# Patient Record
Sex: MALE | Race: WHITE | NOT HISPANIC OR LATINO | Employment: FULL TIME | URBAN - METROPOLITAN AREA
[De-identification: names, ages, dates, MRNs, and addresses within clinical notes are randomized per-mention and may not be internally consistent; named-entity substitution may affect disease eponyms.]

---

## 2017-06-27 ENCOUNTER — TELEPHONE (OUTPATIENT)
Dept: INTERNAL MEDICINE | Facility: CLINIC | Age: 62
End: 2017-06-27

## 2017-09-18 ENCOUNTER — OFFICE VISIT (OUTPATIENT)
Dept: INTERNAL MEDICINE | Facility: CLINIC | Age: 62
End: 2017-09-18

## 2017-09-18 ENCOUNTER — HOSPITAL ENCOUNTER (OUTPATIENT)
Dept: GENERAL RADIOLOGY | Facility: HOSPITAL | Age: 62
Discharge: HOME | End: 2017-09-18
Admitting: INTERNAL MEDICINE

## 2017-09-18 VITALS
TEMPERATURE: 98.4 F | DIASTOLIC BLOOD PRESSURE: 82 MMHG | OXYGEN SATURATION: 98 % | SYSTOLIC BLOOD PRESSURE: 120 MMHG | HEIGHT: 73 IN | WEIGHT: 204.13 LBS | BODY MASS INDEX: 27.05 KG/M2 | HEART RATE: 83 BPM

## 2017-09-18 DIAGNOSIS — R05.9 COUGH: Primary | ICD-10-CM

## 2017-09-18 PROCEDURE — 71020 HC CHEST PA AND LATERAL: CPT

## 2017-09-18 PROCEDURE — 99213 OFFICE O/P EST LOW 20 MIN: CPT | Performed by: INTERNAL MEDICINE

## 2017-09-18 NOTE — PROGRESS NOTES
Subjective   Federico Angeles is a 61 y.o. male.     Pt complains of having on & off productive cough for a couple of months.   Pt has tried multiple meds; z-jessica, tessalon caps & rx cough syrup.     History of Present Illness   Pt started the end of June in the [facility] for a cough and was treated with an abx and went away.  It has now been back for 1 month.  Mostly dry.  No help with allegra or mucinex..  COugh is worse as the day goes on.  Always a tickle in the back of the throat. Does cough some when lying tony but it does not keep him up.  No gerd no orthopnea no PND no LE edema    The following portions of the patient's history were reviewed and updated as appropriate: allergies, current medications, past medical history, past social history and problem list.  No new environment.  Wife is now ill with cough but that is new    Review of Systems   All other systems reviewed and are negative.      Objective     Vitals:    09/18/17 0726   BP: 120/82   Pulse: 83   Temp: 98.4 °F (36.9 °C)   SpO2: 98%       Physical Exam   Constitutional: He is oriented to person, place, and time. He appears well-developed and well-nourished.   HENT:   Head: Normocephalic and atraumatic.   Right Ear: External ear normal.   Left Ear: External ear normal.   Mouth/Throat: Oropharynx is clear and moist.   Eyes: Conjunctivae and EOM are normal. Pupils are equal, round, and reactive to light.   Neck: Normal range of motion. No tracheal deviation present. No thyromegaly present.   Cardiovascular: Normal rate, regular rhythm, normal heart sounds and intact distal pulses.    Pulmonary/Chest: Effort normal and breath sounds normal.   Musculoskeletal: Normal range of motion. He exhibits no edema or deformity.   Neurological: He is alert and oriented to person, place, and time.   Skin: Skin is warm and dry.   Psychiatric: He has a normal mood and affect. His behavior is normal. Judgment and thought content normal.   Vitals reviewed.      Assessment/Plan    Diagnoses and all orders for this visit:    Cough  -     XR Chest PA & Lateral    Other orders  -     mometasone-formoterol (DULERA) 100-5 MCG/ACT inhaler; Inhale 2 puffs 2 (Two) Times a Day.        1. Cough- He needs an xray.  He will try dulera 100/5 and see if that helps  If cough cont we will refer pulm or allergy

## 2017-09-22 ENCOUNTER — TELEPHONE (OUTPATIENT)
Dept: INTERNAL MEDICINE | Facility: CLINIC | Age: 62
End: 2017-09-22

## 2017-09-22 NOTE — TELEPHONE ENCOUNTER
PT AWARE. HE IS GOING TO CALL Monday IF HE IS NOT BETTER    ----- Message from MARLENY Henley sent at 9/22/2017  2:58 PM EDT -----  Let's try flonase for his ear popping. Dr Berry had recommended a referral to allergist or pulm if cough continued. I would refer to allergist.     ----- Message -----     From: Sammie Yanes MA     Sent: 9/22/2017   2:43 PM       To: MARLENY Henley    PT CALLED HE WAS SEEN ON Monday FOR COUGH. DR BERRY GAVE HIM A DULERA INHALER BUT HE FEELS THIS IS NOT HELPING. ALSO HIS LEFT EAR POPS AND MAKES HIM DIZZY. DO YOU HAVE ANY RECOMMENDATIONS FOR HIM?

## 2017-11-21 DIAGNOSIS — Z00.00 HEALTHCARE MAINTENANCE: ICD-10-CM

## 2017-11-28 ENCOUNTER — HOSPITAL ENCOUNTER (EMERGENCY)
Facility: HOSPITAL | Age: 62
Discharge: HOME | End: 2017-11-28
Attending: EMERGENCY MEDICINE | Admitting: EMERGENCY MEDICINE

## 2017-11-28 ENCOUNTER — OFFICE VISIT (OUTPATIENT)
Dept: INTERNAL MEDICINE | Facility: CLINIC | Age: 62
End: 2017-11-28

## 2017-11-28 VITALS
HEART RATE: 65 BPM | DIASTOLIC BLOOD PRESSURE: 93 MMHG | RESPIRATION RATE: 16 BRPM | BODY MASS INDEX: 27.17 KG/M2 | HEIGHT: 73 IN | OXYGEN SATURATION: 99 % | TEMPERATURE: 97.5 F | SYSTOLIC BLOOD PRESSURE: 142 MMHG | WEIGHT: 205 LBS

## 2017-11-28 VITALS
BODY MASS INDEX: 27.83 KG/M2 | DIASTOLIC BLOOD PRESSURE: 90 MMHG | SYSTOLIC BLOOD PRESSURE: 130 MMHG | HEART RATE: 60 BPM | HEIGHT: 73 IN | TEMPERATURE: 97.7 F | WEIGHT: 210 LBS | RESPIRATION RATE: 16 BRPM

## 2017-11-28 DIAGNOSIS — M54.41 CHRONIC BILATERAL LOW BACK PAIN WITH RIGHT-SIDED SCIATICA: Primary | ICD-10-CM

## 2017-11-28 DIAGNOSIS — G89.29 CHRONIC BILATERAL LOW BACK PAIN WITH RIGHT-SIDED SCIATICA: Primary | ICD-10-CM

## 2017-11-28 DIAGNOSIS — M54.50 ACUTE EXACERBATION OF CHRONIC LOW BACK PAIN: Primary | ICD-10-CM

## 2017-11-28 DIAGNOSIS — G89.29 ACUTE EXACERBATION OF CHRONIC LOW BACK PAIN: Primary | ICD-10-CM

## 2017-11-28 LAB
ALBUMIN SERPL-MCNC: 4.3 G/DL (ref 3.5–5.2)
ALBUMIN/GLOB SERPL: 1.5 G/DL
ALP SERPL-CCNC: 76 U/L (ref 39–117)
ALT SERPL-CCNC: 20 U/L (ref 1–41)
AST SERPL-CCNC: 22 U/L (ref 1–40)
BILIRUB SERPL-MCNC: 0.4 MG/DL (ref 0.1–1.2)
BUN SERPL-MCNC: 16 MG/DL (ref 8–23)
BUN/CREAT SERPL: 22.5 (ref 7–25)
CALCIUM SERPL-MCNC: 9.2 MG/DL (ref 8.6–10.5)
CHLORIDE SERPL-SCNC: 102 MMOL/L (ref 98–107)
CHOLEST SERPL-MCNC: 175 MG/DL (ref 0–200)
CO2 SERPL-SCNC: 27.1 MMOL/L (ref 22–29)
CREAT SERPL-MCNC: 0.71 MG/DL (ref 0.76–1.27)
GFR SERPLBLD CREATININE-BSD FMLA CKD-EPI: 112 ML/MIN/1.73
GFR SERPLBLD CREATININE-BSD FMLA CKD-EPI: 136 ML/MIN/1.73
GLOBULIN SER CALC-MCNC: 2.8 GM/DL
GLUCOSE SERPL-MCNC: 95 MG/DL (ref 65–99)
HDLC SERPL-MCNC: 57 MG/DL (ref 40–60)
LDLC SERPL CALC-MCNC: 103 MG/DL (ref 0–100)
LDLC/HDLC SERPL: 1.8 {RATIO}
POTASSIUM SERPL-SCNC: 4.9 MMOL/L (ref 3.5–5.2)
PROT SERPL-MCNC: 7.1 G/DL (ref 6–8.5)
PSA SERPL-MCNC: 1.5 NG/ML (ref 0–4)
SODIUM SERPL-SCNC: 141 MMOL/L (ref 136–145)
TRIGL SERPL-MCNC: 77 MG/DL (ref 0–150)
TSH SERPL DL<=0.005 MIU/L-ACNC: 1.57 MIU/ML (ref 0.27–4.2)
VLDLC SERPL CALC-MCNC: 15.4 MG/DL (ref 5–40)

## 2017-11-28 PROCEDURE — 99283 EMERGENCY DEPT VISIT LOW MDM: CPT

## 2017-11-28 PROCEDURE — 96372 THER/PROPH/DIAG INJ SC/IM: CPT | Performed by: NURSE PRACTITIONER

## 2017-11-28 PROCEDURE — 99213 OFFICE O/P EST LOW 20 MIN: CPT | Performed by: NURSE PRACTITIONER

## 2017-11-28 RX ORDER — UBIDECARENONE 100 MG
100 CAPSULE ORAL DAILY
COMMUNITY
End: 2018-12-10

## 2017-11-28 RX ORDER — OXYCODONE HYDROCHLORIDE AND ACETAMINOPHEN 5; 325 MG/1; MG/1
2 TABLET ORAL ONCE
Status: COMPLETED | OUTPATIENT
Start: 2017-11-28 | End: 2017-11-28

## 2017-11-28 RX ORDER — ORPHENADRINE CITRATE 100 MG/1
100 TABLET, EXTENDED RELEASE ORAL 2 TIMES DAILY
Qty: 20 TABLET | Refills: 0 | Status: SHIPPED | OUTPATIENT
Start: 2017-11-28 | End: 2018-12-10

## 2017-11-28 RX ORDER — IBUPROFEN 200 MG
200 TABLET ORAL EVERY 6 HOURS PRN
COMMUNITY
End: 2019-12-16

## 2017-11-28 RX ORDER — OXYCODONE HYDROCHLORIDE AND ACETAMINOPHEN 5; 325 MG/1; MG/1
1-2 TABLET ORAL EVERY 4 HOURS PRN
Qty: 15 TABLET | Refills: 0 | Status: SHIPPED | OUTPATIENT
Start: 2017-11-28 | End: 2017-12-04 | Stop reason: SDUPTHER

## 2017-11-28 RX ORDER — HYDROCODONE BITARTRATE AND ACETAMINOPHEN 7.5; 325 MG/1; MG/1
1 TABLET ORAL ONCE
Status: DISCONTINUED | OUTPATIENT
Start: 2017-11-28 | End: 2017-11-28

## 2017-11-28 RX ORDER — CETIRIZINE HYDROCHLORIDE 10 MG/1
10 TABLET ORAL DAILY
COMMUNITY

## 2017-11-28 RX ORDER — METHYLPREDNISOLONE ACETATE 80 MG/ML
80 INJECTION, SUSPENSION INTRA-ARTICULAR; INTRALESIONAL; INTRAMUSCULAR; SOFT TISSUE ONCE
Status: COMPLETED | OUTPATIENT
Start: 2017-11-28 | End: 2017-11-28

## 2017-11-28 RX ORDER — DIAZEPAM 5 MG/1
5 TABLET ORAL EVERY 6 HOURS PRN
Status: DISCONTINUED | OUTPATIENT
Start: 2017-11-28 | End: 2017-11-28

## 2017-11-28 RX ORDER — METHYLPREDNISOLONE 4 MG/1
TABLET ORAL
Qty: 21 TABLET | Refills: 0 | Status: SHIPPED | OUTPATIENT
Start: 2017-11-28 | End: 2017-12-04

## 2017-11-28 RX ADMIN — METHYLPREDNISOLONE ACETATE 80 MG: 80 INJECTION, SUSPENSION INTRA-ARTICULAR; INTRALESIONAL; INTRAMUSCULAR; SOFT TISSUE at 10:29

## 2017-11-28 RX ADMIN — OXYCODONE HYDROCHLORIDE AND ACETAMINOPHEN 2 TABLET: 5; 325 TABLET ORAL at 09:00

## 2017-11-28 NOTE — PROGRESS NOTES
Subjective   Federico Angeles is a 61 y.o. male. Patient is here today for   Chief Complaint   Patient presents with   • Back Pain   .    History of Present Illness   C/o back pain x 10 days associated with occasional pain in his right thigh. The pain is now mostly on the left side. He had been cleaning a few days prior to the symptoms starting. He saw a chiropractor on Saturday and was feeling better, and then saw the chiropractor again yesterday and has had increased pain since then.  He has used ice and heat with minimal relief.    He has had problems with his back in the past and had an MRI done in August 2016.  At that time he was going to be going to a spine Center in [place], but his back started feeling better.  Dr. Mendoza at [facility] ordered the MRI.  He has not seen an orthopedic doctor.  He was seen this morning at the emergency room and was given 2 Percocets and then a prescription for Percocet and Norflex.  Due to his increased pain, he is requesting an MRI.  He has also been doing stretches that were recommended by the chiropractor.    The following portions of the patient's history were reviewed and updated as appropriate: allergies, current medications, past family history, past medical history, past social history, past surgical history and problem list.    Review of Systems   Constitutional: Negative.    Respiratory: Negative.    Cardiovascular: Negative.    Musculoskeletal: Positive for back pain.       Objective   Vitals:    11/28/17 0948   BP: 130/90   Pulse: 60   Resp: 16   Temp: 97.7 °F (36.5 °C)     Physical Exam   Constitutional: He is oriented to person, place, and time. Vital signs are normal. He appears well-developed and well-nourished.   Cardiovascular: Normal rate, regular rhythm and normal heart sounds.    Pulmonary/Chest: Effort normal and breath sounds normal.   Musculoskeletal:        Lumbar back: He exhibits decreased range of motion, pain and spasm. He exhibits no bony tenderness  and no swelling.   Neurological: He is alert and oriented to person, place, and time. He has normal strength.   Skin: Skin is warm, dry and intact.   Psychiatric: He has a normal mood and affect. His speech is normal and behavior is normal. Thought content normal.   Nursing note and vitals reviewed.      Assessment/Plan   Federico was seen today for back pain.    Diagnoses and all orders for this visit:    Chronic bilateral low back pain with right-sided sciatica  -     MRI Lumbar Spine Without Contrast  -     MethylPREDNISolone (MEDROL) 4 MG tablet; follow package directions  -     methylPREDNISolone acetate (DEPO-medrol) injection 80 mg; Inject 1 mL into the shoulder, thigh, or buttocks 1 (One) Time.      Will order an MRI since he has had one in the past, but explained to the patient that he may need to try have an x-ray or physical therapy first. States that his chiropractor did an x-ray.

## 2017-11-30 ENCOUNTER — PATIENT MESSAGE (OUTPATIENT)
Dept: INTERNAL MEDICINE | Facility: CLINIC | Age: 62
End: 2017-11-30

## 2017-11-30 DIAGNOSIS — M51.36 BULGING LUMBAR DISC: Primary | ICD-10-CM

## 2017-11-30 DIAGNOSIS — M51.26 LUMBAR HERNIATED DISC: ICD-10-CM

## 2017-12-04 ENCOUNTER — OFFICE VISIT (OUTPATIENT)
Dept: INTERNAL MEDICINE | Facility: CLINIC | Age: 62
End: 2017-12-04

## 2017-12-04 VITALS
OXYGEN SATURATION: 97 % | HEART RATE: 102 BPM | SYSTOLIC BLOOD PRESSURE: 130 MMHG | DIASTOLIC BLOOD PRESSURE: 84 MMHG | HEIGHT: 73 IN | BODY MASS INDEX: 26.9 KG/M2 | WEIGHT: 203 LBS

## 2017-12-04 DIAGNOSIS — J30.9 ALLERGIC RHINITIS, UNSPECIFIED CHRONICITY, UNSPECIFIED SEASONALITY, UNSPECIFIED TRIGGER: ICD-10-CM

## 2017-12-04 DIAGNOSIS — R25.2 CRAMP OF BOTH LOWER EXTREMITIES: ICD-10-CM

## 2017-12-04 DIAGNOSIS — M54.5 LOW BACK PAIN, UNSPECIFIED BACK PAIN LATERALITY, UNSPECIFIED CHRONICITY, WITH SCIATICA PRESENCE UNSPECIFIED: ICD-10-CM

## 2017-12-04 DIAGNOSIS — Z00.00 HEALTH CARE MAINTENANCE: Primary | ICD-10-CM

## 2017-12-04 PROCEDURE — 99213 OFFICE O/P EST LOW 20 MIN: CPT | Performed by: INTERNAL MEDICINE

## 2017-12-04 RX ORDER — OXYCODONE HYDROCHLORIDE AND ACETAMINOPHEN 5; 325 MG/1; MG/1
1-2 TABLET ORAL EVERY 4 HOURS PRN
Qty: 15 TABLET | Refills: 0 | Status: SHIPPED | OUTPATIENT
Start: 2017-12-04 | End: 2018-12-10

## 2017-12-04 NOTE — PROGRESS NOTES
"Subjective   Federico Angeles is a 61 y.o. male and is here for a comprehensive physical exam. The patient reports problems - LBP.    Pt had LBP on the left  He was seen by the NP. He did respond to steroids.and he is doing better  HE did get an MRI which showed some acute on chronic herniated discs.  He does see a chiro on occas.  He does do a lot of stretching on his own and that does help  He does get leg cramps at night on occas    Do you take any herbs or supplements that were not prescribed by a doctor? He does take glucosamine and a MVI and does well with this      Social History: He does stay very active but reg schedule exercise  He does not eat much junk food    Social History     Social History   • Marital status:      Spouse name: PAT ANGELES   • Number of children: 2   • Years of education: N/A     Occupational History   • air  [employer]     Social History Main Topics   • Smoking status: Never Smoker   • Smokeless tobacco: Never Used   • Alcohol use No   • Drug use: No   • Sexual activity: Defer     Other Topics Concern   • Not on file     Social History Narrative    2 grown children    2 grandchildren       Family History:   Family History   Problem Relation Age of Onset   • Heart attack Father 55     drinker   • Liver disease Father    • No Known Problems Sister    • No Known Problems Brother        Past Medical History:   Past Medical History:   Diagnosis Date   • Generalized headaches    • Leg cramps            Review of Systems    A comprehensive review of systems was negative.    Objective   /84 (BP Location: Left arm, Patient Position: Sitting, Cuff Size: Large Adult)  Pulse 102  Ht 73\" (185.4 cm)  Wt 203 lb (92.1 kg)  SpO2 97%  BMI 26.78 kg/m2    General Appearance:    Alert, cooperative, no distress, appears stated age   Head:    Normocephalic, without obvious abnormality, atraumatic   Eyes:    PERRL, conjunctiva/corneas clear, EOM's intact, fundi     " benign, both eyes        Ears:    Normal TM's and external ear canals, both ears   Nose:   Nares normal, septum midline, mucosa normal, no drainage    or sinus tenderness   Throat:   Lips, mucosa, and tongue normal; teeth and gums normal   Neck:   Supple, symmetrical, trachea midline, no adenopathy;        thyroid:  No enlargement/tenderness/nodules; no carotid    bruit or JVD   Back:     Symmetric, no curvature, ROM normal, no CVA tenderness   Lungs:     Clear to auscultation bilaterally, respirations unlabored   Chest wall:    No tenderness or deformity   Heart:    Regular rate and rhythm, S1 and S2 normal, no murmur, rub   or gallop   Abdomen:     Soft, non-tender, bowel sounds active all four quadrants,     no masses, no organomegaly           Extremities:   Extremities normal, atraumatic, no cyanosis or edema   Pulses:   2+ and symmetric all extremities   Skin:   Skin color, texture, turgor normal, no rashes or lesions   Lymph nodes:   Cervical, supraclavicular, and axillary nodes normal   Neurologic:   CNII-XII intact. Normal strength, sensation and reflexes       throughout         Medications:   Current Outpatient Prescriptions:   •  cetirizine (zyrTEC) 10 MG tablet, Take 10 mg by mouth Daily., Disp: , Rfl:   •  coenzyme Q10 100 MG capsule, Take 100 mg by mouth Daily., Disp: , Rfl:   •  Glucosamine-Chondroitin--200-150 MG tablet, Take  by mouth., Disp: , Rfl:   •  ibuprofen (ADVIL,MOTRIN) 200 MG tablet, Take 200 mg by mouth Every 6 (Six) Hours As Needed for Mild Pain  (4 tablets at a time states pt)., Disp: , Rfl:   •  orphenadrine (NORFLEX) 100 MG 12 hr tablet, Take 1 tablet by mouth 2 (Two) Times a Day., Disp: 20 tablet, Rfl: 0  •  oxyCODONE-acetaminophen (PERCOCET) 5-325 MG per tablet, Take 1-2 tablets by mouth Every 4 (Four) Hours As Needed (pain)., Disp: 15 tablet, Rfl: 0       Assessment/Plan   Healthy male exam.      1. Healthcare Maintenance:  2. Patient Counseling:  --Nutrition: Stressed  importance of moderation in sodium/caffeine intake, saturated fat and cholesterol, caloric balance, sufficient intake of fresh fruits, vegetables, fiber, calcium and vit D  --Exercise: I have rec 30 minutes a day  --Substance Abuse: no tob no etoh  --Dental health: he does go to the dentist reg  --Immunizations reviewed.  --Discussed benefits of screening colonoscopy.he has had a colon but we are requesting the report  3. LBP-  He is doing well now  He would like a refill to have on hand in case this happens again  4. AR-  He does have some sx but usually ends after the fall  He will try claritin or allegra

## 2018-02-18 ENCOUNTER — PATIENT MESSAGE (OUTPATIENT)
Dept: INTERNAL MEDICINE | Facility: CLINIC | Age: 63
End: 2018-02-18

## 2018-02-19 RX ORDER — OSELTAMIVIR PHOSPHATE 75 MG/1
75 CAPSULE ORAL DAILY
Qty: 10 CAPSULE | Refills: 0 | Status: SHIPPED | OUTPATIENT
Start: 2018-02-19 | End: 2018-02-19

## 2018-02-19 NOTE — TELEPHONE ENCOUNTER
From: Federico Angeles  To: Ying Villatoro MD  Sent: 2/18/2018 10:23 AM EST  Subject: Prescription Question    Mohini was diagnosed with the flu type A. Can I get a script for Tamaflu. Dae Angeles

## 2018-11-30 DIAGNOSIS — Z00.00 HEALTH CARE MAINTENANCE: ICD-10-CM

## 2018-11-30 DIAGNOSIS — M54.5 LOW BACK PAIN, UNSPECIFIED BACK PAIN LATERALITY, UNSPECIFIED CHRONICITY, WITH SCIATICA PRESENCE UNSPECIFIED: ICD-10-CM

## 2018-12-04 LAB
ALBUMIN SERPL-MCNC: 4.2 G/DL (ref 3.5–5.2)
ALBUMIN/GLOB SERPL: 1.7 G/DL
ALP SERPL-CCNC: 77 U/L (ref 39–117)
ALT SERPL-CCNC: 15 U/L (ref 1–41)
AST SERPL-CCNC: 19 U/L (ref 1–40)
BILIRUB SERPL-MCNC: 0.3 MG/DL (ref 0.1–1.2)
BUN SERPL-MCNC: 14 MG/DL (ref 8–23)
BUN/CREAT SERPL: 17.1 (ref 7–25)
CALCIUM SERPL-MCNC: 9 MG/DL (ref 8.6–10.5)
CHLORIDE SERPL-SCNC: 105 MMOL/L (ref 98–107)
CHOLEST SERPL-MCNC: 160 MG/DL (ref 0–200)
CO2 SERPL-SCNC: 28.3 MMOL/L (ref 22–29)
CREAT SERPL-MCNC: 0.82 MG/DL (ref 0.76–1.27)
GLOBULIN SER CALC-MCNC: 2.5 GM/DL
GLUCOSE SERPL-MCNC: 109 MG/DL (ref 65–99)
HCV AB S/CO SERPL IA: <0.1 S/CO RATIO (ref 0–0.9)
HDLC SERPL-MCNC: 50 MG/DL (ref 40–60)
LDLC SERPL CALC-MCNC: 99 MG/DL (ref 0–100)
LDLC/HDLC SERPL: 1.99 {RATIO}
POTASSIUM SERPL-SCNC: 4.7 MMOL/L (ref 3.5–5.2)
PROT SERPL-MCNC: 6.7 G/DL (ref 6–8.5)
PSA SERPL-MCNC: 1.08 NG/ML (ref 0–4)
SODIUM SERPL-SCNC: 143 MMOL/L (ref 136–145)
TRIGL SERPL-MCNC: 53 MG/DL (ref 0–150)
TSH SERPL DL<=0.005 MIU/L-ACNC: 2.24 MIU/ML (ref 0.27–4.2)
VLDLC SERPL CALC-MCNC: 10.6 MG/DL (ref 5–40)

## 2018-12-10 ENCOUNTER — OFFICE VISIT (OUTPATIENT)
Dept: INTERNAL MEDICINE | Facility: CLINIC | Age: 63
End: 2018-12-10

## 2018-12-10 VITALS
HEIGHT: 73 IN | SYSTOLIC BLOOD PRESSURE: 126 MMHG | TEMPERATURE: 98 F | WEIGHT: 201 LBS | HEART RATE: 73 BPM | OXYGEN SATURATION: 99 % | BODY MASS INDEX: 26.64 KG/M2 | DIASTOLIC BLOOD PRESSURE: 82 MMHG

## 2018-12-10 DIAGNOSIS — Z00.00 HEALTH CARE MAINTENANCE: Primary | ICD-10-CM

## 2018-12-10 PROCEDURE — 99396 PREV VISIT EST AGE 40-64: CPT | Performed by: INTERNAL MEDICINE

## 2018-12-10 NOTE — PROGRESS NOTES
"Subjective   Federico Angeles is a 62 y.o. male and is here for a comprehensive physical exam. The patient reports no problems.      Do you take any herbs or supplements that were not prescribed by a doctor? yrte      Social History: he does stay active and he does eat a healthy diet    Social History     Socioeconomic History   • Marital status:      Spouse name: PAT ANGELES   • Number of children: 2   • Years of education: Not on file   • Highest education level: Not on file   Social Needs   • Financial resource strain: Not on file   • Food insecurity - worry: Not on file   • Food insecurity - inability: Not on file   • Transportation needs - medical: Not on file   • Transportation needs - non-medical: Not on file   Occupational History   • Occupation: air      Employer: [employer]   Tobacco Use   • Smoking status: Never Smoker   • Smokeless tobacco: Never Used   Substance and Sexual Activity   • Alcohol use: No   • Drug use: No   • Sexual activity: Defer   Other Topics Concern   • Not on file   Social History Narrative    2 grown children    2 grandchildren       Family History:   Family History   Problem Relation Age of Onset   • Heart attack Father 55        drinker   • Liver disease Father    • No Known Problems Sister    • No Known Problems Brother        Past Medical History:   Past Medical History:   Diagnosis Date   • Generalized headaches    • Leg cramps            Review of Systems    A comprehensive review of systems was negative.    Objective   /82 (BP Location: Left arm, Patient Position: Sitting)   Pulse 73   Temp 98 °F (36.7 °C) (Oral)   Ht 185.4 cm (73\")   Wt 91.2 kg (201 lb)   SpO2 99%   BMI 26.52 kg/m²     General Appearance:    Alert, cooperative, no distress, appears stated age   Head:    Normocephalic, without obvious abnormality, atraumatic   Eyes:    PERRL, conjunctiva/corneas clear, EOM's intact, fundi     benign, both eyes        Ears:    Normal " TM's and external ear canals, both ears   Nose:   Nares normal, septum midline, mucosa normal, no drainage    or sinus tenderness   Throat:   Lips, mucosa, and tongue normal; teeth and gums normal   Neck:   Supple, symmetrical, trachea midline, no adenopathy;        thyroid:  No enlargement/tenderness/nodules; no carotid    bruit or JVD   Back:     Symmetric, no curvature, ROM normal, no CVA tenderness   Lungs:     Clear to auscultation bilaterally, respirations unlabored   Chest wall:    No tenderness or deformity   Heart:    Regular rate and rhythm, S1 and S2 normal, no murmur, rub   or gallop   Abdomen:     Soft, non-tender, bowel sounds active all four quadrants,     no masses, no organomegaly           Extremities:   Extremities normal, atraumatic, no cyanosis or edema   Pulses:   2+ and symmetric all extremities   Skin:   Skin color, texture, turgor normal, no rashes or lesions   Lymph nodes:   Cervical, supraclavicular, and axillary nodes normal   Neurologic:   CNII-XII intact. Normal strength, sensation and reflexes       throughout       Medications:   Current Outpatient Medications:   •  cetirizine (zyrTEC) 10 MG tablet, Take 10 mg by mouth Daily., Disp: , Rfl:   •  ibuprofen (ADVIL,MOTRIN) 200 MG tablet, Take 200 mg by mouth Every 6 (Six) Hours As Needed for Mild Pain  (4 tablets at a time states pt)., Disp: , Rfl:        Assessment/Plan   Healthy male exam.      1. Healthcare Maintenance:  2. Patient Counseling:  --Nutrition: Stressed importance of moderation in sodium/caffeine intake, saturated fat and cholesterol, caloric balance, sufficient intake of fresh fruits, vegetables, fiber, calcium and vit D  --Exercise: He does exercise reg I have rec some light weights  --Substance Abuse: no tob no etoh  --Dental health: He does go to the dentist reg  --Immunizations reviewed. Declines any vaccines  --Discussed benefits of screening colonoscopy. utd on this

## 2019-01-07 ENCOUNTER — TELEPHONE (OUTPATIENT)
Dept: INTERNAL MEDICINE | Facility: CLINIC | Age: 64
End: 2019-01-07

## 2019-01-07 DIAGNOSIS — M54.50 CHRONIC LEFT-SIDED LOW BACK PAIN WITHOUT SCIATICA: Primary | ICD-10-CM

## 2019-01-07 DIAGNOSIS — G89.29 CHRONIC LEFT-SIDED LOW BACK PAIN WITHOUT SCIATICA: Primary | ICD-10-CM

## 2019-01-07 NOTE — TELEPHONE ENCOUNTER
REFERRAL ORDERED FOR NEUROSURGERY      ----- Message from Ying Villatoro MD sent at 1/7/2019  2:19 PM EST -----  I discussed with the patient  He is having the same pain that precipitated the MRI done one year ago  Refer to NS for left sided LBP    ----- Message -----  From: Sammie Yanes MA  Sent: 1/7/2019  12:58 PM  To: Ying Villatoro MD    PLEASE ADVISE. THE LAST PHYSICAL SAID NOTHING ABOUT BACK PAIN  ----- Message -----  From: Paola Ford  Sent: 1/7/2019  12:38 PM  To: Sammie Yanes MA    Patient said he would like to see a neurosurgeon for chronic back pain and he called their office and they said they would need a referral from us before they could schedule him. I told patient he would need to come in for an appointment and he was not happy and told me to ask Dr Villatoro. He would like to see Dr Zafar Naranjo with [facility]

## 2019-02-25 ENCOUNTER — LAB (OUTPATIENT)
Dept: LAB | Facility: HOSPITAL | Age: 64
End: 2019-02-25

## 2019-02-25 ENCOUNTER — TRANSCRIBE ORDERS (OUTPATIENT)
Dept: ADMINISTRATIVE | Facility: HOSPITAL | Age: 64
End: 2019-02-25

## 2019-02-25 ENCOUNTER — HOSPITAL ENCOUNTER (OUTPATIENT)
Dept: CARDIOLOGY | Facility: HOSPITAL | Age: 64
Discharge: HOME | End: 2019-02-25
Admitting: PODIATRIST

## 2019-02-25 DIAGNOSIS — M20.22 HALLUX RIGIDUS OF LEFT FOOT: ICD-10-CM

## 2019-02-25 DIAGNOSIS — M79.672 LEFT FOOT PAIN: ICD-10-CM

## 2019-02-25 DIAGNOSIS — M20.22 HALLUX RIGIDUS OF LEFT FOOT: Primary | ICD-10-CM

## 2019-02-25 LAB
ALBUMIN SERPL-MCNC: 4.1 G/DL (ref 3.5–5.2)
ALBUMIN/GLOB SERPL: 1.2 G/DL
ALP SERPL-CCNC: 74 U/L (ref 40–129)
ALT SERPL W P-5'-P-CCNC: 15 U/L (ref 5–41)
ANION GAP SERPL CALCULATED.3IONS-SCNC: 7.4 MMOL/L
AST SERPL-CCNC: 18 U/L (ref 5–40)
BASOPHILS # BLD AUTO: 0.05 10*3/MM3 (ref 0–0.2)
BASOPHILS NFR BLD AUTO: 0.7 % (ref 0–1.5)
BILIRUB SERPL-MCNC: 0.3 MG/DL (ref 0.2–1.2)
BUN BLD-MCNC: 12 MG/DL (ref 8–23)
BUN/CREAT SERPL: 14.3 (ref 7–25)
CALCIUM SPEC-SCNC: 9.1 MG/DL (ref 8.8–10.5)
CHLORIDE SERPL-SCNC: 103 MMOL/L (ref 98–107)
CO2 SERPL-SCNC: 29.6 MMOL/L (ref 22–29)
CREAT BLD-MCNC: 0.84 MG/DL (ref 0.76–1.27)
DEPRECATED RDW RBC AUTO: 40.4 FL (ref 37–54)
EOSINOPHIL # BLD AUTO: 0.28 10*3/MM3 (ref 0–0.4)
EOSINOPHIL NFR BLD AUTO: 3.9 % (ref 0.3–6.2)
ERYTHROCYTE [DISTWIDTH] IN BLOOD BY AUTOMATED COUNT: 12.4 % (ref 12.3–15.4)
GFR SERPL CREATININE-BSD FRML MDRD: 92 ML/MIN/1.73
GLOBULIN UR ELPH-MCNC: 3.3 GM/DL
GLUCOSE BLD-MCNC: 89 MG/DL (ref 65–99)
HCT VFR BLD AUTO: 48.2 % (ref 37.5–51)
HGB BLD-MCNC: 15.8 G/DL (ref 13–17.7)
IMM GRANULOCYTES # BLD AUTO: 0.04 10*3/MM3 (ref 0–0.05)
IMM GRANULOCYTES NFR BLD AUTO: 0.6 % (ref 0–0.5)
LYMPHOCYTES # BLD AUTO: 2.5 10*3/MM3 (ref 0.7–3.1)
LYMPHOCYTES NFR BLD AUTO: 34.4 % (ref 19.6–45.3)
MCH RBC QN AUTO: 29.2 PG (ref 26.6–33)
MCHC RBC AUTO-ENTMCNC: 32.8 G/DL (ref 31.5–35.7)
MCV RBC AUTO: 89.1 FL (ref 79–97)
MONOCYTES # BLD AUTO: 0.74 10*3/MM3 (ref 0.1–0.9)
MONOCYTES NFR BLD AUTO: 10.2 % (ref 5–12)
NEUTROPHILS # BLD AUTO: 3.66 10*3/MM3 (ref 1.4–7)
NEUTROPHILS NFR BLD AUTO: 50.2 % (ref 42.7–76)
NRBC BLD AUTO-RTO: 0 /100 WBC (ref 0–0)
PLATELET # BLD AUTO: 250 10*3/MM3 (ref 140–450)
PMV BLD AUTO: 9.3 FL (ref 6–12)
POTASSIUM BLD-SCNC: 4.6 MMOL/L (ref 3.5–5.2)
PROT SERPL-MCNC: 7.4 G/DL (ref 6–8.5)
RBC # BLD AUTO: 5.41 10*6/MM3 (ref 4.14–5.8)
SODIUM BLD-SCNC: 140 MMOL/L (ref 136–145)
WBC NRBC COR # BLD: 7.27 10*3/MM3 (ref 3.4–10.8)

## 2019-02-25 PROCEDURE — 93005 ELECTROCARDIOGRAM TRACING: CPT | Performed by: PODIATRIST

## 2019-02-25 PROCEDURE — 93010 ELECTROCARDIOGRAM REPORT: CPT | Performed by: INTERNAL MEDICINE

## 2019-02-25 PROCEDURE — 36415 COLL VENOUS BLD VENIPUNCTURE: CPT

## 2019-02-25 PROCEDURE — 85025 COMPLETE CBC W/AUTO DIFF WBC: CPT

## 2019-02-25 PROCEDURE — 80053 COMPREHEN METABOLIC PANEL: CPT

## 2019-09-30 ENCOUNTER — OFFICE VISIT (OUTPATIENT)
Dept: INTERNAL MEDICINE | Facility: CLINIC | Age: 64
End: 2019-09-30

## 2019-09-30 ENCOUNTER — HOSPITAL ENCOUNTER (OUTPATIENT)
Dept: GENERAL RADIOLOGY | Facility: HOSPITAL | Age: 64
Discharge: HOME | End: 2019-09-30
Admitting: NURSE PRACTITIONER

## 2019-09-30 VITALS
WEIGHT: 214.2 LBS | DIASTOLIC BLOOD PRESSURE: 70 MMHG | HEART RATE: 86 BPM | OXYGEN SATURATION: 98 % | HEIGHT: 73 IN | BODY MASS INDEX: 28.39 KG/M2 | TEMPERATURE: 98.3 F | SYSTOLIC BLOOD PRESSURE: 120 MMHG

## 2019-09-30 DIAGNOSIS — M25.551 RIGHT HIP PAIN: Primary | ICD-10-CM

## 2019-09-30 PROCEDURE — 90732 PPSV23 VACC 2 YRS+ SUBQ/IM: CPT | Performed by: NURSE PRACTITIONER

## 2019-09-30 PROCEDURE — 90471 IMMUNIZATION ADMIN: CPT | Performed by: NURSE PRACTITIONER

## 2019-09-30 PROCEDURE — 99213 OFFICE O/P EST LOW 20 MIN: CPT | Performed by: NURSE PRACTITIONER

## 2019-09-30 PROCEDURE — 90653 IIV ADJUVANT VACCINE IM: CPT | Performed by: NURSE PRACTITIONER

## 2019-09-30 PROCEDURE — 73502 X-RAY EXAM HIP UNI 2-3 VIEWS: CPT

## 2019-09-30 PROCEDURE — 90472 IMMUNIZATION ADMIN EACH ADD: CPT | Performed by: NURSE PRACTITIONER

## 2019-09-30 RX ORDER — MELOXICAM 15 MG/1
15 TABLET ORAL DAILY
Qty: 30 TABLET | Refills: 0 | Status: SHIPPED | OUTPATIENT
Start: 2019-09-30 | End: 2019-11-25

## 2019-09-30 NOTE — PROGRESS NOTES
Subjective   Federico Angeles is a 63 y.o. male.     History of Present Illness    The patient is here today with c/o hip pain.  Chronic right hip and right low back pain.  Recent eval with neurosurgery eval, MRI, sent him to pain management. Had a facet block with relief of right low back pain. He reports since back pain has improved and he has cut back on his stretching, right hip pain worse. Especially with position changes. He did a follow up with pain management, dx with bursitis, tried an injection with limited relief. No x-rays of hips yet. Has trouble sleeping because of the pain.   Shoulder and knees are hurting also.   Taking ibuprofen with out much relief.   The following portions of the patient's history were reviewed and updated as appropriate: allergies, current medications, past family history, past medical history, past social history, past surgical history and problem list.    Review of Systems   Constitutional: Negative for chills and fever.   Respiratory: Negative.    Cardiovascular: Negative.    Musculoskeletal: Positive for arthralgias.   Psychiatric/Behavioral: Negative for dysphoric mood and suicidal ideas. The patient is not nervous/anxious.        Objective   Physical Exam   Constitutional: He appears well-developed and well-nourished.   Neck: Normal range of motion. Neck supple. No thyromegaly present.   Cardiovascular: Normal rate, regular rhythm, normal heart sounds and intact distal pulses.   Pulmonary/Chest: Effort normal and breath sounds normal.   Musculoskeletal:        Right hip: He exhibits decreased range of motion. He exhibits normal strength, no tenderness, no bony tenderness and no swelling.        Lumbar back: He exhibits normal range of motion and no bony tenderness.   ROM is good, no pain at greater trochanter   Lymphadenopathy:     He has no cervical adenopathy.   Skin: Skin is warm and dry.   Psychiatric: He has a normal mood and affect. His behavior is normal. Judgment and  thought content normal.       Assessment/Plan   There are no diagnoses linked to this encounter.             1. Right hip pain- OA vs bursitis, check x-ray, mobic 15 mg daily with food, suggest PT if this continues vs injection

## 2019-11-04 ENCOUNTER — OFFICE VISIT (OUTPATIENT)
Dept: ORTHOPEDIC SURGERY | Facility: CLINIC | Age: 64
End: 2019-11-04

## 2019-11-04 VITALS — WEIGHT: 205 LBS | HEIGHT: 73 IN | BODY MASS INDEX: 27.17 KG/M2 | TEMPERATURE: 98.4 F

## 2019-11-04 DIAGNOSIS — M54.41 CHRONIC RIGHT-SIDED LOW BACK PAIN WITH RIGHT-SIDED SCIATICA: Primary | ICD-10-CM

## 2019-11-04 DIAGNOSIS — G89.29 CHRONIC RIGHT-SIDED LOW BACK PAIN WITH RIGHT-SIDED SCIATICA: Primary | ICD-10-CM

## 2019-11-04 PROCEDURE — 99203 OFFICE O/P NEW LOW 30 MIN: CPT | Performed by: NURSE PRACTITIONER

## 2019-11-04 RX ORDER — METHYLPREDNISOLONE 4 MG/1
TABLET ORAL
Qty: 21 TABLET | Refills: 0 | Status: SHIPPED | OUTPATIENT
Start: 2019-11-04 | End: 2019-11-25

## 2019-11-04 NOTE — PROGRESS NOTES
Patient: Federico Angeles  YOB: 1955 63 y.o. male  Medical Record Number: [id number]    Chief Complaints:   Chief Complaint   Patient presents with   • Right Hip - Pain   • Establish Care       History of Present Illness:Federico Angeles is a 63 y.o. male who presents as a patient both myself as well as to the practice with complaints of right hip pain.  Patient reports he started with pain in his lower back into his right hip down his right leg several years ago, and is progressively gotten worse.  He initially saw Dr. Naranjo' nurse practitioner sent him for an MRI of his lumbar spine which did show significant arthritis and he had a facet block done in August which did seem to help but just a few days later he started with increased right lateral hip pain going down the leg.  He saw his primary care physician and they gave him a steroid injection into the right hip bursa approximately 6 weeks ago which did help for just a few days.  He was also given meloxicam which has not helped.  He describes that pain in his back and hip as a severe intermittent ache worse with sitting driving walking, better with ice and rest    Allergies: No Known Allergies    Medications:   Current Outpatient Medications   Medication Sig Dispense Refill   • cetirizine (zyrTEC) 10 MG tablet Take 10 mg by mouth Daily.     • ibuprofen (ADVIL,MOTRIN) 200 MG tablet Take 200 mg by mouth Every 6 (Six) Hours As Needed for Mild Pain  (4 tablets at a time states pt).     • MULTIPLE VITAMIN PO Take  by mouth.     • meloxicam (MOBIC) 15 MG tablet Take 1 tablet by mouth Daily. 30 tablet 0   • methylPREDNISolone (MEDROL, [place],) 4 MG tablet Use as directed by package instructions 21 tablet 0     No current facility-administered medications for this visit.          The following portions of the patient's history were reviewed and updated as appropriate: allergies, current medications, past family history, past medical history, past social history,  "past surgical history and problem list.    Review of Systems:   A 14 point review of systems was performed. All systems negative except pertinent positives/negative listed in HPI above    Physical Exam:   Vitals:    11/04/19 0945   Temp: 98.4 °F (36.9 °C)   Weight: 93 kg (205 lb)   Height: 185.4 cm (73\")       General: A and O x 3, ASA, NAD    SCLERA:    Normal    DENTITION:   Normal  Skin clear no unusual lesions noted  Right hip patient is nontender palpation he has normal internal and external rotation with a negative Stinchfield negative logroll calf soft nontender    Radiology:  Xrays previous x-rays of the right hip were reviewed show minimal arthritic changes.    Assessment/Plan:  Low back pain with radiculopathy    I believe that the majority the patient's pain is actually coming from his lower back.  We will try Medrol Dosepak and physical therapy and he will follow-up with Dr. Naranjo to see if any additional epidural injections would be appropriate or other treatment options  "

## 2019-11-25 ENCOUNTER — TELEPHONE (OUTPATIENT)
Dept: INTERNAL MEDICINE | Facility: CLINIC | Age: 64
End: 2019-11-25

## 2019-11-25 ENCOUNTER — OFFICE VISIT (OUTPATIENT)
Dept: INTERNAL MEDICINE | Facility: CLINIC | Age: 64
End: 2019-11-25

## 2019-11-25 VITALS
HEIGHT: 73 IN | DIASTOLIC BLOOD PRESSURE: 90 MMHG | WEIGHT: 214.8 LBS | SYSTOLIC BLOOD PRESSURE: 136 MMHG | BODY MASS INDEX: 28.47 KG/M2 | TEMPERATURE: 99.2 F | OXYGEN SATURATION: 98 % | HEART RATE: 76 BPM

## 2019-11-25 DIAGNOSIS — M54.41 CHRONIC RIGHT-SIDED LOW BACK PAIN WITH RIGHT-SIDED SCIATICA: Primary | ICD-10-CM

## 2019-11-25 DIAGNOSIS — G89.29 CHRONIC RIGHT-SIDED LOW BACK PAIN WITH RIGHT-SIDED SCIATICA: Primary | ICD-10-CM

## 2019-11-25 PROCEDURE — 99213 OFFICE O/P EST LOW 20 MIN: CPT | Performed by: INTERNAL MEDICINE

## 2019-11-25 RX ORDER — METHYLPREDNISOLONE 4 MG/1
TABLET ORAL
Qty: 21 TABLET | Refills: 0 | Status: SHIPPED | OUTPATIENT
Start: 2019-11-25 | End: 2019-12-16

## 2019-11-25 NOTE — PROGRESS NOTES
Subjective   Federico Angeles is a 63 y.o. male here today for lower back and right hip pain.     Back Pain   This is a recurrent problem. The current episode started more than 1 year ago. The problem occurs daily. The problem has been gradually worsening since onset. The pain is present in the sacro-iliac. The quality of the pain is described as stabbing. The pain radiates to the right thigh. The pain is at a severity of 7/10. The pain is worse during the night. The symptoms are aggravated by lying down, sitting and twisting. Stiffness is present in the morning. Pertinent negatives include no abdominal pain, bladder incontinence, bowel incontinence, chest pain, dysuria, fever, headaches, leg pain, numbness, paresis, paresthesias, pelvic pain, perianal numbness, tingling, weakness or weight loss.    He has been having pain siince last January  He saw NS and they referred to pain and had facet injections last spring and completely resolved [ain there but not in his right hip  He say Ignacia for right hip pain  Started on mobic and neg xray  He had no relief with this.  He has seen [facility] and was given steroids which helped a little but returned as soon at stopped  He has now been seeing PT and that made him worse      The following portions of the patient's history were reviewed and updated as appropriate: allergies, current medications, past medical history, past social history and problem list.  No recent trauma    Review of Systems   Constitutional: Negative for fever and weight loss.   Cardiovascular: Negative for chest pain.   Gastrointestinal: Negative for abdominal pain and bowel incontinence.   Genitourinary: Negative for bladder incontinence, dysuria and pelvic pain.   Musculoskeletal: Positive for back pain.   Neurological: Negative for tingling, weakness, numbness, headaches and paresthesias.       Objective   Physical Exam   Constitutional: He is oriented to person, place, and time. He  appears well-developed and well-nourished.   HENT:   Head: Normocephalic and atraumatic.   Right Ear: External ear normal.   Left Ear: External ear normal.   Mouth/Throat: Oropharynx is clear and moist.   Eyes: Conjunctivae and EOM are normal. Pupils are equal, round, and reactive to light.   Neck: Normal range of motion. No tracheal deviation present. No thyromegaly present.   Cardiovascular: Normal rate, regular rhythm, normal heart sounds and intact distal pulses.   Pulmonary/Chest: Effort normal and breath sounds normal.   Abdominal: Soft. Bowel sounds are normal. He exhibits no distension. There is no tenderness.   Musculoskeletal: Normal range of motion. He exhibits no edema or deformity.   Neurological: He is alert and oriented to person, place, and time.   Skin: Skin is warm and dry.   Psychiatric: He has a normal mood and affect. His behavior is normal. Judgment and thought content normal.   Vitals reviewed.      Vitals:    11/25/19 0749   BP: 136/90   Pulse: 76   Temp: 99.2 °F (37.3 °C)   SpO2: 98%     Body mass index is 28.34 kg/m².       Current Outpatient Medications:   •  cetirizine (zyrTEC) 10 MG tablet, Take 10 mg by mouth Daily., Disp: , Rfl:   •  ibuprofen (ADVIL,MOTRIN) 200 MG tablet, Take 200 mg by mouth Every 6 (Six) Hours As Needed for Mild Pain  (4 tablets at a time states pt)., Disp: , Rfl:       Assessment/Plan   Diagnoses and all orders for this visit:    Chronic right-sided low back pain with right-sided sciatica    1.  He has been struggling with this over the last year.  Int better after facet injections.  Now getting much worse.  He cannot sleep due to the pain and has a hard time even working.  We will get an MRI and have him see NS again  He is going to need to take some time off work as he cannot do the heavy lifting  2.  ED-  New onset related to the worsening back sx

## 2019-11-25 NOTE — TELEPHONE ENCOUNTER
PT AWARE    ----- Message from Ying Villatoro MD sent at 11/25/2019  2:31 PM EST -----  Regarding: RE: REQUEST  Any pains meds needed to be prescribed when he was here as he would have needed a contract     He is getting steroids see how that does    ----- Message -----  From: Sammie Yanes MA  Sent: 11/25/2019   1:17 PM  To: Ying Villatoro MD  Subject: RE: REQUEST                                      KRISTAL IGOR PUMAKAVON, ALSO THE MRI IS IN CHART FROM 2017  ----- Message -----  From: Ying Villatoro MD  Sent: 11/25/2019  12:31 PM  To: Sammie Yanes MA  Subject: RE: REQUEST                                      I sent in the steroids    Run a kaspar  ----- Message -----  From: Sammie Yanes MA  Sent: 11/25/2019  12:24 PM  To: Ying Villatoro MD  Subject: FW: REQUEST                                          ----- Message -----  From: Brigida Hazel RegSched Rep  Sent: 11/25/2019  12:17 PM  To: Sammie Yanes MA  Subject: REQUEST                                          When I called patient to give him his MRI details, he asked if Dr. Villatoro could send him in something for pain?

## 2019-12-06 DIAGNOSIS — Z00.00 HEALTH CARE MAINTENANCE: Primary | ICD-10-CM

## 2019-12-06 DIAGNOSIS — Z12.5 SCREENING FOR PROSTATE CANCER: ICD-10-CM

## 2019-12-10 LAB
ALBUMIN SERPL-MCNC: 4.2 G/DL (ref 3.5–5.2)
ALBUMIN/GLOB SERPL: 1.8 G/DL
ALP SERPL-CCNC: 68 U/L (ref 39–117)
ALT SERPL-CCNC: 18 U/L (ref 1–41)
AST SERPL-CCNC: 20 U/L (ref 1–40)
BILIRUB SERPL-MCNC: 0.4 MG/DL (ref 0.2–1.2)
BUN SERPL-MCNC: 14 MG/DL (ref 8–23)
BUN/CREAT SERPL: 14.9 (ref 7–25)
CALCIUM SERPL-MCNC: 9 MG/DL (ref 8.6–10.5)
CHLORIDE SERPL-SCNC: 104 MMOL/L (ref 98–107)
CHOLEST SERPL-MCNC: 181 MG/DL (ref 0–200)
CO2 SERPL-SCNC: 24.8 MMOL/L (ref 22–29)
CREAT SERPL-MCNC: 0.94 MG/DL (ref 0.76–1.27)
GLOBULIN SER CALC-MCNC: 2.3 GM/DL
GLUCOSE SERPL-MCNC: 95 MG/DL (ref 65–99)
HDLC SERPL-MCNC: 52 MG/DL (ref 40–60)
LDLC SERPL CALC-MCNC: 115 MG/DL (ref 0–100)
LDLC/HDLC SERPL: 2.21 {RATIO}
POTASSIUM SERPL-SCNC: 4.4 MMOL/L (ref 3.5–5.2)
PROT SERPL-MCNC: 6.5 G/DL (ref 6–8.5)
PSA SERPL-MCNC: 1.14 NG/ML (ref 0–4)
SODIUM SERPL-SCNC: 142 MMOL/L (ref 136–145)
TRIGL SERPL-MCNC: 70 MG/DL (ref 0–150)
TSH SERPL DL<=0.005 MIU/L-ACNC: 1.35 UIU/ML (ref 0.27–4.2)
VLDLC SERPL CALC-MCNC: 14 MG/DL

## 2019-12-16 ENCOUNTER — OFFICE VISIT (OUTPATIENT)
Dept: INTERNAL MEDICINE | Facility: CLINIC | Age: 64
End: 2019-12-16

## 2019-12-16 VITALS
OXYGEN SATURATION: 99 % | DIASTOLIC BLOOD PRESSURE: 76 MMHG | BODY MASS INDEX: 28.34 KG/M2 | WEIGHT: 213.8 LBS | TEMPERATURE: 98.5 F | HEIGHT: 73 IN | SYSTOLIC BLOOD PRESSURE: 128 MMHG | HEART RATE: 79 BPM

## 2019-12-16 DIAGNOSIS — Z00.00 HEALTH CARE MAINTENANCE: Primary | ICD-10-CM

## 2019-12-16 DIAGNOSIS — M54.50 LOW BACK PAIN, UNSPECIFIED BACK PAIN LATERALITY, UNSPECIFIED CHRONICITY, UNSPECIFIED WHETHER SCIATICA PRESENT: ICD-10-CM

## 2019-12-16 PROCEDURE — 99397 PER PM REEVAL EST PAT 65+ YR: CPT | Performed by: INTERNAL MEDICINE

## 2019-12-16 RX ORDER — MULTIPLE VITAMINS W/ MINERALS TAB 9MG-400MCG
1 TAB ORAL DAILY
COMMUNITY
End: 2022-01-25

## 2019-12-16 RX ORDER — CELECOXIB 200 MG/1
200 CAPSULE ORAL DAILY
Qty: 30 CAPSULE | Refills: 2 | Status: SHIPPED | OUTPATIENT
Start: 2019-12-16 | End: 2020-06-02

## 2019-12-16 RX ORDER — SILDENAFIL 50 MG/1
50 TABLET, FILM COATED ORAL DAILY PRN
Qty: 9 TABLET | Refills: 3 | Status: SHIPPED | OUTPATIENT
Start: 2019-12-16 | End: 2020-07-28

## 2019-12-16 NOTE — PROGRESS NOTES
"Subjective   Federico Angeles is a 63 y.o. male and is here for a comprehensive physical exam. The patient reports problems - right sided back/hip pain.  HE has been cont to pain on and off  He has seen the NS recently and not anything surgical  They have rec pain management.  Pain worse this week  He cannot work right now and NS has been filling out disability paperwork.   He has seen the chiro  Worse after sitting for a while  He did get some relief with prednisone      Do you take any herbs or supplements that were not prescribed by a doctor? MVI      Social History: trying to exercise reg    Social History     Socioeconomic History   • Marital status:      Spouse name: PAT ANGELES   • Number of children: 2   • Years of education: Not on file   • Highest education level: Not on file   Occupational History   • Occupation: air      Employer: [email or web address]   Tobacco Use   • Smoking status: Never Smoker   • Smokeless tobacco: Never Used   Substance and Sexual Activity   • Alcohol use: No   • Drug use: No   • Sexual activity: Defer   Social History Narrative    2 grown children    2 grandchildren       Family History:   Family History   Problem Relation Age of Onset   • Heart attack Father 55        drinker   • Liver disease Father    • No Known Problems Sister    • No Known Problems Brother        Past Medical History:   Past Medical History:   Diagnosis Date   • Generalized headaches    • Leg cramps            Review of Systems    A comprehensive review of systems was negative.    Objective   /76 (BP Location: Left arm, Patient Position: Sitting)   Pulse 79   Temp 98.5 °F (36.9 °C) (Oral)   Ht 185.4 cm (73\")   Wt 97 kg (213 lb 12.8 oz)   SpO2 99%   BMI 28.21 kg/m²     General Appearance:    Alert, cooperative, no distress, appears stated age   Head:    Normocephalic, without obvious abnormality, atraumatic   Eyes:    PERRL, conjunctiva/corneas clear, EOM's intact, fundi     " benign, both eyes        Ears:    Normal TM's and external ear canals, both ears   Nose:   Nares normal, septum midline, mucosa normal, no drainage    or sinus tenderness   Throat:   Lips, mucosa, and tongue normal; teeth and gums normal   Neck:   Supple, symmetrical, trachea midline, no adenopathy;        thyroid:  No enlargement/tenderness/nodules; no carotid    bruit or JVD   Back:     Symmetric, no curvature, ROM normal, no CVA tenderness   Lungs:     Clear to auscultation bilaterally, respirations unlabored   Chest wall:    No tenderness or deformity   Heart:    Regular rate and rhythm, S1 and S2 normal, no murmur, rub   or gallop   Abdomen:     Soft, non-tender, bowel sounds active all four quadrants,     no masses, no organomegaly           Extremities:   Extremities normal, atraumatic, no cyanosis or edema   Pulses:   2+ and symmetric all extremities   Skin:   Skin color, texture, turgor normal, no rashes or lesions   Lymph nodes:   Cervical, supraclavicular, and axillary nodes normal   Neurologic:   CNII-XII intact. Normal strength, sensation and reflexes       throughout       Vitals:    12/16/19 0923   BP: 128/76   Pulse: 79   Temp: 98.5 °F (36.9 °C)   SpO2: 99%     Body mass index is 28.21 kg/m².      Medications:   Current Outpatient Medications:   •  cetirizine (zyrTEC) 10 MG tablet, Take 10 mg by mouth Daily., Disp: , Rfl:   •  Multiple Vitamins-Minerals (MULTIVITAMIN WITH MINERALS) tablet tablet, Take 1 tablet by mouth Daily., Disp: , Rfl:        Assessment/Plan   Healthy male exam.      1. Healthcare Maintenance:  2. Patient Counseling:  --Nutrition: Stressed importance of moderation in sodium/caffeine intake, saturated fat and cholesterol, caloric balance, sufficient intake of fresh fruits, vegetables, fiber, calcium and vit D  --Exercise: he has been trying to use a rowing machine  --Substance Abuse: no tob no etoh  --Dental health: he does go to the dentist reg  --Immunizations  reviewed.[email or web address]  --Discussed benefits of screening colonoscopy.  3.  Back pain-  Seeing pain  Cont working with PT and chiro   4.  HPL- ok   He has been eating more eggs   5.  ED-  We will try viagra

## 2020-01-28 ENCOUNTER — OFFICE VISIT (OUTPATIENT)
Dept: INTERNAL MEDICINE | Facility: CLINIC | Age: 65
End: 2020-01-28

## 2020-01-28 VITALS
DIASTOLIC BLOOD PRESSURE: 70 MMHG | TEMPERATURE: 97.6 F | BODY MASS INDEX: 28.61 KG/M2 | HEART RATE: 71 BPM | SYSTOLIC BLOOD PRESSURE: 120 MMHG | OXYGEN SATURATION: 96 % | HEIGHT: 73 IN | WEIGHT: 215.9 LBS

## 2020-01-28 DIAGNOSIS — M25.551 RIGHT HIP PAIN: ICD-10-CM

## 2020-01-28 DIAGNOSIS — M54.41 RIGHT-SIDED LOW BACK PAIN WITH RIGHT-SIDED SCIATICA, UNSPECIFIED CHRONICITY: Primary | ICD-10-CM

## 2020-01-28 PROCEDURE — 99213 OFFICE O/P EST LOW 20 MIN: CPT | Performed by: INTERNAL MEDICINE

## 2020-01-28 RX ORDER — HYDROCODONE BITARTRATE AND ACETAMINOPHEN 5; 325 MG/1; MG/1
TABLET ORAL AS NEEDED
COMMUNITY
Start: 2019-12-16 | End: 2020-06-02

## 2020-01-28 NOTE — PROGRESS NOTES
Subjective   Federico Angeles is a 64 y.o. male pt is here to discuss about possible extend FMLA for his rt hip and back pain.    History of Present Illness   He did have a epidural earlier this onth and he said the pain going down the leg is better    He is now having right hip pain  He was told this is bursitis but then ortho said this was related to the back but got no better after the epidurals  It causes him pain when first getting up,  Or walking a while  He has tried the celebrex and not cure if it helps      The following portions of the patient's history were reviewed and updated as appropriate: allergies, current medications, past medical history, past social history and problem list.  limite PA due to pain      Review of Systems   All other systems reviewed and are negative.      Objective   Physical Exam   Constitutional: He is oriented to person, place, and time. He appears well-developed and well-nourished.   HENT:   Head: Normocephalic and atraumatic.   Right Ear: External ear normal.   Left Ear: External ear normal.   Mouth/Throat: Oropharynx is clear and moist.   Eyes: Pupils are equal, round, and reactive to light. Conjunctivae and EOM are normal.   Neck: Normal range of motion. No tracheal deviation present. No thyromegaly present.   Cardiovascular: Normal rate, regular rhythm, normal heart sounds and intact distal pulses.   Pulmonary/Chest: Effort normal and breath sounds normal.   Abdominal: Soft. Bowel sounds are normal. He exhibits no distension. There is no tenderness.   Musculoskeletal: Normal range of motion. He exhibits no edema or deformity.   Neurological: He is alert and oriented to person, place, and time.   Skin: Skin is warm and dry.   Psychiatric: He has a normal mood and affect. His behavior is normal. Judgment and thought content normal.   Vitals reviewed.      Vitals:    01/28/20 0825   BP: 120/70   Pulse: 71   Temp: 97.6 °F (36.4 °C)   SpO2: 96%     Current Outpatient Medications:    •  celecoxib (CELEBREX) 200 MG capsule, Take 1 capsule by mouth Daily., Disp: 30 capsule, Rfl: 2  •  cetirizine (zyrTEC) 10 MG tablet, Take 10 mg by mouth Daily., Disp: , Rfl:   •  HYDROcodone-acetaminophen (NORCO) 5-325 MG per tablet, As Needed., Disp: , Rfl:   •  Multiple Vitamins-Minerals (MULTIVITAMIN WITH MINERALS) tablet tablet, Take 1 tablet by mouth Daily., Disp: , Rfl:   •  sildenafil (VIAGRA) 50 MG tablet, Take 1 tablet by mouth Daily As Needed for Erectile Dysfunction., Disp: 9 tablet, Rfl: 3    Body mass index is 28.48 kg/m².         Assessment/Plan   Diagnoses and all orders for this visit:    Right-sided low back pain with right-sided sciatica, unspecified chronicity    Right hip pain        1.  Lumbar radiculopathy-  Better  2.  Right hip pain  Worse with sleeping  He has had an injection with no relief  He is seeing pain on Friday and may need another I jection

## 2020-02-19 ENCOUNTER — TELEPHONE (OUTPATIENT)
Dept: INTERNAL MEDICINE | Facility: CLINIC | Age: 65
End: 2020-02-19

## 2020-02-19 NOTE — TELEPHONE ENCOUNTER
Letter typed and signed. Pt aware      ----- Message from Ying Villatoro MD sent at 2/18/2020  7:33 AM EST -----  Regarding: FW: Non-Urgent Medical Question  Contact: [phone]  ok  ----- Message -----  From: Sammie Yanes MA  Sent: 2/17/2020   7:13 AM EST  To: Ying Villatoro MD  Subject: FW: Non-Urgent Medical Question                  Is this ok?  ----- Message -----  From: Federico Angeles  Sent: 2/16/2020   9:02 PM EST  To: Albertina Marcelo [place] [address] Clinical Pool  Subject: Non-Urgent Medical Question                      I need a release from Dr Villatoro to return to work on Feb 25. I can pick it up or print it from here if possible. It will need to be faxed also to; Deirdre Bynum @ [phone] along with the claim # [id number].  Thanks, Dae Angeles  Let me know if you need anything else.

## 2020-05-29 DIAGNOSIS — M54.50 LOW BACK PAIN, UNSPECIFIED BACK PAIN LATERALITY, UNSPECIFIED CHRONICITY, UNSPECIFIED WHETHER SCIATICA PRESENT: ICD-10-CM

## 2020-05-29 DIAGNOSIS — Z00.00 HEALTH CARE MAINTENANCE: ICD-10-CM

## 2020-06-02 ENCOUNTER — OFFICE VISIT (OUTPATIENT)
Dept: ORTHOPEDIC SURGERY | Facility: CLINIC | Age: 65
End: 2020-06-02

## 2020-06-02 VITALS — TEMPERATURE: 99 F | HEIGHT: 73 IN | WEIGHT: 210 LBS | BODY MASS INDEX: 27.83 KG/M2

## 2020-06-02 DIAGNOSIS — M25.551 RIGHT HIP PAIN: Primary | ICD-10-CM

## 2020-06-02 DIAGNOSIS — M25.561 RIGHT KNEE PAIN, UNSPECIFIED CHRONICITY: ICD-10-CM

## 2020-06-02 PROCEDURE — 99214 OFFICE O/P EST MOD 30 MIN: CPT | Performed by: NURSE PRACTITIONER

## 2020-06-02 PROCEDURE — 73502 X-RAY EXAM HIP UNI 2-3 VIEWS: CPT | Performed by: NURSE PRACTITIONER

## 2020-06-02 PROCEDURE — 73562 X-RAY EXAM OF KNEE 3: CPT | Performed by: NURSE PRACTITIONER

## 2020-06-02 NOTE — PROGRESS NOTES
Patient: Federico Angeles  YOB: 1955 64 y.o. male  Medical Record Number: [id number]    Chief Complaints:   Chief Complaint   Patient presents with   • Right Hip - OPSE, Pain       History of Present Illness:Federico Angeles is a 64 y.o. male who presents with complaints of right lateral hip pain.  Initially he reports he started with pain in his lower back into his right hip down his right leg several years ago, and is progressively gotten worse.  He initially saw Dr. Naranjo' nurse practitioner sent him for an MRI of his lumbar spine which did show significant arthritis and he had a facet block done in August which did seem to help but just a few days later he started with increased right lateral hip pain going down the leg.  He saw his primary care physician and they gave him a steroid injection into the right hip bursa which did help for just a few days.    He then went back to the back specialist and they sent him for an additional block which did help with the pain in his right back and going down his right leg.  His major complaint today still is an area of pain involving the right lateral hip.  He describes it as a severe intermittent stabbing type pain worse with sitting standing driving lying on his side better with rest and ice    Allergies: No Known Allergies    Medications:   Current Outpatient Medications   Medication Sig Dispense Refill   • cetirizine (zyrTEC) 10 MG tablet Take 10 mg by mouth Daily.     • Multiple Vitamins-Minerals (MULTIVITAMIN WITH MINERALS) tablet tablet Take 1 tablet by mouth Daily.     • sildenafil (VIAGRA) 50 MG tablet Take 1 tablet by mouth Daily As Needed for Erectile Dysfunction. 9 tablet 3     No current facility-administered medications for this visit.          The following portions of the patient's history were reviewed and updated as appropriate: allergies, current medications, past family history, past medical history, past social history, past surgical history and  "problem list.    Review of Systems:   A 14 point review of systems was performed. All systems negative except pertinent positives/negative listed in HPI above    Physical Exam:   Vitals:    06/02/20 1605   Temp: 99 °F (37.2 °C)   Weight: 95.3 kg (210 lb)   Height: 185.4 cm (73\")   PainSc:   8       General: A and O x 3, ASA, NAD    SCLERA:    Normal    DENTITION:   Normal  Skin clear no unusual lesions noted  Right hip patient is very tender over right hip greater trochanteric bursa he otherwise has normal internal X rotation with a negative Stinchfield negative logroll calf soft and nontender    Radiology:  Xrays 3views (ap,lateral, sunrise) right knee 2 views right hip ordered and reviewed today secondary to pain show minimal arthritic changes.  Compared to views are unchanged    Assessment/Plan:  Low back pain with radiculopathy improved  Persistent right lateral hip pain    The patient continues to have symptoms we will proceed with an MRI of the right hip to further evaluate and the patient will follow-up with me afterward regarding results and treatment options.  We may try to reinject and send him back to physical therapy but we will wait to see what the MRI of the right hip shows first  Answers for HPI/ROS submitted by the patient on 6/1/2020   Back pain  What is the primary reason for your visit?: Back Pain  Chronicity: chronic  Onset: more than 1 year ago  Frequency: daily  Progression since onset: waxing and waning  Pain location: sacro-iliac  Pain quality: aching  Radiates to: right thigh  Pain - numeric: 8/10  Pain is: worse during the night  Aggravated by: lying down, sitting  Stiffness is present: all day  abdominal pain: No  bladder incontinence: No  bowel incontinence: No  chest pain: No  dysuria: No  fever: No  headaches: No  leg pain: No  numbness: No  paresis: No  paresthesias: No  pelvic pain: No  perianal numbness: No  tingling: No  weakness: No  weight loss: No    "

## 2020-06-03 ENCOUNTER — TELEPHONE (OUTPATIENT)
Dept: ORTHOPEDIC SURGERY | Facility: CLINIC | Age: 65
End: 2020-06-03

## 2020-06-03 NOTE — TELEPHONE ENCOUNTER
----- Message from Federico Angeles sent at 6/3/2020 12:06 PM EDT -----  Regarding: Visit Follow-Up Question  Contact: [phone]  My MRI is set for June 23. Can I make an appointment that week for follow up and MRI review either June 24, 25, or 26th? I'm off that week.   Thanks Dae Angeles

## 2020-06-04 NOTE — TELEPHONE ENCOUNTER
Patient called about his message. I informed him of reply from STACEY about waiting a week for MRI results. Patient says he will just wait for the MRI results before making an appt and see what MLL wants to do then.

## 2020-06-04 NOTE — TELEPHONE ENCOUNTER
Unfortunately the dates of the 24th 25th 26 are probably too soon for the radiologist to get a final reading on his MRI.  Would recommend that I see him a week later

## 2020-06-11 ENCOUNTER — TELEPHONE (OUTPATIENT)
Dept: ORTHOPEDIC SURGERY | Facility: CLINIC | Age: 65
End: 2020-06-11

## 2020-06-11 NOTE — TELEPHONE ENCOUNTER
----- Message from Federico Angeles sent at 6/10/2020 10:26 PM EDT -----  Regarding: Non-Urgent Medical Question  Contact: [phone]  MY CHART MESSAGE:    My lower back is in spasms since Monday. Feels like it did months ago. It's pretty bad. Not sure if I can make the trip home as it's 9 hrs in the car. Can we include the lower back along with the hip MRI? It's scheduled for June 23 but can move it up if there are openings as we aren't going on camping trip now. We should be home this Sunday or Monday if it improves.

## 2020-06-11 NOTE — TELEPHONE ENCOUNTER
Patient had an MRI of his lumbar spine just a year ago with Dr. Villatoro.  It showed significant arthritis.  Would recommend that we focus on the hip to make sure none of the pain is coming from that area.

## 2020-06-15 ENCOUNTER — TELEPHONE (OUTPATIENT)
Dept: ORTHOPEDIC SURGERY | Facility: CLINIC | Age: 65
End: 2020-06-15

## 2020-06-15 RX ORDER — METHYLPREDNISOLONE 4 MG/1
TABLET ORAL
Qty: 21 TABLET | Refills: 0 | Status: SHIPPED | OUTPATIENT
Start: 2020-06-15 | End: 2020-07-13 | Stop reason: SDUPTHER

## 2020-06-15 NOTE — TELEPHONE ENCOUNTER
----- Message from Federico Angeles sent at 6/15/2020  3:39 PM EDT -----  Regarding: RE: Non-Urgent Medical Question  Contact: [phone]  MY CHART MESSAGE:      That's fine as long as meds won't I interfere with MRI next week. [facility] pharmacy [place]. Thanks.    ----- Message -----  From: DUDLEY SALAS  Sent: 6/15/20 2:37 PM  To: Federico Angeles  Subject: RE: Non-Urgent Medical Question    Berna said...  We can try a Medrol Dosepak, if the patient would like to try that.

## 2020-06-15 NOTE — TELEPHONE ENCOUNTER
We can try a Medrol Dosepak, if the patient would like to try that okay to call prescription in for Medrol Dosepak No. 1 as directed

## 2020-06-15 NOTE — TELEPHONE ENCOUNTER
----- Message from Federico Angeles sent at 6/15/2020 10:05 AM EDT -----  Regarding: Non-Urgent Medical Question  Contact: [phone]  MY CHART MESSAGE:    On my way back from [place]. I've had lower right severe back spasms since last Monday. No better today. MRI set for June 22 now. Anything we can do til then. It's pretty bad. Muscle relaxers/ pain meds? Hurts to sit. Eases some when standing. Been laying on ice. That helps. Had to stay 4 days longer hoping to get better. Not

## 2020-06-22 ENCOUNTER — HOSPITAL ENCOUNTER (OUTPATIENT)
Dept: MRI IMAGING | Facility: HOSPITAL | Age: 65
Discharge: HOME | End: 2020-06-22
Admitting: NURSE PRACTITIONER

## 2020-06-22 DIAGNOSIS — M25.551 RIGHT HIP PAIN: ICD-10-CM

## 2020-06-22 PROCEDURE — 73721 MRI JNT OF LWR EXTRE W/O DYE: CPT

## 2020-06-23 ENCOUNTER — APPOINTMENT (OUTPATIENT)
Dept: MRI IMAGING | Facility: HOSPITAL | Age: 65
End: 2020-06-23

## 2020-06-25 ENCOUNTER — TELEPHONE (OUTPATIENT)
Dept: ORTHOPEDIC SURGERY | Facility: CLINIC | Age: 65
End: 2020-06-25

## 2020-06-25 NOTE — TELEPHONE ENCOUNTER
MRI of the hip was normal, definitely pain coming from his back.  Would recommend that he see Dr. Holland for further evaluation and treatment

## 2020-06-25 NOTE — TELEPHONE ENCOUNTER
----- Message from Federico Angeles sent at 6/24/2020  5:16 PM EDT -----  Regarding: Test Results Question  Contact: [phone]  MY CHART MESSAGE:    Any word on MRI results?  Dae

## 2020-07-13 ENCOUNTER — TELEPHONE (OUTPATIENT)
Dept: ORTHOPEDIC SURGERY | Facility: CLINIC | Age: 65
End: 2020-07-13

## 2020-07-13 RX ORDER — METHYLPREDNISOLONE 4 MG/1
TABLET ORAL
Qty: 21 TABLET | Refills: 0 | Status: SHIPPED | OUTPATIENT
Start: 2020-07-13 | End: 2020-07-21

## 2020-07-13 NOTE — TELEPHONE ENCOUNTER
----- Message from Federico Angeles sent at 7/13/2020  9:45 AM EDT -----  Regarding: RE: Non-Urgent Medical Question  Contact: [phone]  By time off do you mean to go ahead and file the disability claim and have Lizbeth Faulkner handle it and then turn it over to Dr Bingham once I see him? I guess I could use the medrol pack if it gets worse. I don't want to over do the steroids as it isn't good to do too much. It's not resolving the issue long term. But it definitely helps. Oralia Pearl    ----- Message -----  From: DUDLEY SALAS  Sent: 7/13/20 9:01 AM  To: Federico Angeles  Subject: RE: Non-Urgent Medical Question    Berna Caballero said... I would be happy to give him a note off until he sees JW-  We can also send rx for medrol dose pack which might help until then    ThanksCony    ----- Message -----     From: Federico Angeles     Sent: 7/13/2020  7:36 AM EDT       To: Berna Salmeron, APRN  Subject: Non-Urgent Medical Question    My back went out again yesterday. This time it's the lower left. Real bad spasms at times. Similar to the last time. My appointment with Dr Shukla isn't untill July 28. Not sure to contact him or you.  I think I'm going to have to go on disability from work for a while untill Dr Shukla can see what's going on. I can't work like this. I can start the claim if you are good with it. Dae Oneil

## 2020-07-13 NOTE — TELEPHONE ENCOUNTER
----- Message from Federico Angeles sent at 7/13/2020  7:36 AM EDT -----  Regarding: Non-Urgent Medical Question  Contact: [phone]  My back went out again yesterday. This time it's the lower left. Real bad spasms at times. Similar to the last time. My appointment with Dr Shukla isn't untill July 28. Not sure to contact him or you.  I think I'm going to have to go on disability from work for a while untill Dr Shukla can see what's going on. I can't work like this. I can start the claim if you are good with it. Thanks, Dae

## 2020-07-13 NOTE — TELEPHONE ENCOUNTER
I would be happy to give him a note off until he sees SHIRLENEW-  We can also send rx for medrol dose pack which might help until then

## 2020-07-13 NOTE — TELEPHONE ENCOUNTER
In response to the previous messages patient wants to know if it's okay to go ahead and start a disability claim. Also would like to have a medrol dose jessica sent to pharmacy.

## 2020-07-13 NOTE — TELEPHONE ENCOUNTER
Patient said he will file for short term disability as being off starting today 7- until seen by Dr. Shukla. He is unable to do his job as an  at [employer] at this time.

## 2020-07-13 NOTE — TELEPHONE ENCOUNTER
New prescription sent to pharmacy for Medrol Dosepak.  Again I would be happy to give him a note off work until that he sees Dr. Holland, not sure if it would qualify for disability or not.

## 2020-07-17 ENCOUNTER — TELEPHONE (OUTPATIENT)
Dept: ORTHOPEDIC SURGERY | Facility: CLINIC | Age: 65
End: 2020-07-17

## 2020-07-17 NOTE — TELEPHONE ENCOUNTER
----- Message from Federico Angeles sent at 7/17/2020  4:47 PM EDT -----  Regarding: Non-Urgent Medical Question  Contact: [phone]  MY CHART MESSAGE:    Wondering if you received the forms from [facility] for the disability claim. Just to let you know, the steroids don't seem to be helping much. My back is pretty bad on the left side. Hard to get up and certainly can't work. Thanks for handling this. Looking forward to seeing  Dr. Shukla.

## 2020-07-21 ENCOUNTER — OFFICE VISIT (OUTPATIENT)
Dept: INTERNAL MEDICINE | Facility: CLINIC | Age: 65
End: 2020-07-21

## 2020-07-21 VITALS
SYSTOLIC BLOOD PRESSURE: 120 MMHG | OXYGEN SATURATION: 98 % | HEART RATE: 75 BPM | HEIGHT: 73 IN | DIASTOLIC BLOOD PRESSURE: 84 MMHG | BODY MASS INDEX: 27.87 KG/M2 | WEIGHT: 210.3 LBS

## 2020-07-21 DIAGNOSIS — R10.9 LEFT FLANK PAIN: Primary | ICD-10-CM

## 2020-07-21 LAB
BILIRUB BLD-MCNC: NEGATIVE MG/DL
CLARITY, POC: CLEAR
COLOR UR: YELLOW
GLUCOSE UR STRIP-MCNC: NEGATIVE MG/DL
KETONES UR QL: NEGATIVE
LEUKOCYTE EST, POC: NEGATIVE
NITRITE UR-MCNC: NEGATIVE MG/ML
PH UR: 5 [PH] (ref 5–8)
PROT UR STRIP-MCNC: NEGATIVE MG/DL
RBC # UR STRIP: NEGATIVE /UL
SP GR UR: 1.02 (ref 1–1.03)
UROBILINOGEN UR QL: NORMAL

## 2020-07-21 PROCEDURE — 99213 OFFICE O/P EST LOW 20 MIN: CPT | Performed by: INTERNAL MEDICINE

## 2020-07-21 RX ORDER — HYDROCODONE BITARTRATE AND ACETAMINOPHEN 5; 325 MG/1; MG/1
1 TABLET ORAL EVERY 6 HOURS PRN
Qty: 20 TABLET | Refills: 0 | Status: SHIPPED | OUTPATIENT
Start: 2020-07-21 | End: 2021-04-02

## 2020-07-21 NOTE — PROGRESS NOTES
Subjective   Federico Angeles is a 64 y.o. male here today for lower left side back pain.      History of Present Illness   He has been having new onset left side back pain  He has had long standing right sided pain for which he saw NS and pain.  He had facet block with no relief.  He had an epidural last January  With some relief in the back but he has cont to have right hip pain but that got better and her returned to work in feb  He did well from feb and June  Until he started having some right pain and tried steroids which helped.  He is scheduled to see cancino for the right.  July 12 he was outside and noticed acute left sided back pain  He has tried steroids without and relief  He is now off work again.  No bladder no bowel incontinence      The following portions of the patient's history were reviewed and updated as appropriate: allergies, current medications, past medical history, past social history and problem list.  No hx of pain on the left side    Review of Systems   Constitutional: Negative for fever.   Cardiovascular: Negative for chest pain.   Gastrointestinal: Negative for abdominal pain.   Genitourinary: Negative for dysuria.   Musculoskeletal: Positive for back pain.   Neurological: Negative for weakness, numbness and headaches.       Objective   Physical Exam   Constitutional: He is oriented to person, place, and time. He appears well-developed and well-nourished.   HENT:   Head: Normocephalic and atraumatic.   Right Ear: External ear normal.   Left Ear: External ear normal.   Mouth/Throat: Oropharynx is clear and moist.   Eyes: Pupils are equal, round, and reactive to light. Conjunctivae and EOM are normal.   Neck: Normal range of motion. No tracheal deviation present. No thyromegaly present.   Cardiovascular: Normal rate, regular rhythm, normal heart sounds and intact distal pulses.   Pulmonary/Chest: Effort normal and breath sounds normal.   Abdominal: Soft. Bowel sounds are normal. He exhibits no  distension. There is no tenderness.   Musculoskeletal: Normal range of motion. He exhibits no edema or deformity.   Neurological: He is alert and oriented to person, place, and time.   Skin: Skin is warm and dry.   Psychiatric: He has a normal mood and affect. His behavior is normal. Judgment and thought content normal.   Vitals reviewed.      Vitals:    07/21/20 1450   BP: 120/84   Pulse: 75   SpO2: 98%     Body mass index is 27.75 kg/m².       Current Outpatient Medications:   •  cetirizine (zyrTEC) 10 MG tablet, Take 10 mg by mouth Daily., Disp: , Rfl:   •  Multiple Vitamins-Minerals (MULTIVITAMIN WITH MINERALS) tablet tablet, Take 1 tablet by mouth Daily., Disp: , Rfl:   •  sildenafil (VIAGRA) 50 MG tablet, Take 1 tablet by mouth Daily As Needed for Erectile Dysfunction., Disp: 9 tablet, Rfl: 3      Assessment/Plan   Diagnoses and all orders for this visit:    Left flank pain      1.  Left sided back pain-  Seeing ortho spine  May need to see pain  I am sending a few hydrocodone as he only takes those rarely     Muscle relaxant dont help  Steroids did not help.

## 2020-07-22 ENCOUNTER — TELEPHONE (OUTPATIENT)
Dept: ORTHOPEDIC SURGERY | Facility: CLINIC | Age: 65
End: 2020-07-22

## 2020-07-22 NOTE — TELEPHONE ENCOUNTER
----- Message from Federico Angeles sent at 7/21/2020  9:13 PM EDT -----  Regarding: Non-Urgent Medical Question  Contact: [phone]  Disability claim.  I saw Dr Irving Méndez to ensure no underlying conditions such as kidney. She did a urine test that was normal. She agrees it's a back issue. Awaiting Dr Shukla's opinion next Tuesday.   We do not have light duty at work, so returning to work with restrictions won't do. I'm unable to work at this point due to the nature of my job.  Thanks for your help.  Dae

## 2020-07-27 ENCOUNTER — TELEPHONE (OUTPATIENT)
Dept: ORTHOPEDIC SURGERY | Facility: CLINIC | Age: 65
End: 2020-07-27

## 2020-07-27 NOTE — TELEPHONE ENCOUNTER
----- Message from Federico Angeles sent at 7/24/2020  9:26 PM EDT -----  Regarding: Non-Urgent Medical Question  Contact: [phone]  I spoke with Tere from the [place]. She needs exam notes stating the concerns we had for the hip pain, MRI results, and referral to Dr Shukla as back issues from my visit with you on June 2 and subsequent phone conversations about MRI results and referral to finalize claim approval. Thnx

## 2020-07-28 ENCOUNTER — CONSULT (OUTPATIENT)
Dept: ORTHOPEDIC SURGERY | Facility: CLINIC | Age: 65
End: 2020-07-28

## 2020-07-28 VITALS — HEIGHT: 73 IN | WEIGHT: 207.8 LBS | TEMPERATURE: 97 F | BODY MASS INDEX: 27.54 KG/M2

## 2020-07-28 DIAGNOSIS — M48.061 SPINAL STENOSIS OF LUMBAR REGION WITHOUT NEUROGENIC CLAUDICATION: ICD-10-CM

## 2020-07-28 DIAGNOSIS — M54.50 LUMBAR SPINE PAIN: Primary | ICD-10-CM

## 2020-07-28 PROCEDURE — 72100 X-RAY EXAM L-S SPINE 2/3 VWS: CPT | Performed by: ORTHOPAEDIC SURGERY

## 2020-07-28 PROCEDURE — 99214 OFFICE O/P EST MOD 30 MIN: CPT | Performed by: ORTHOPAEDIC SURGERY

## 2020-07-28 NOTE — PROGRESS NOTES
New patient or new problem visit    Chief Complaint   Patient presents with   • Lumbar Spine - Establish Care       HPI: He is a 64-year-old who complains of chronic history since February 2019 or thereabout of back pain which at one time radiated down the right lower extremity to the knee but presently is in the left flank.  He is seen several examiners including Dr. Naranjo from [facility], and Lizbeth Salmeron on behalf of Dr. Bunch.  He has had mostly axial pain which is been variously treated with facet blocks which helped somewhat, hip injection for the right bursa which helped somewhat lumbar epidural steroid injections in January 2020 and these helped immensely well as did a Medrol Dosepak but all temporarily.  Chiropractic along the way helped temporarily as well.  He is currently on short-term disability from his job as an  which is physical and aggravates his back pain symptoms.  He was told that the right leg is shorter than the left.    PFSH: See chart- reviewed    Review of Systems   Constitutional: Negative.  Negative for activity change, appetite change, chills, diaphoresis, fatigue, fever and unexpected weight change.   HENT: Negative.  Negative for congestion, hearing loss, nosebleeds, postnasal drip, sore throat, tinnitus and trouble swallowing.    Eyes: Negative.  Negative for pain and visual disturbance.   Respiratory: Negative.  Negative for apnea, cough, chest tightness, shortness of breath and wheezing.    Cardiovascular: Negative.  Negative for chest pain and palpitations.   Gastrointestinal: Negative.  Negative for abdominal pain, blood in stool, diarrhea and nausea.   Endocrine: Negative.    Genitourinary: Negative.  Negative for difficulty urinating and dysuria.   Musculoskeletal: Positive for back pain. Negative for arthralgias and joint swelling.   Skin: Negative.  Negative for color change.   Allergic/Immunologic: Negative.    Neurological: Negative.  Negative for weakness,  light-headedness, numbness and headaches.   Hematological: Negative.    Psychiatric/Behavioral: Negative.  Negative for agitation. The patient is not nervous/anxious.        PE: Constitutional: Vital signs above-noted.  Awake, alert and oriented    Psychiatric: Affect and insight do not appear grossly disturbed.    Pulmonary: Breathing is unlabored, color is good.    Skin: Warm, dry and normal turgor    Cardiac: Pedal pulses intact.  No edema.    Eyesight and hearing appear adequate for examination purposes      Musculoskeletal:  There is some tenderness to percussion and palpation of the left mid lumbar spine. Motion appears slightly stiff.  Posture is unremarkable to coronal and sagittal inspection.    The skin about the area is intact.  There is no palpable or visible deformity.  There is no local spasm.       Neurologic:   Reflexes are 2+ and symmetrical in the patellae and achilles.   Motor function is undisturbed in quadriceps, EHL, and gastrocnemius   sensation appears symmetrically intact to light touch.  In the bilateral lower extremities there is no evidence of atrophy.   Clonus is absent..  Gait appears undisturbed.  SLR test negative      MEDICAL DECISION MAKING    XRAY: Plain film x-rays of the lumbar spine show well-maintained lordosis, fairly unremarkable coronal posture, but multilevel spondylosis and L4-5 and 5 1 foraminal narrowing and may be even an element of congenital canal stenosis.  I reviewed his MRI from [facility] open MRI however which looks fairly decent with regard to patent spinal canal but has a couple of areas of foraminal narrowing in the lower reaches.  I reviewed the radiologist report with which I agree.    Other: n/a    Impression: Multilevel lumbar disc degeneration and foraminal lumbar spinal stenosis with primarily axial pain    Plan: I have recommended continued time off of work and cardiovascular activity.   I will see him back in a month for further evaluation.  No work  meantime.  Answers for HPI/ROS submitted by the patient on 7/21/2020   Back pain  What is the primary reason for your visit?: Back Pain  Chronicity: recurrent  Onset: 1 to 4 weeks ago  Frequency: constantly  Progression since onset: gradually worsening  Pain location: sacro-iliac  Pain quality: aching  Radiates to: does not radiate  Pain - numeric: 8/10  Pain is: worse during the day  Aggravated by: bending, sitting, twisting  Stiffness is present: in the morning, all day  bladder incontinence: No  bowel incontinence: No  leg pain: No  paresis: No  paresthesias: No  pelvic pain: No  perianal numbness: No  tingling: No  weight loss: No  Risk factors: history of steroid use, obesity

## 2020-08-28 ENCOUNTER — OFFICE VISIT (OUTPATIENT)
Dept: ORTHOPEDIC SURGERY | Facility: CLINIC | Age: 65
End: 2020-08-28

## 2020-08-28 ENCOUNTER — TELEPHONE (OUTPATIENT)
Dept: ORTHOPEDIC SURGERY | Facility: CLINIC | Age: 65
End: 2020-08-28

## 2020-08-28 VITALS — HEIGHT: 73 IN | WEIGHT: 203 LBS | BODY MASS INDEX: 26.9 KG/M2 | TEMPERATURE: 97 F

## 2020-08-28 DIAGNOSIS — M54.50 LUMBAR BACK PAIN: Primary | ICD-10-CM

## 2020-08-28 PROCEDURE — 99212 OFFICE O/P EST SF 10 MIN: CPT | Performed by: ORTHOPAEDIC SURGERY

## 2020-08-28 NOTE — PROGRESS NOTES
9 some better after weight loss of 10 pounds but still significant back pain and some radiation-mostly back pain.  Remains with lumbar tenderness on examination.  Good strength in the legs.  I do not think he can do his job at present.  Long-term prognosis is indeterminate at this point.  I am going to keep him off for 3 months and I will see him back at that time.  No x-rays are needed.

## 2020-09-29 ENCOUNTER — FLU SHOT (OUTPATIENT)
Dept: INTERNAL MEDICINE | Facility: CLINIC | Age: 65
End: 2020-09-29

## 2020-09-29 DIAGNOSIS — Z23 NEED FOR INFLUENZA VACCINATION: ICD-10-CM

## 2020-09-29 PROCEDURE — 90471 IMMUNIZATION ADMIN: CPT | Performed by: INTERNAL MEDICINE

## 2020-09-29 PROCEDURE — 90694 VACC AIIV4 NO PRSRV 0.5ML IM: CPT | Performed by: INTERNAL MEDICINE

## 2020-10-06 ENCOUNTER — TELEPHONE (OUTPATIENT)
Dept: ORTHOPEDIC SURGERY | Facility: CLINIC | Age: 65
End: 2020-10-06

## 2020-10-06 DIAGNOSIS — M54.50 LUMBAR BACK PAIN: Primary | ICD-10-CM

## 2020-10-06 RX ORDER — TRAMADOL HYDROCHLORIDE 50 MG/1
TABLET ORAL
Qty: 30 TABLET | Refills: 0 | Status: SHIPPED | OUTPATIENT
Start: 2020-10-06 | End: 2022-02-08 | Stop reason: HOSPADM

## 2020-10-06 NOTE — TELEPHONE ENCOUNTER
[email or web address] message    ----- Message from Federico Angeles sent at 10/5/2020  8:47 PM EDT -----  Regarding: Prescription Question  Contact: [phone]  Could I get something for pain that's a bit more than Ibuprofen for times when my lower back gets bad and goes into spasms? I don't take anything other than Ibuprofen when it gets bad but it's not quite effective. Only take it occasionally as needed. Hard to get sleep when it acts up.  Oralia, Dae

## 2020-10-29 RX ORDER — TRAZODONE HYDROCHLORIDE 50 MG/1
50 TABLET ORAL NIGHTLY
Qty: 30 TABLET | Refills: 2 | Status: SHIPPED | OUTPATIENT
Start: 2020-10-29 | End: 2022-01-25

## 2020-10-29 NOTE — TELEPHONE ENCOUNTER
Please sign the RX    ----- Message from Federico Angeles sent at 10/29/2020  8:55 AM EDT -----  Regarding: RE: Prescription Question  Contact: [phone]  Great! Tell her thanks. Heather in [place]. I will let you know how it goes.Dae    ----- Message -----  From: DUDLEY CORMIER  Sent: 10/29/20 8:30 AM  To: Federico Angeles  Subject: RE: Prescription Question    Dr Villatoro said that we can start with the trazodone 50 mg, start with a half tablet to make sure that you can tolerate it well. I can send it to the local pharmacy and we can see how it does for you. Sammie       ----- Message -----       From:Federico Angeles       Sent:10/28/2020  7:20 PM EDT         To:Ying Villatoro MD    Subject:Prescription Question    I did make an appointment for next Monday but after researching, Ambien is not something I wish to take. Trazadobe seems a better option as it's non addictive and has way less side effects. Would that be something you could prescribe as opposed to coming in for a visit? Oralia Pearl

## 2020-11-20 ENCOUNTER — OFFICE VISIT (OUTPATIENT)
Dept: ORTHOPEDIC SURGERY | Facility: CLINIC | Age: 65
End: 2020-11-20

## 2020-11-20 VITALS — BODY MASS INDEX: 26.51 KG/M2 | WEIGHT: 200 LBS | HEIGHT: 73 IN | TEMPERATURE: 96.9 F

## 2020-11-20 DIAGNOSIS — M54.50 LUMBAR BACK PAIN: Primary | ICD-10-CM

## 2020-11-20 PROCEDURE — 99213 OFFICE O/P EST LOW 20 MIN: CPT | Performed by: ORTHOPAEDIC SURGERY

## 2020-11-20 NOTE — PROGRESS NOTES
No new complaints.  Actually doing better since he has been off work now nearly 3 months.  Did some driving recently which aggravated his back.  No leg pain numbness or tingling.  Good strength in the lower extremities on exam and his gait is unremarkable x-rays today show multilevel degenerative changes at every level.  Fortunately has decent lordosis slight scoliosis.  He might be able to use a brace.  I recommended he try to avoid surgery but he may not be able to go back to work definitely not for now I will see him back in a couple of months.  No x-rays needed.

## 2020-11-23 ENCOUNTER — TELEPHONE (OUTPATIENT)
Dept: ORTHOPEDIC SURGERY | Facility: CLINIC | Age: 65
End: 2020-11-23

## 2020-11-23 NOTE — TELEPHONE ENCOUNTER
----- Message from Federico Agneles sent at 11/21/2020  6:05 PM EST -----  Regarding: Visit Follow-Up Question  Contact: [phone]  Dr Shukla mentioned a back support. We didn't follow up on it when I left. Can I get the info or prescription  for it.  Thanks Dae Angeles

## 2020-11-30 ENCOUNTER — TELEPHONE (OUTPATIENT)
Dept: ORTHOPEDIC SURGERY | Facility: CLINIC | Age: 65
End: 2020-11-30

## 2021-01-08 ENCOUNTER — OFFICE VISIT (OUTPATIENT)
Dept: ORTHOPEDIC SURGERY | Facility: CLINIC | Age: 66
End: 2021-01-08

## 2021-01-08 VITALS — BODY MASS INDEX: 26.5 KG/M2 | WEIGHT: 199.96 LBS | TEMPERATURE: 96.8 F | HEIGHT: 73 IN

## 2021-01-08 DIAGNOSIS — M48.062 SPINAL STENOSIS OF LUMBAR REGION WITH NEUROGENIC CLAUDICATION: Primary | ICD-10-CM

## 2021-01-08 PROCEDURE — 99213 OFFICE O/P EST LOW 20 MIN: CPT | Performed by: ORTHOPAEDIC SURGERY

## 2021-01-08 NOTE — PROGRESS NOTES
He complains of right trochanteric and buttock pain.  Back pain is better now that he has been off work on disability.  Good strength in the legs bilaterally.  Reviewed x-rays multilevel degenerative change but fairly good posture.  His MRI scan by report shows multilevel foraminal stenosis in the lower aspects.  This certainly explains his buttock pain and probably L5 mediated radiculopathy.  We can try a selective nerve root sleeve injection if the pain gets bad enough otherwise he wants to sit tight for now and I think he is doing reasonably well.  He will call for problems but we can order the nerve root sleeve injections based on the current MRI but certainly before considering surgery we need to get a new one.

## 2021-01-11 ENCOUNTER — OFFICE VISIT (OUTPATIENT)
Dept: ORTHOPEDIC SURGERY | Facility: CLINIC | Age: 66
End: 2021-01-11

## 2021-01-11 VITALS — HEIGHT: 73 IN | WEIGHT: 205 LBS | BODY MASS INDEX: 27.17 KG/M2 | TEMPERATURE: 98.3 F

## 2021-01-11 DIAGNOSIS — M25.512 LEFT SHOULDER PAIN, UNSPECIFIED CHRONICITY: Primary | ICD-10-CM

## 2021-01-11 PROCEDURE — 20550 NJX 1 TENDON SHEATH/LIGAMENT: CPT | Performed by: ORTHOPAEDIC SURGERY

## 2021-01-11 PROCEDURE — 99214 OFFICE O/P EST MOD 30 MIN: CPT | Performed by: ORTHOPAEDIC SURGERY

## 2021-01-11 PROCEDURE — 73030 X-RAY EXAM OF SHOULDER: CPT | Performed by: ORTHOPAEDIC SURGERY

## 2021-01-11 PROCEDURE — 20610 DRAIN/INJ JOINT/BURSA W/O US: CPT | Performed by: ORTHOPAEDIC SURGERY

## 2021-01-11 RX ORDER — METHYLPREDNISOLONE ACETATE 80 MG/ML
80 INJECTION, SUSPENSION INTRA-ARTICULAR; INTRALESIONAL; INTRAMUSCULAR; SOFT TISSUE
Status: COMPLETED | OUTPATIENT
Start: 2021-01-11 | End: 2021-01-11

## 2021-01-11 RX ADMIN — METHYLPREDNISOLONE ACETATE 80 MG: 80 INJECTION, SUSPENSION INTRA-ARTICULAR; INTRALESIONAL; INTRAMUSCULAR; SOFT TISSUE at 10:05

## 2021-01-11 RX ADMIN — METHYLPREDNISOLONE ACETATE 80 MG: 80 INJECTION, SUSPENSION INTRA-ARTICULAR; INTRALESIONAL; INTRAMUSCULAR; SOFT TISSUE at 10:04

## 2021-01-11 NOTE — PROGRESS NOTES
Patient: Federico Angeles    YOB: 1955    Medical Record Number: [id number]    Chief Complaints:   Left shoulder pain    History of Present Illness:     65 y.o. male patient who presents with a complaint of left shoulder pain.  He reports that the symptoms first started several months ago but he does not recall exactly.  There was no discrete injury.  His pain is severe, intermittent and stabbing.  He especially notices the pain when getting dressed.  It is really with certain reaching and lifting movements that he feels the pain.  He denies any mechanical symptoms.  No weakness, shooting pain down the arm, numbness, tingling or other associated symptoms.  He denies any alleviating factors.      Allergies: No Known Allergies    Medications:   Home Medications:    Current Outpatient Medications:   •  cetirizine (zyrTEC) 10 MG tablet, Take 10 mg by mouth Daily., Disp: , Rfl:   •  Multiple Vitamins-Minerals (MULTIVITAMIN WITH MINERALS) tablet tablet, Take 1 tablet by mouth Daily., Disp: , Rfl:   •  traMADol (ULTRAM) 50 MG tablet, One or two daily, rarely, for moderate pain, Disp: 30 tablet, Rfl: 0  •  traZODone (DESYREL) 50 MG tablet, Take 1 tablet by mouth Every Night. Start with 1/2 tablet, Disp: 30 tablet, Rfl: 2  •  HYDROcodone-acetaminophen (NORCO) 5-325 MG per tablet, Take 1 tablet by mouth Every 6 (Six) Hours As Needed for Moderate Pain ., Disp: 20 tablet, Rfl: 0    Past Medical History:   Diagnosis Date   • Generalized headaches    • Leg cramps        Past Surgical History:   Procedure Laterality Date   • NECK SURGERY  1979 and 1989       Social History     Occupational History   • Occupation: air      Employer: [employer]   Tobacco Use   • Smoking status: Never Smoker   • Smokeless tobacco: Never Used   Substance and Sexual Activity   • Alcohol use: No   • Drug use: No   • Sexual activity: Defer      Social History     Social History Narrative    2 grown children    2  "grandchildren       Family History   Problem Relation Age of Onset   • Heart attack Father 55        drinker   • Liver disease Father    • No Known Problems Sister    • No Known Problems Brother        Review of Systems:      Constitutional: Denies fever, shaking or chills   Eyes: Denies change in visual acuity   HEENT: Denies nasal congestion or sore throat   Respiratory: Denies cough or shortness of breath   Cardiovascular: Denies chest pain or edema  Endocrine: Denies tremors, palpitations, intolerance of heat or cold, polyuria, polydipsia.  GI: Denies abdominal pain, nausea, vomiting, bloody stools or diarrhea  : Denies frequency, urgency, incontinence, retention, or nocturia.  Musculoskeletal: Denies numbness, tingling or loss of motor function except as above  Integument: Denies rash, lesion or ulceration   Neurologic: Denies headache or focal weakness, deficits  Heme: Denies spontaneous or excessive bleeding, epistaxis, hematuria, melena, fatigue, enlarged or tender lymph nodes.      All other pertinent positives and negatives as noted above in HPI.    Physical Exam: 65 y.o. male  Vitals:    01/11/21 0948   Temp: 98.3 °F (36.8 °C)   Weight: 93 kg (205 lb)   Height: 185.4 cm (73\")     General:  Patient is awake and alert.  Appears in no acute distress or discomfort.    Psych:  Affect and demeanor are appropriate.    Eyes:  Conjunctiva and sclera appear grossly normal.  Eyes track well and EOM seem to be intact.    Ears:  No gross abnormalities.  Hearing adequate for the exam.    Dentition:  No gross abnormalities noted.    Cardiovascular:  Regular rate and rhythm.    Lungs:  Good chest expansion.  Breathing unlabored.    Lymph:  No palpable adenopathy about neck or axilla.    Neck:  Supple.  Normal ROM.  Negative Spurling's for shoulder or arm pain.    Left shoulder is examined.  Skin is benign.  No gross abnormalities on inspection including any atrophy, swellings, or masses.  No palpable masses or " adenopathy.  Focal tenderness noted over biceps groove.  Full shoulder motion.  No evident instability or apprehension.  Positive Neer, Tellez, Speed's, Yergason's.  Weakness with elevation in the scapular plane which I graded as 5- out of 5.  Good strength with internal and external rotation.  Good strength in the deltoid, biceps, triceps, and .  Intact sensation throughout the arm.  Brisk cap refill.  Palpable radial pulse.  Good skin turgor.    Radiology:  AP, scapular Y, and axillary views of the left shoulder are ordered by myself and reviewed to evaluate the patient's complaint.  No comparison films are immediately available.  The x-rays show moderate acromioclavicular arthritis.  There are no obvious acute abnormalities, lesions, masses, significant glenohumeral degenerative changes, or other concerning findings.  The acromiohumeral interval is normal.  Glenoid version appears normal as well.    Assessment/Plan:   1.  Left shoulder pain, possible small or partial-thickness rotator cuff tear 2.  Biceps tendinitis    We discussed the natural history of rotator cuff tears and his options.  We discussed the option of further work-up versus a trial of conservative treatment.  He would prefer to try treating it first.   With regards to conservative options, we discussed appropriate activity modifications, icing as needed, anti-inflammatories, physical therapy, and injections.  The risks, benefits and alternatives to the injection were thoroughly discussed.  The patient consented and the injection was performed as described below.  He is not interested in pursuing physical therapy at this time but he tells me he will call me if he changes his mind..  At this point, I will release patient to follow-up with me if the symptoms persist or recur.    Jackson Moralez MD    01/11/2021    CC to Ying Villatoro MD     Large Joint Arthrocentesis: L subacromial bursa  Date/Time: 1/11/2021 10:04 AM  Consent given by:  patient  Site marked: site marked  Timeout: Immediately prior to procedure a time out was called to verify the correct patient, procedure, equipment, support staff and site/side marked as required   Supporting Documentation  Indications: pain   Procedure Details  Location: shoulder - L subacromial bursa  Preparation: Patient was prepped and draped in the usual sterile fashion  Needle gauge: 21g.  Approach: posterior  Medications administered: 2 mL lidocaine (cardiac); 80 mg methylPREDNISolone acetate 80 MG/ML  Patient tolerance: patient tolerated the procedure well with no immediate complications    Large Joint Arthrocentesis  Date/Time: 1/11/2021 10:05 AM  Consent given by: patient  Site marked: site marked  Timeout: Immediately prior to procedure a time out was called to verify the correct patient, procedure, equipment, support staff and site/side marked as required   Supporting Documentation  Indications: pain   Procedure Details  Location: shoulder - Shoulder joint: Bicep tendon scheath   Preparation: Patient was prepped and draped in the usual sterile fashion  Needle gauge: 21g.  Approach: posterior  Medications administered: 2 mL lidocaine (cardiac); 80 mg methylPREDNISolone acetate 80 MG/ML  Patient tolerance: patient tolerated the procedure well with no immediate complications

## 2021-01-12 ENCOUNTER — TELEPHONE (OUTPATIENT)
Dept: ORTHOPEDIC SURGERY | Facility: CLINIC | Age: 66
End: 2021-01-12

## 2021-01-12 NOTE — TELEPHONE ENCOUNTER
I spoke to Mr. Angeles.  He tells me he is having some discomfort in his shoulder after the injection.  He has been using ice and over-the-counter anti-inflammatories.  He just wanted to make sure that he was doing the right things.  I explained the injections can cause some discomfort usually lasting only 2 to 3 days.  I encouraged him to continue the conservative treatments that he is currently using.  He acknowledged understanding and appreciated the return call.

## 2021-01-12 NOTE — TELEPHONE ENCOUNTER
Please give him a call and let him know the soreness is to be expected.  We can call in some tramadol for him, if he would like.  Have him ice and take over-the-counter anti-inflammatories as well.  Thanks.

## 2021-01-12 NOTE — TELEPHONE ENCOUNTER
----- Message from Federico Angeles sent at 1/12/2021  8:40 AM EST -----  Regarding: Visit Follow-Up Question  Contact: [phone]  My left shoulder is pretty sore from yesterday's injection. Just wanted to see if this is normal. I have ice on it. I know you said to give it a few weeks. Probably ok and will let up soon.  Thanks,  Dae

## 2021-03-22 ENCOUNTER — BULK ORDERING (OUTPATIENT)
Dept: CASE MANAGEMENT | Facility: OTHER | Age: 66
End: 2021-03-22

## 2021-03-22 DIAGNOSIS — Z23 IMMUNIZATION DUE: ICD-10-CM

## 2021-04-02 ENCOUNTER — OFFICE VISIT (OUTPATIENT)
Dept: ORTHOPEDIC SURGERY | Facility: CLINIC | Age: 66
End: 2021-04-02

## 2021-04-02 VITALS — BODY MASS INDEX: 27.17 KG/M2 | HEIGHT: 73 IN | TEMPERATURE: 96.5 F | WEIGHT: 205.03 LBS

## 2021-04-02 DIAGNOSIS — M48.062 SPINAL STENOSIS OF LUMBAR REGION WITH NEUROGENIC CLAUDICATION: Primary | ICD-10-CM

## 2021-04-02 PROCEDURE — 99213 OFFICE O/P EST LOW 20 MIN: CPT | Performed by: ORTHOPAEDIC SURGERY

## 2021-04-02 RX ORDER — TRAMADOL HYDROCHLORIDE 50 MG/1
50 TABLET ORAL EVERY 4 HOURS PRN
Qty: 30 TABLET | Refills: 0 | Status: SHIPPED | OUTPATIENT
Start: 2021-04-02 | End: 2021-05-17

## 2021-04-02 NOTE — PROGRESS NOTES
He had a flareup of the right iliac and lumbar area pain back in February but it is improved since then.  The hip pain is now better no balance difficulties bowel or bladder complaints.  Good strength in the legs on examination.  I think he had some L5 radiculopathy which is now better.  At some point epidural injection could help but I would hold off for now and I am going to refill his tramadol, which she is using sparingly and about which I apprised him of the risk we will see him as needed

## 2021-05-05 ENCOUNTER — PATIENT MESSAGE (OUTPATIENT)
Dept: ORTHOPEDIC SURGERY | Facility: CLINIC | Age: 66
End: 2021-05-05

## 2021-05-05 DIAGNOSIS — M48.062 SPINAL STENOSIS OF LUMBAR REGION WITH NEUROGENIC CLAUDICATION: Primary | ICD-10-CM

## 2021-05-05 RX ORDER — METHYLPREDNISOLONE 4 MG/1
TABLET ORAL
Qty: 21 TABLET | Refills: 0 | Status: SHIPPED | OUTPATIENT
Start: 2021-05-05 | End: 2021-05-17

## 2021-05-13 ENCOUNTER — TELEPHONE (OUTPATIENT)
Dept: INTERNAL MEDICINE | Facility: CLINIC | Age: 66
End: 2021-05-13

## 2021-05-13 DIAGNOSIS — Z00.00 HEALTH CARE MAINTENANCE: Primary | ICD-10-CM

## 2021-05-13 DIAGNOSIS — Z12.5 SCREENING FOR PROSTATE CANCER: ICD-10-CM

## 2021-05-13 RX ORDER — SILDENAFIL 50 MG/1
50 TABLET, FILM COATED ORAL DAILY PRN
Qty: 9 TABLET | Refills: 5 | Status: SHIPPED | OUTPATIENT
Start: 2021-05-13 | End: 2022-01-25

## 2021-05-13 NOTE — TELEPHONE ENCOUNTER
F/U SCHEDULED FOR 5/2021    ----- Message from Federico Angeles sent at 5/13/2021  9:03 AM EDT -----  Regarding: Prescription Question  Contact: [phone]  Can I get a refill on the Sildenafil. The bottle say 3 refills by 12/20. Obviously don't use it often. Thnx

## 2021-05-13 NOTE — TELEPHONE ENCOUNTER
Labs ordered      ----- Message from Federico Angeles sent at 5/13/2021  9:55 AM EDT -----  Regarding: RE: Non-Urgent Medical Question  Contact: [phone]    To..  ----- Message -----  From: DUDLEY CORMIER  Sent: 5/13/21 9:30 AM  To: Federico Angeles  Subject: RE: Non-Urgent Medical Question    You can come in fasting today or tomorrow to have those done if you would like to discuss them with Dr Villatoro at the appt. Sammie      ----- Message -----       From:Federico Angeles       Sent:5/13/2021  9:21 AM EDT         To:Ying Villatoro MD    Subject:Non-Urgent Medical Question    I scheduled my physical for next Monday. It should have been last December.  Do I need to schedule the blood work 1st as in the past?

## 2021-05-17 ENCOUNTER — OFFICE VISIT (OUTPATIENT)
Dept: INTERNAL MEDICINE | Facility: CLINIC | Age: 66
End: 2021-05-17

## 2021-05-17 VITALS
HEIGHT: 73 IN | OXYGEN SATURATION: 99 % | SYSTOLIC BLOOD PRESSURE: 124 MMHG | HEART RATE: 65 BPM | BODY MASS INDEX: 28.43 KG/M2 | DIASTOLIC BLOOD PRESSURE: 80 MMHG | TEMPERATURE: 97.8 F | WEIGHT: 214.5 LBS

## 2021-05-17 DIAGNOSIS — F51.01 PRIMARY INSOMNIA: ICD-10-CM

## 2021-05-17 DIAGNOSIS — G89.29 OTHER CHRONIC PAIN: ICD-10-CM

## 2021-05-17 DIAGNOSIS — Z00.00 HEALTH CARE MAINTENANCE: Primary | ICD-10-CM

## 2021-05-17 PROCEDURE — 99397 PER PM REEVAL EST PAT 65+ YR: CPT | Performed by: INTERNAL MEDICINE

## 2021-05-17 NOTE — PROGRESS NOTES
"Subjective   Federico Angeles is a 65 y.o. male and is here for a comprehensive physical exam. The patient reports problems - chronic pain.    He does still have chronic pain in the back occas steroids helps  No relief with injection  Minimal relief with pain meds  He has cont to struggle with sleep  The trazodone does not help with sleep.  And he does not worry about this as much now that he is retired    Do you take any herbs or supplements that were not prescribed by a doctor? MVI      Social History: no tob no etoh  Social History     Socioeconomic History   • Marital status:      Spouse name: PAT ANGELES   • Number of children: 2   • Years of education: Not on file   • Highest education level: Not on file   Tobacco Use   • Smoking status: Never Smoker   • Smokeless tobacco: Never Used   Substance and Sexual Activity   • Alcohol use: No   • Drug use: No   • Sexual activity: Defer       Family History:   Family History   Problem Relation Age of Onset   • Heart attack Father 55        drinker   • Liver disease Father    • No Known Problems Sister    • No Known Problems Brother        Past Medical History:   Past Medical History:   Diagnosis Date   • Generalized headaches    • Leg cramps            Review of Systems    A comprehensive review of systems was negative.    Objective   /80 (BP Location: Left arm, Patient Position: Sitting)   Pulse 65   Temp 97.8 °F (36.6 °C) (Infrared)   Ht 185.4 cm (72.99\")   Wt 97.3 kg (214 lb 8 oz)   SpO2 99%   BMI 28.31 kg/m²     General Appearance:    Alert, cooperative, no distress, appears stated age   Head:    Normocephalic, without obvious abnormality, atraumatic   Eyes:    PERRL, conjunctiva/corneas clear, EOM's intact, fundi     benign, both eyes        Ears:    Normal TM's and external ear canals, both ears   Nose:   Nares normal, septum midline, mucosa normal, no drainage    or sinus tenderness   Throat:   Lips, mucosa, and tongue normal; teeth and gums " normal   Neck:   Supple, symmetrical, trachea midline, no adenopathy;        thyroid:  No enlargement/tenderness/nodules; no carotid    bruit or JVD   Back:     Symmetric, no curvature, ROM normal, no CVA tenderness   Lungs:     Clear to auscultation bilaterally, respirations unlabored   Chest wall:    No tenderness or deformity   Heart:    Regular rate and rhythm, S1 and S2 normal, no murmur, rub   or gallop   Abdomen:     Soft, non-tender, bowel sounds active all four quadrants,     no masses, no organomegaly           Extremities:   Extremities normal, atraumatic, no cyanosis or edema   Pulses:   2+ and symmetric all extremities   Skin:   Skin color, texture, turgor normal, no rashes or lesions   Lymph nodes:   Cervical, supraclavicular, and axillary nodes normal   Neurologic:   CNII-XII intact. Normal strength, sensation and reflexes       throughout       Vitals:    05/17/21 0828   BP: 124/80   Pulse: 65   Temp: 97.8 °F (36.6 °C)   SpO2: 99%     Body mass index is 28.31 kg/m².      Medications:   Current Outpatient Medications:   •  cetirizine (zyrTEC) 10 MG tablet, Take 10 mg by mouth Daily., Disp: , Rfl:   •  Multiple Vitamins-Minerals (MULTIVITAMIN WITH MINERALS) tablet tablet, Take 1 tablet by mouth Daily., Disp: , Rfl:   •  sildenafil (VIAGRA) 50 MG tablet, Take 1 tablet by mouth Daily As Needed for Erectile Dysfunction., Disp: 9 tablet, Rfl: 5  •  traMADol (ULTRAM) 50 MG tablet, One or two daily, rarely, for moderate pain, Disp: 30 tablet, Rfl: 0  •  traZODone (DESYREL) 50 MG tablet, Take 1 tablet by mouth Every Night. Start with 1/2 tablet, Disp: 30 tablet, Rfl: 2       Assessment/Plan   Healthy male exam.      1. Healthcare Maintenance:  2. Patient Counseling:  --Nutrition: Stressed importance of moderation in sodium/caffeine intake, saturated fat and cholesterol, caloric balance, sufficient intake of fresh fruits, vegetables, fiber, calcium and vit D  --Exercise: he does stay active  --Substance Abuse:  no tob   --Dental health: he does go to the dentist reg  --Immunizations reviewed.  --Discussed benefits of screening colonoscopy.utd on colon  Due 2023  No sx and no FH  3.  Choric pain-  Ok with current regimen  He is going to get a better exercise routine  4. Insomnia-  Try magnesium.  Trazodone   5.  Tinnitus- hx of life long exposure to loud noise  May need to see ENT  Will hold off for now

## 2021-07-02 ENCOUNTER — OFFICE VISIT (OUTPATIENT)
Dept: ORTHOPEDIC SURGERY | Facility: CLINIC | Age: 66
End: 2021-07-02

## 2021-07-02 VITALS — HEIGHT: 73 IN | TEMPERATURE: 96.5 F | WEIGHT: 214.51 LBS | BODY MASS INDEX: 28.43 KG/M2

## 2021-07-02 DIAGNOSIS — R52 PAIN: Primary | ICD-10-CM

## 2021-07-02 DIAGNOSIS — S33.5XXD LUMBAR SPRAIN, SUBSEQUENT ENCOUNTER: ICD-10-CM

## 2021-07-02 PROCEDURE — 99213 OFFICE O/P EST LOW 20 MIN: CPT | Performed by: ORTHOPAEDIC SURGERY

## 2021-07-02 PROCEDURE — 72100 X-RAY EXAM L-S SPINE 2/3 VWS: CPT | Performed by: ORTHOPAEDIC SURGERY

## 2021-07-02 NOTE — PROGRESS NOTES
He continues with lumbar back pain.  We have tried physical therapy occasional tramadol.  He attempts rowing for exercise and that seems to be helping somewhat.  Pains in the right iliac and lumbar area and fluctuates but is worse with activity and lifting.  I do not think he can go back to work as an .  I reviewed his x-rays and he has multilevel degenerative changes and surgery for this would be a wide spread fusion and were not anywhere near that point.  No change from prior films.  I plan on seeing him back in 3 or 4 months.  No x-rays are needed.

## 2021-08-12 ENCOUNTER — TELEPHONE (OUTPATIENT)
Dept: ORTHOPEDIC SURGERY | Facility: CLINIC | Age: 66
End: 2021-08-12

## 2021-08-12 RX ORDER — METHYLPREDNISOLONE 4 MG/1
TABLET ORAL
Qty: 21 TABLET | Refills: 0 | Status: SHIPPED | OUTPATIENT
Start: 2021-08-12 | End: 2022-01-25

## 2021-09-28 ENCOUNTER — OFFICE VISIT (OUTPATIENT)
Dept: ORTHOPEDIC SURGERY | Facility: CLINIC | Age: 66
End: 2021-09-28

## 2021-09-28 VITALS — TEMPERATURE: 97.3 F | HEIGHT: 73 IN | BODY MASS INDEX: 27.83 KG/M2 | WEIGHT: 210 LBS

## 2021-09-28 DIAGNOSIS — M54.50 LUMBAR BACK PAIN: Primary | ICD-10-CM

## 2021-09-28 PROCEDURE — 99213 OFFICE O/P EST LOW 20 MIN: CPT | Performed by: ORTHOPAEDIC SURGERY

## 2021-09-28 NOTE — PROGRESS NOTES
No different.  Multilevel back pain worse on some days than other.  Cannot do his job as an  and it makes sense since he has widespread degenerative changes over the entire lumbar spine.  Similarly for that reason I do not think he is a good surgical candidate at this point but may be a candidate for surgery in the future.  So long as he avoids a heavy lifting twisting bending his pain is manageable and does not warrant extensive spinal fusion.  I do not think he can do heavy lifting twisting bending prolonged standing or sitting without arrest, certainly would be expected to miss more than the usual work absences due to back pain and he cannot drive for an occupation.  Pretty much at 65 this disqualifies him from job candidacy in my opinion, certainly in his chosen field.  He states the tramadol is no longer controlling the pain so he may consider going to pain clinic or discuss this issue with Dr. Villatoro.  Finally I will see him back in 3 months but these restrictions are permanent.

## 2021-11-03 DIAGNOSIS — M54.50 LUMBAR BACK PAIN: Primary | ICD-10-CM

## 2021-11-17 ENCOUNTER — FLU SHOT (OUTPATIENT)
Dept: INTERNAL MEDICINE | Facility: CLINIC | Age: 66
End: 2021-11-17

## 2021-11-17 PROCEDURE — 90471 IMMUNIZATION ADMIN: CPT | Performed by: NURSE PRACTITIONER

## 2021-11-17 PROCEDURE — 90662 IIV NO PRSV INCREASED AG IM: CPT | Performed by: NURSE PRACTITIONER

## 2021-11-29 ENCOUNTER — OFFICE VISIT (OUTPATIENT)
Dept: ORTHOPEDIC SURGERY | Facility: CLINIC | Age: 66
End: 2021-11-29

## 2021-11-29 VITALS — TEMPERATURE: 98 F | HEIGHT: 73 IN | BODY MASS INDEX: 27.83 KG/M2 | WEIGHT: 210 LBS

## 2021-11-29 DIAGNOSIS — M25.511 RIGHT SHOULDER PAIN, UNSPECIFIED CHRONICITY: Primary | ICD-10-CM

## 2021-11-29 PROCEDURE — 73030 X-RAY EXAM OF SHOULDER: CPT | Performed by: ORTHOPAEDIC SURGERY

## 2021-11-29 PROCEDURE — 99214 OFFICE O/P EST MOD 30 MIN: CPT | Performed by: ORTHOPAEDIC SURGERY

## 2021-11-29 RX ORDER — DIAZEPAM 5 MG/1
5 TABLET ORAL
Qty: 1 TABLET | Refills: 0 | Status: SHIPPED | OUTPATIENT
Start: 2021-11-29 | End: 2022-01-25

## 2021-11-29 NOTE — PROGRESS NOTES
Patient:Federico Angeles    YOB: 1955    Medical Record Number:[id number]    Chief Complaints:  Bilateral shoulder pain    History of Present Illness:     65 y.o. male patient who presents for evaluation of both shoulders.  I have previously seen him for the left.  I performed an injection for him which worked very well.  He is having a little bit of left shoulder discomfort at this point but his primary complaint is the right.  His pain is described as severe, 10 out of 10 and stabbing.  The pain is worse whenever he tries to reach or lift overhead.  He also has some night pain and difficulty sleeping.  He is right-hand dominant and this has been a significant hardship for him.    Allergies:No Known Allergies    Home Medications:    Current Outpatient Medications:   •  cetirizine (zyrTEC) 10 MG tablet, Take 10 mg by mouth Daily., Disp: , Rfl:   •  Multiple Vitamins-Minerals (MULTIVITAMIN WITH MINERALS) tablet tablet, Take 1 tablet by mouth Daily., Disp: , Rfl:   •  traMADol (ULTRAM) 50 MG tablet, One or two daily, rarely, for moderate pain, Disp: 30 tablet, Rfl: 0  •  methylPREDNISolone (MEDROL) 4 MG dose pack, Use as directed by package instructions, Disp: 21 tablet, Rfl: 0  •  sildenafil (VIAGRA) 50 MG tablet, Take 1 tablet by mouth Daily As Needed for Erectile Dysfunction., Disp: 9 tablet, Rfl: 5  •  traZODone (DESYREL) 50 MG tablet, Take 1 tablet by mouth Every Night. Start with 1/2 tablet, Disp: 30 tablet, Rfl: 2    Past Medical History:   Diagnosis Date   • Generalized headaches    • Leg cramps        Past Surgical History:   Procedure Laterality Date   • NECK SURGERY  1979 and 1989       Social History     Occupational History   • Occupation: air      Employer: [employer]   Tobacco Use   • Smoking status: Never Smoker   • Smokeless tobacco: Never Used   Vaping Use   • Vaping Use: Never used   Substance and Sexual Activity   • Alcohol use: No   • Drug use: No   • Sexual  "activity: Defer      Social History     Social History Narrative    2 grown children    2 grandchildren       Family History   Problem Relation Age of Onset   • Heart attack Father 55        drinker   • Liver disease Father    • No Known Problems Sister    • No Known Problems Brother        Review of Systems:      Constitutional: Denies fever, shaking or chills   Eyes: Denies change in visual acuity   HEENT: Denies nasal congestion or sore throat   Respiratory: Denies cough or shortness of breath   Cardiovascular: Denies chest pain or edema  Endocrine: Denies tremors, palpitations, intolerance of heat or cold, polyuria, polydipsia.  GI: Denies abdominal pain, nausea, vomiting, bloody stools or diarrhea  : Denies frequency, urgency, incontinence, retention, or nocturia.  Musculoskeletal: Denies numbness, tingling or loss of motor function except as above  Integument: Denies rash, lesion or ulceration   Neurologic: Denies headache or focal weakness, deficits  Heme: Denies spontaneous or excessive bleeding, epistaxis, hematuria, melena, fatigue, enlarged or tender lymph nodes.      All other pertinent positives and negatives as noted above in HPI.    Physical Exam:65 y.o. male  Vitals:    11/29/21 1302   Temp: 98 °F (36.7 °C)   Weight: 95.3 kg (210 lb)   Height: 185.4 cm (72.99\")       General:  Patient is awake and alert.  Appears in no acute distress or discomfort.    Psych:  Affect and demeanor are appropriate.    Extremities:  Right upper extremity:  Skin is benign.  No gross abnormalities on inspection including any atrophy, swellings, or masses.  No palpable masses or adenopathy.  No focal areas of significant tenderness.  Full shoulder motion.  No evident instability or apprehension.  Mildly positive Neer, Tellez maneuvers.  5- out of 5 strength with elevation in the scapular plane.   Good strength with internal and external rotation.  Good strength in the deltoid, biceps, triceps, and .  Intact sensation " throughout the arm.  Brisk cap refill.  Palpable radial pulse.  Good skin turgor.    Imaging:  AP, scapular Y, and axillary views of the right shoulder are ordered by myself and reviewed to evaluate the patient's complaint.  No comparison films are immediately available.  The x-rays show a type 2 acromion which is slightly larger than his spur on the left.  He has moderate AC arthritis.  There are no obvious acute abnormalities, lesions, masses, significant glenohumeral degenerative changes, or other concerning findings.  The acromiohumeral interval is normal.  Glenoid version appears normal as well.    Assessment/Plan: Right shoulder pain, possible rotator cuff tear    We discussed his options.  He would like to move forward with further work-up with an MRI with an eye towards possible surgery if he has a tear.  I told him I will call him with the results.  He does mention that he has a history of claustrophobia and requested sedation for the MRI.  I agreed to give him a prescription for Valium to take 1 hour prior to procedure.  He understands that he will need someone to drive him to and from the MRI.  Risk of the medicine were discussed.    Jackson Moralez MD    11/29/2021

## 2021-12-09 ENCOUNTER — TELEPHONE (OUTPATIENT)
Dept: ORTHOPEDIC SURGERY | Facility: CLINIC | Age: 66
End: 2021-12-09

## 2021-12-09 NOTE — TELEPHONE ENCOUNTER
Caller: Federico Angeles    Relationship to patient: Self    Best call back number: [phone]    Patient is needing: PATIENT CALLING TO TALK TO ANNAMARIA RE: SCHEDULING SURGERY

## 2021-12-10 ENCOUNTER — TELEPHONE (OUTPATIENT)
Dept: ORTHOPEDIC SURGERY | Facility: CLINIC | Age: 66
End: 2021-12-10

## 2021-12-10 NOTE — TELEPHONE ENCOUNTER
Caller:  SAL HALLMAN    Relationship to patient: SELF    Best call back number:    Patient is needing: PATIENT CALLED TO CHECK STATUS OF HAVING MRI SCHEDULED @ [facility] - PER PATIENT HE HAD SPOKE WITH ANNAMARIA ABOUT THIS.  ADDED NOTE TO REFERRAL FOR THE MRI - PATIENT IS REQUESTING TO HAVE @ [facility] - AS PER [facility] THEY HAVE OR HAD AVAILABILITY FOR 12 13 2021.  PATIENT REQ CALL BACK ABOUT THIS.

## 2021-12-16 ENCOUNTER — TELEPHONE (OUTPATIENT)
Dept: ORTHOPEDIC SURGERY | Facility: CLINIC | Age: 66
End: 2021-12-16

## 2021-12-16 NOTE — TELEPHONE ENCOUNTER
Caller: SAMANTHA  Relationship to Patient: SELF    Phone Number: [phone]  Reason for Call: PT CALLED STATING THAT HE HAS BEEN TRYING TO GET AN MRI SCHEDULED AT [facility] FOR 2 WEEKS WITH NO REPLY OR UPDATE ABOUT ANYTHING. HE STATES THAT [facility] HAD AN APPT FOR HIM 12/13/2021 THAT HAS NOW CAME AND WENT DUE TO NOT HAVING ORDER SENT OVER TO THEM. PT IS UPSET ABOUT THIS PROCESS AND WOULD LIKE SOMEBODY TO REACH OUT TO HIM ASAP. PLEASE ADVISE PT AT ABOVE PHONE NUMBER

## 2021-12-21 ENCOUNTER — OFFICE VISIT (OUTPATIENT)
Dept: ORTHOPEDIC SURGERY | Facility: CLINIC | Age: 66
End: 2021-12-21

## 2021-12-21 VITALS — BODY MASS INDEX: 27.83 KG/M2 | TEMPERATURE: 97.2 F | HEIGHT: 73 IN | WEIGHT: 210 LBS

## 2021-12-21 DIAGNOSIS — M54.50 LUMBAR BACK PAIN: Primary | ICD-10-CM

## 2021-12-21 PROCEDURE — 99213 OFFICE O/P EST LOW 20 MIN: CPT | Performed by: ORTHOPAEDIC SURGERY

## 2021-12-21 RX ORDER — DIPHENOXYLATE HYDROCHLORIDE AND ATROPINE SULFATE 2.5; .025 MG/1; MG/1
TABLET ORAL DAILY
COMMUNITY
End: 2022-03-02

## 2021-12-21 NOTE — PROGRESS NOTES
Really about the same intermittent back pain, occasional radiation around the flank but not into the legs.  He cannot return to his job as an .  He is going to be seen by pain management.  I do not think there is much else I can do but I will see him back as needed and in 3 months

## 2022-01-03 ENCOUNTER — PREP FOR SURGERY (OUTPATIENT)
Dept: OTHER | Facility: HOSPITAL | Age: 67
End: 2022-01-03

## 2022-01-03 ENCOUNTER — TELEPHONE (OUTPATIENT)
Dept: ORTHOPEDIC SURGERY | Facility: CLINIC | Age: 67
End: 2022-01-03

## 2022-01-03 DIAGNOSIS — M25.511 RIGHT SHOULDER PAIN, UNSPECIFIED CHRONICITY: Primary | ICD-10-CM

## 2022-01-03 RX ORDER — CEFAZOLIN SODIUM 2 G/100ML
2 INJECTION, SOLUTION INTRAVENOUS ONCE
Status: CANCELLED | OUTPATIENT
Start: 2022-02-08 | End: 2022-01-03

## 2022-01-03 NOTE — TELEPHONE ENCOUNTER
I spoke to Mr. Angeles.  I provided him with the right shoulder MR arthrogram as reviewed by Dr. Moralez.  He has rotator cuff tendinosis and a labral tear.  He has AC joint arthritis as well.  I recommended he follow-up with Dr. Moralez to discuss options.  He agreed.  I will have a staff member assist him with scheduling this appointment.

## 2022-01-04 PROBLEM — M25.511 RIGHT SHOULDER PAIN: Status: ACTIVE | Noted: 2022-01-04

## 2022-01-05 ENCOUNTER — APPOINTMENT (OUTPATIENT)
Dept: MRI IMAGING | Facility: HOSPITAL | Age: 67
End: 2022-01-05

## 2022-01-05 ENCOUNTER — APPOINTMENT (OUTPATIENT)
Dept: GENERAL RADIOLOGY | Facility: HOSPITAL | Age: 67
End: 2022-01-05

## 2022-01-25 ENCOUNTER — PRE-ADMISSION TESTING (OUTPATIENT)
Dept: PREADMISSION TESTING | Facility: HOSPITAL | Age: 67
End: 2022-01-25

## 2022-01-25 VITALS
DIASTOLIC BLOOD PRESSURE: 97 MMHG | OXYGEN SATURATION: 97 % | HEIGHT: 73 IN | HEART RATE: 88 BPM | BODY MASS INDEX: 30.11 KG/M2 | TEMPERATURE: 97.9 F | WEIGHT: 227.2 LBS | SYSTOLIC BLOOD PRESSURE: 149 MMHG | RESPIRATION RATE: 16 BRPM

## 2022-01-25 LAB
ANION GAP SERPL CALCULATED.3IONS-SCNC: 9.2 MMOL/L (ref 5–15)
BUN SERPL-MCNC: 13 MG/DL (ref 8–23)
BUN/CREAT SERPL: 16.7 (ref 7–25)
CALCIUM SPEC-SCNC: 9.1 MG/DL (ref 8.6–10.5)
CHLORIDE SERPL-SCNC: 103 MMOL/L (ref 98–107)
CO2 SERPL-SCNC: 27.8 MMOL/L (ref 22–29)
CREAT SERPL-MCNC: 0.78 MG/DL (ref 0.76–1.27)
DEPRECATED RDW RBC AUTO: 39.3 FL (ref 37–54)
ERYTHROCYTE [DISTWIDTH] IN BLOOD BY AUTOMATED COUNT: 12.2 % (ref 12.3–15.4)
GFR SERPL CREATININE-BSD FRML MDRD: 99 ML/MIN/1.73
GLUCOSE SERPL-MCNC: 114 MG/DL (ref 65–99)
HCT VFR BLD AUTO: 46.7 % (ref 37.5–51)
HGB BLD-MCNC: 16.1 G/DL (ref 13–17.7)
MCH RBC QN AUTO: 30.3 PG (ref 26.6–33)
MCHC RBC AUTO-ENTMCNC: 34.5 G/DL (ref 31.5–35.7)
MCV RBC AUTO: 87.8 FL (ref 79–97)
PLATELET # BLD AUTO: 270 10*3/MM3 (ref 140–450)
PMV BLD AUTO: 9.9 FL (ref 6–12)
POTASSIUM SERPL-SCNC: 4.1 MMOL/L (ref 3.5–5.2)
QT INTERVAL: 364 MS
RBC # BLD AUTO: 5.32 10*6/MM3 (ref 4.14–5.8)
SODIUM SERPL-SCNC: 140 MMOL/L (ref 136–145)
WBC NRBC COR # BLD: 7.12 10*3/MM3 (ref 3.4–10.8)

## 2022-01-25 PROCEDURE — 93005 ELECTROCARDIOGRAM TRACING: CPT

## 2022-01-25 PROCEDURE — 80048 BASIC METABOLIC PNL TOTAL CA: CPT

## 2022-01-25 PROCEDURE — 85027 COMPLETE CBC AUTOMATED: CPT

## 2022-01-25 PROCEDURE — 36415 COLL VENOUS BLD VENIPUNCTURE: CPT

## 2022-01-25 PROCEDURE — 93010 ELECTROCARDIOGRAM REPORT: CPT | Performed by: INTERNAL MEDICINE

## 2022-01-25 RX ORDER — MELATONIN
1000 DAILY
COMMUNITY
End: 2023-03-07

## 2022-01-25 RX ORDER — SILDENAFIL 50 MG/1
50 TABLET, FILM COATED ORAL DAILY PRN
COMMUNITY
End: 2022-09-28 | Stop reason: SDUPTHER

## 2022-01-25 NOTE — DISCHARGE INSTRUCTIONS
Take the following medications the morning of surgery:  None     Arrival time :Hospital will call with arrival time prior to day of surgery.       General Instructions:  • Do not eat solid food after midnight the night before surgery.  • You may drink clear liquids day of surgery but must stop at least one hour before your hospital arrival time.  • It is beneficial for you to have a clear drink that contains carbohydrates the day of surgery.  We suggest a 12 to 20 ounce bottle of Gatorade or Powerade for non-diabetic patients or a 12 to 20 ounce bottle of G2 or Powerade Zero for diabetic patients. (Pediatric patients, are not advised to drink a 12 to 20 ounce carbohydrate drink)    Clear liquids are liquids you can see through.  Nothing red in color.     Plain water                               Sports drinks  Sodas                                   Gelatin (Jell-O)  Fruit juices without pulp such as white grape juice and apple juice  Popsicles that contain no fruit or yogurt  Tea or coffee (no cream or milk added)  Gatorade / Powerade  G2 / Powerade Zero    • Patients who avoid smoking, chewing tobacco and alcohol for 4 weeks prior to surgery have a reduced risk of post-operative complications.  Quit smoking as many days before surgery as you can.  • Do not smoke, use chewing tobacco or drink alcohol the day of surgery.   • If applicable bring your C-PAP/ BI-PAP machine.  • Bring any papers given to you in the doctor’s office.  • Wear clean comfortable clothes.  • Do not wear contact lenses, false eyelashes or make-up.  Bring a case for your glasses.   • Remove all piercings.  Leave jewelry and any other valuables at home.  • Hair extensions with metal clips must be removed prior to surgery.  • The Pre-Admission Testing nurse will instruct you to bring medications if unable to obtain an accurate list in Pre-Admission Testing.        Preventing a Surgical Site Infection:  • For 2 to 3 days before surgery, avoid  shaving with a razor because the razor can irritate skin and make it easier to develop an infection.    • Any areas of open skin can increase the risk of a post-operative wound infection by allowing bacteria to enter and travel throughout the body.  Notify your surgeon if you have any skin wounds / rashes even if it is not near the expected surgical site.  The area will need assessed to determine if surgery should be delayed until it is healed.  • The night prior to surgery shower using a fresh bar of anti-bacterial soap (such as Dial) and clean washcloth.  Sleep in a clean bed with clean clothing.  Do not allow pets to sleep with you.  • Shower on the morning of surgery using a fresh bar of anti-bacterial soap (such as Dial) and clean washcloth.  Dry with a clean towel and dress in clean clothing.  • Ask your surgeon if you will be receiving antibiotics prior to surgery.  • Make sure you, your family, and all healthcare providers clean their hands with soap and water or an alcohol based hand  before caring for you or your wound.    Day of surgery:  Your arrival time is approximately two hours before your scheduled surgery time.  Upon arrival, a Pre-op nurse and Anesthesiologist will review your health history, obtain vital signs, and answer questions you may have.  The only belongings needed at this time will be a list of your home medications and if applicable your C-PAP/BI-PAP machine.  A Pre-op nurse will start an IV and you may receive medication in preparation for surgery, including something to help you relax.     Please be aware that surgery does come with discomfort.  We want to make every effort to control your discomfort so please discuss any uncontrolled symptoms with your nurse.   Your doctor will most likely have prescribed pain medications.      If you are going home after surgery you will receive individualized written care instructions before being discharged.  A responsible adult must  drive you to and from the hospital on the day of your surgery and stay with you for 24 hours.  Discharge prescriptions can be filled by the hospital pharmacy during regular pharmacy hours.  If you are having surgery late in the day/evening your prescription may be e-prescribed to your pharmacy.  Please verify your pharmacy hours or chose a 24 hour pharmacy to avoid not having access to your prescription because your pharmacy has closed for the day.    If you are staying overnight following surgery, you will be transported to your hospital room following the recovery period.  [facility] has all private rooms.    If you have any questions please call Pre-Admission Testing at [phone].  Deductibles and co-payments are collected on the day of service. Please be prepared to pay the required co-pay, deductible or deposit on the day of service as defined by your plan.    Patient Education for Self-Quarantine Process    • Following your COVID testing, we strongly recommend that you wear a mask when you are with other people and practice social distancing.   • Limit your activities to only required outings.  • Wash your hands with soap and water frequently for at least 20 seconds.   • Avoid touching your eyes, nose and mouth with unwashed hands.  • Do not share anything - utensils, drinking glasses, food from the same bowl.   • Sanitize household surfaces daily. Include all high touch areas (door handles, light switches, phones, countertops, etc.)    Call your surgeon immediately if you experience any of the following symptoms:  • Sore Throat  • Shortness of Breath or difficulty breathing  • Cough  • Chills  • Body soreness or muscle pain  • Headache  • Fever  • New loss of taste or smell  • Do not arrive for your surgery ill.  Your procedure will need to be rescheduled to another time.  You will need to call your physician before the day of surgery to avoid any unnecessary exposure to hospital staff as  well as other patients.

## 2022-02-05 ENCOUNTER — LAB (OUTPATIENT)
Dept: LAB | Facility: HOSPITAL | Age: 67
End: 2022-02-05

## 2022-02-05 DIAGNOSIS — M25.511 RIGHT SHOULDER PAIN, UNSPECIFIED CHRONICITY: ICD-10-CM

## 2022-02-05 LAB — SARS-COV-2 ORF1AB RESP QL NAA+PROBE: NOT DETECTED

## 2022-02-05 PROCEDURE — C9803 HOPD COVID-19 SPEC COLLECT: HCPCS

## 2022-02-05 PROCEDURE — U0004 COV-19 TEST NON-CDC HGH THRU: HCPCS

## 2022-02-08 ENCOUNTER — HOSPITAL ENCOUNTER (OUTPATIENT)
Facility: HOSPITAL | Age: 67
Setting detail: HOSPITAL OUTPATIENT SURGERY
Discharge: HOME | End: 2022-02-08
Attending: ORTHOPAEDIC SURGERY | Admitting: ORTHOPAEDIC SURGERY

## 2022-02-08 ENCOUNTER — ANESTHESIA (OUTPATIENT)
Dept: PERIOP | Facility: HOSPITAL | Age: 67
End: 2022-02-08

## 2022-02-08 ENCOUNTER — ANESTHESIA EVENT (OUTPATIENT)
Dept: PERIOP | Facility: HOSPITAL | Age: 67
End: 2022-02-08

## 2022-02-08 VITALS
OXYGEN SATURATION: 95 % | HEART RATE: 70 BPM | SYSTOLIC BLOOD PRESSURE: 149 MMHG | RESPIRATION RATE: 16 BRPM | TEMPERATURE: 97.5 F | DIASTOLIC BLOOD PRESSURE: 93 MMHG

## 2022-02-08 DIAGNOSIS — M25.511 RIGHT SHOULDER PAIN, UNSPECIFIED CHRONICITY: ICD-10-CM

## 2022-02-08 PROCEDURE — 25010000002 ONDANSETRON PER 1 MG: Performed by: NURSE ANESTHETIST, CERTIFIED REGISTERED

## 2022-02-08 PROCEDURE — 29824 SHO ARTHRS SRG DSTL CLAVICLC: CPT | Performed by: ORTHOPAEDIC SURGERY

## 2022-02-08 PROCEDURE — C1713 ANCHOR/SCREW BN/BN,TIS/BN: HCPCS | Performed by: ORTHOPAEDIC SURGERY

## 2022-02-08 PROCEDURE — 25010000002 EPINEPHRINE PER 0.1 MG: Performed by: ORTHOPAEDIC SURGERY

## 2022-02-08 PROCEDURE — 25010000002 DROPERIDOL PER 5 MG: Performed by: NURSE ANESTHETIST, CERTIFIED REGISTERED

## 2022-02-08 PROCEDURE — C9290 INJ, BUPIVACAINE LIPOSOME: HCPCS | Performed by: ANESTHESIOLOGY

## 2022-02-08 PROCEDURE — 25010000002 FENTANYL CITRATE (PF) 50 MCG/ML SOLUTION: Performed by: NURSE ANESTHETIST, CERTIFIED REGISTERED

## 2022-02-08 PROCEDURE — 25010000002 DEXAMETHASONE PER 1 MG: Performed by: NURSE ANESTHETIST, CERTIFIED REGISTERED

## 2022-02-08 PROCEDURE — 0 BUPIVACAINE LIPOSOME 1.3 % SUSPENSION: Performed by: ANESTHESIOLOGY

## 2022-02-08 PROCEDURE — 25010000002 HYDROMORPHONE PER 4 MG: Performed by: NURSE ANESTHETIST, CERTIFIED REGISTERED

## 2022-02-08 PROCEDURE — 0 CEFAZOLIN IN DEXTROSE 2-4 GM/100ML-% SOLUTION: Performed by: ORTHOPAEDIC SURGERY

## 2022-02-08 PROCEDURE — 29828 SHO ARTHRS SRG BICP TENODSIS: CPT | Performed by: ORTHOPAEDIC SURGERY

## 2022-02-08 PROCEDURE — 25010000002 PROPOFOL 10 MG/ML EMULSION: Performed by: NURSE ANESTHETIST, CERTIFIED REGISTERED

## 2022-02-08 PROCEDURE — 29827 SHO ARTHRS SRG RT8TR CUF RPR: CPT | Performed by: ORTHOPAEDIC SURGERY

## 2022-02-08 PROCEDURE — 76942 ECHO GUIDE FOR BIOPSY: CPT | Performed by: ORTHOPAEDIC SURGERY

## 2022-02-08 PROCEDURE — 25010000002 MIDAZOLAM PER 1 MG: Performed by: ANESTHESIOLOGY

## 2022-02-08 PROCEDURE — 25010000002 FENTANYL CITRATE (PF) 50 MCG/ML SOLUTION: Performed by: ANESTHESIOLOGY

## 2022-02-08 PROCEDURE — 29826 SHO ARTHRS SRG DECOMPRESSION: CPT | Performed by: ORTHOPAEDIC SURGERY

## 2022-02-08 DEVICE — SUT/ANCH BIOCOMP SWIVELOCK/C 4.75X19.1MM: Type: IMPLANTABLE DEVICE | Site: SHOULDER | Status: FUNCTIONAL

## 2022-02-08 DEVICE — SUT/ANCH FIBERTAK RC W/3 1.3MM SUT TP: Type: IMPLANTABLE DEVICE | Site: SHOULDER | Status: FUNCTIONAL

## 2022-02-08 RX ORDER — PROPOFOL 10 MG/ML
VIAL (ML) INTRAVENOUS AS NEEDED
Status: DISCONTINUED | OUTPATIENT
Start: 2022-02-08 | End: 2022-02-08 | Stop reason: SURG

## 2022-02-08 RX ORDER — PROMETHAZINE HYDROCHLORIDE 25 MG/1
25 TABLET ORAL ONCE AS NEEDED
Status: DISCONTINUED | OUTPATIENT
Start: 2022-02-08 | End: 2022-02-08 | Stop reason: HOSPADM

## 2022-02-08 RX ORDER — FAMOTIDINE 10 MG/ML
20 INJECTION, SOLUTION INTRAVENOUS ONCE
Status: COMPLETED | OUTPATIENT
Start: 2022-02-08 | End: 2022-02-08

## 2022-02-08 RX ORDER — ONDANSETRON 2 MG/ML
INJECTION INTRAMUSCULAR; INTRAVENOUS AS NEEDED
Status: DISCONTINUED | OUTPATIENT
Start: 2022-02-08 | End: 2022-02-08 | Stop reason: SURG

## 2022-02-08 RX ORDER — GABAPENTIN 300 MG/1
300 CAPSULE ORAL 2 TIMES DAILY PRN
Qty: 60 CAPSULE | Refills: 0 | Status: SHIPPED | OUTPATIENT
Start: 2022-02-08 | End: 2022-03-02

## 2022-02-08 RX ORDER — FENTANYL CITRATE 50 UG/ML
INJECTION, SOLUTION INTRAMUSCULAR; INTRAVENOUS AS NEEDED
Status: DISCONTINUED | OUTPATIENT
Start: 2022-02-08 | End: 2022-02-08 | Stop reason: SURG

## 2022-02-08 RX ORDER — ACETAMINOPHEN 650 MG
TABLET, EXTENDED RELEASE ORAL AS NEEDED
Status: DISCONTINUED | OUTPATIENT
Start: 2022-02-08 | End: 2022-02-08 | Stop reason: HOSPADM

## 2022-02-08 RX ORDER — SODIUM CHLORIDE 0.9 % (FLUSH) 0.9 %
3-10 SYRINGE (ML) INJECTION AS NEEDED
Status: DISCONTINUED | OUTPATIENT
Start: 2022-02-08 | End: 2022-02-08 | Stop reason: HOSPADM

## 2022-02-08 RX ORDER — NALOXONE HCL 0.4 MG/ML
0.2 VIAL (ML) INJECTION AS NEEDED
Status: DISCONTINUED | OUTPATIENT
Start: 2022-02-08 | End: 2022-02-08 | Stop reason: HOSPADM

## 2022-02-08 RX ORDER — LIDOCAINE HYDROCHLORIDE 10 MG/ML
0.5 INJECTION, SOLUTION EPIDURAL; INFILTRATION; INTRACAUDAL; PERINEURAL ONCE AS NEEDED
Status: COMPLETED | OUTPATIENT
Start: 2022-02-08 | End: 2022-02-08

## 2022-02-08 RX ORDER — SODIUM CHLORIDE 0.9 % (FLUSH) 0.9 %
3 SYRINGE (ML) INJECTION EVERY 12 HOURS SCHEDULED
Status: DISCONTINUED | OUTPATIENT
Start: 2022-02-08 | End: 2022-02-08 | Stop reason: HOSPADM

## 2022-02-08 RX ORDER — FENTANYL CITRATE 50 UG/ML
50 INJECTION, SOLUTION INTRAMUSCULAR; INTRAVENOUS
Status: DISCONTINUED | OUTPATIENT
Start: 2022-02-08 | End: 2022-02-08 | Stop reason: HOSPADM

## 2022-02-08 RX ORDER — DIPHENHYDRAMINE HYDROCHLORIDE 50 MG/ML
12.5 INJECTION INTRAMUSCULAR; INTRAVENOUS
Status: DISCONTINUED | OUTPATIENT
Start: 2022-02-08 | End: 2022-02-08 | Stop reason: HOSPADM

## 2022-02-08 RX ORDER — ACETAMINOPHEN 325 MG/1
650 TABLET ORAL 2 TIMES DAILY PRN
Qty: 60 TABLET | Refills: 0 | Status: SHIPPED | OUTPATIENT
Start: 2022-02-08 | End: 2022-03-02

## 2022-02-08 RX ORDER — HYDROCODONE BITARTRATE AND ACETAMINOPHEN 7.5; 325 MG/1; MG/1
1 TABLET ORAL ONCE AS NEEDED
Status: COMPLETED | OUTPATIENT
Start: 2022-02-08 | End: 2022-02-08

## 2022-02-08 RX ORDER — HYDROMORPHONE HYDROCHLORIDE 1 MG/ML
0.5 INJECTION, SOLUTION INTRAMUSCULAR; INTRAVENOUS; SUBCUTANEOUS
Status: DISCONTINUED | OUTPATIENT
Start: 2022-02-08 | End: 2022-02-08 | Stop reason: HOSPADM

## 2022-02-08 RX ORDER — EPHEDRINE SULFATE 50 MG/ML
5 INJECTION, SOLUTION INTRAVENOUS ONCE AS NEEDED
Status: DISCONTINUED | OUTPATIENT
Start: 2022-02-08 | End: 2022-02-08 | Stop reason: HOSPADM

## 2022-02-08 RX ORDER — HYDRALAZINE HYDROCHLORIDE 20 MG/ML
5 INJECTION INTRAMUSCULAR; INTRAVENOUS
Status: DISCONTINUED | OUTPATIENT
Start: 2022-02-08 | End: 2022-02-08 | Stop reason: HOSPADM

## 2022-02-08 RX ORDER — IBUPROFEN 600 MG/1
600 TABLET ORAL ONCE AS NEEDED
Status: DISCONTINUED | OUTPATIENT
Start: 2022-02-08 | End: 2022-02-08 | Stop reason: HOSPADM

## 2022-02-08 RX ORDER — OXYCODONE AND ACETAMINOPHEN 7.5; 325 MG/1; MG/1
1 TABLET ORAL EVERY 4 HOURS PRN
Status: DISCONTINUED | OUTPATIENT
Start: 2022-02-08 | End: 2022-02-08 | Stop reason: HOSPADM

## 2022-02-08 RX ORDER — LABETALOL HYDROCHLORIDE 5 MG/ML
5 INJECTION, SOLUTION INTRAVENOUS
Status: DISCONTINUED | OUTPATIENT
Start: 2022-02-08 | End: 2022-02-08 | Stop reason: HOSPADM

## 2022-02-08 RX ORDER — ISOPROPYL ALCOHOL 70 ML/100ML
LIQUID TOPICAL AS NEEDED
Status: DISCONTINUED | OUTPATIENT
Start: 2022-02-08 | End: 2022-02-08 | Stop reason: HOSPADM

## 2022-02-08 RX ORDER — DIPHENHYDRAMINE HCL 25 MG
25 CAPSULE ORAL
Status: DISCONTINUED | OUTPATIENT
Start: 2022-02-08 | End: 2022-02-08 | Stop reason: HOSPADM

## 2022-02-08 RX ORDER — MIDAZOLAM HYDROCHLORIDE 1 MG/ML
0.5 INJECTION INTRAMUSCULAR; INTRAVENOUS
Status: DISCONTINUED | OUTPATIENT
Start: 2022-02-08 | End: 2022-02-08 | Stop reason: HOSPADM

## 2022-02-08 RX ORDER — LIDOCAINE HYDROCHLORIDE 20 MG/ML
INJECTION, SOLUTION INFILTRATION; PERINEURAL AS NEEDED
Status: DISCONTINUED | OUTPATIENT
Start: 2022-02-08 | End: 2022-02-08 | Stop reason: SURG

## 2022-02-08 RX ORDER — BUPIVACAINE HYDROCHLORIDE 5 MG/ML
INJECTION, SOLUTION EPIDURAL; INTRACAUDAL
Status: COMPLETED | OUTPATIENT
Start: 2022-02-08 | End: 2022-02-08

## 2022-02-08 RX ORDER — DROPERIDOL 2.5 MG/ML
INJECTION, SOLUTION INTRAMUSCULAR; INTRAVENOUS AS NEEDED
Status: DISCONTINUED | OUTPATIENT
Start: 2022-02-08 | End: 2022-02-08 | Stop reason: SURG

## 2022-02-08 RX ORDER — ONDANSETRON 4 MG/1
4 TABLET, FILM COATED ORAL EVERY 8 HOURS PRN
Qty: 30 TABLET | Refills: 0 | Status: SHIPPED | OUTPATIENT
Start: 2022-02-08 | End: 2022-05-24

## 2022-02-08 RX ORDER — DEXAMETHASONE SODIUM PHOSPHATE 10 MG/ML
INJECTION INTRAMUSCULAR; INTRAVENOUS AS NEEDED
Status: DISCONTINUED | OUTPATIENT
Start: 2022-02-08 | End: 2022-02-08 | Stop reason: SURG

## 2022-02-08 RX ORDER — ONDANSETRON 2 MG/ML
4 INJECTION INTRAMUSCULAR; INTRAVENOUS ONCE AS NEEDED
Status: DISCONTINUED | OUTPATIENT
Start: 2022-02-08 | End: 2022-02-08 | Stop reason: HOSPADM

## 2022-02-08 RX ORDER — DOCUSATE SODIUM 100 MG/1
100 CAPSULE, LIQUID FILLED ORAL 2 TIMES DAILY
Qty: 60 CAPSULE | Refills: 0 | Status: SHIPPED | OUTPATIENT
Start: 2022-02-08 | End: 2022-05-24

## 2022-02-08 RX ORDER — SODIUM CHLORIDE, SODIUM LACTATE, POTASSIUM CHLORIDE, CALCIUM CHLORIDE 600; 310; 30; 20 MG/100ML; MG/100ML; MG/100ML; MG/100ML
9 INJECTION, SOLUTION INTRAVENOUS CONTINUOUS
Status: DISCONTINUED | OUTPATIENT
Start: 2022-02-08 | End: 2022-02-08 | Stop reason: HOSPADM

## 2022-02-08 RX ORDER — HYDROCODONE BITARTRATE AND ACETAMINOPHEN 7.5; 325 MG/1; MG/1
1-2 TABLET ORAL EVERY 4 HOURS PRN
Qty: 42 TABLET | Refills: 0 | Status: SHIPPED | OUTPATIENT
Start: 2022-02-08 | End: 2022-02-14

## 2022-02-08 RX ORDER — CEFAZOLIN SODIUM 2 G/100ML
2 INJECTION, SOLUTION INTRAVENOUS ONCE
Status: COMPLETED | OUTPATIENT
Start: 2022-02-08 | End: 2022-02-08

## 2022-02-08 RX ORDER — PROMETHAZINE HYDROCHLORIDE 25 MG/1
25 SUPPOSITORY RECTAL ONCE AS NEEDED
Status: DISCONTINUED | OUTPATIENT
Start: 2022-02-08 | End: 2022-02-08 | Stop reason: HOSPADM

## 2022-02-08 RX ORDER — FLUMAZENIL 0.1 MG/ML
0.2 INJECTION INTRAVENOUS AS NEEDED
Status: DISCONTINUED | OUTPATIENT
Start: 2022-02-08 | End: 2022-02-08 | Stop reason: HOSPADM

## 2022-02-08 RX ADMIN — MIDAZOLAM 0.5 MG: 1 INJECTION INTRAMUSCULAR; INTRAVENOUS at 06:39

## 2022-02-08 RX ADMIN — FENTANYL CITRATE 50 MCG: 50 INJECTION INTRAMUSCULAR; INTRAVENOUS at 07:44

## 2022-02-08 RX ADMIN — BUPIVACAINE 10 ML: 13.3 INJECTION, SUSPENSION, LIPOSOMAL INFILTRATION at 07:04

## 2022-02-08 RX ADMIN — PROPOFOL 250 MG: 10 INJECTION, EMULSION INTRAVENOUS at 07:16

## 2022-02-08 RX ADMIN — SODIUM CHLORIDE, POTASSIUM CHLORIDE, SODIUM LACTATE AND CALCIUM CHLORIDE 9 ML/HR: 600; 310; 30; 20 INJECTION, SOLUTION INTRAVENOUS at 06:39

## 2022-02-08 RX ADMIN — FENTANYL CITRATE 50 MCG: 50 INJECTION INTRAMUSCULAR; INTRAVENOUS at 06:39

## 2022-02-08 RX ADMIN — PROPOFOL 25 MCG/KG/MIN: 10 INJECTION, EMULSION INTRAVENOUS at 07:20

## 2022-02-08 RX ADMIN — CEFAZOLIN SODIUM 2 G: 2 INJECTION, SOLUTION INTRAVENOUS at 07:19

## 2022-02-08 RX ADMIN — DEXAMETHASONE SODIUM PHOSPHATE 8 MG: 10 INJECTION INTRAMUSCULAR; INTRAVENOUS at 07:35

## 2022-02-08 RX ADMIN — FENTANYL CITRATE 50 MCG: 50 INJECTION INTRAMUSCULAR; INTRAVENOUS at 09:02

## 2022-02-08 RX ADMIN — HYDROCODONE BITARTRATE AND ACETAMINOPHEN 1 TABLET: 7.5; 325 TABLET ORAL at 09:08

## 2022-02-08 RX ADMIN — LIDOCAINE HYDROCHLORIDE 100 MG: 20 INJECTION, SOLUTION INFILTRATION; PERINEURAL at 07:16

## 2022-02-08 RX ADMIN — DROPERIDOL 0.62 MG: 2.5 INJECTION, SOLUTION INTRAMUSCULAR; INTRAVENOUS at 07:55

## 2022-02-08 RX ADMIN — HYDROMORPHONE HYDROCHLORIDE 0.5 MG: 1 INJECTION, SOLUTION INTRAMUSCULAR; INTRAVENOUS; SUBCUTANEOUS at 09:21

## 2022-02-08 RX ADMIN — FAMOTIDINE 20 MG: 10 INJECTION INTRAVENOUS at 06:39

## 2022-02-08 RX ADMIN — BUPIVACAINE HYDROCHLORIDE 10 ML: 5 INJECTION, SOLUTION EPIDURAL; INTRACAUDAL; PERINEURAL at 07:04

## 2022-02-08 RX ADMIN — LIDOCAINE HYDROCHLORIDE 0.5 ML: 10 INJECTION, SOLUTION EPIDURAL; INFILTRATION; INTRACAUDAL; PERINEURAL at 06:39

## 2022-02-08 RX ADMIN — ONDANSETRON 4 MG: 2 INJECTION INTRAMUSCULAR; INTRAVENOUS at 07:56

## 2022-02-08 NOTE — ANESTHESIA PROCEDURE NOTES
Airway  Urgency: elective    Date/Time: 2/8/2022 7:17 AM  Airway not difficult    General Information and Staff    Patient location during procedure: OR  CRNA: Vanesa Hernandez CRNA    Indications and Patient Condition  Indications for airway management: airway protection    Preoxygenated: yes  MILS not maintained throughout  Mask difficulty assessment: 1 - vent by mask    Final Airway Details  Final airway type: supraglottic airway      Successful airway: classic and LMA  Size 5    Number of attempts at approach: 1  Assessment: lips, teeth, and gum same as pre-op    Additional Comments  Inflated to seal.

## 2022-02-08 NOTE — H&P
History & Physical       Patient: Federico Angeles    YOB: 1955    Medical Record Number: [id number]    Chief Complaints: Preop for right shoulder arthroscopy    History of Present Illness: 66 y.o. male presents today in anticipation of upcoming surgery.  He reports persistent severe pain in the right shoulder.  His pain is fairly minimal at rest but severe with certain reaching and lifting movements.  He does have night pain and difficulty sleeping.  He has tried activity modifications, anti-inflammatories and 1 previous injection.  We had discussed his MRI results over the phone.    Allergies: No Known Allergies    Home Medications:    Current Facility-Administered Medications:   •  bupivacaine liposome (EXPAREL) 1.3 % injection 266 mg, 20 mL, Injection, Once, Jackson Moralez MD  •  ceFAZolin in dextrose (ANCEF) IVPB solution 2 g, 2 g, Intravenous, Once, Jackson Moralez MD  •  fentaNYL citrate (PF) (SUBLIMAZE) injection 50 mcg, 50 mcg, Intravenous, Q10 Min PRN, Mallika Linares MD, 50 mcg at 02/08/22 0639  •  lactated ringers infusion, 9 mL/hr, Intravenous, Continuous, Mallika Linares MD, Last Rate: 9 mL/hr at 02/08/22 0639, 9 mL/hr at 02/08/22 0639  •  midazolam (VERSED) injection 0.5 mg, 0.5 mg, Intravenous, Q10 Min PRN, Mallika Linares MD, 0.5 mg at 02/08/22 0639  •  sodium chloride 0.9 % flush 3 mL, 3 mL, Intravenous, Q12H, Mallika Linares MD  •  sodium chloride 0.9 % flush 3-10 mL, 3-10 mL, Intravenous, PRN, Mallika Linares MD    Past Medical History:   Diagnosis Date   • Generalized headaches    • Insomnia    • Leg cramps    • PONV (postoperative nausea and vomiting)    • Tinnitus           Past Surgical History:   Procedure Laterality Date   • NECK SURGERY  1979 and 1989          Social History     Occupational History   • Occupation: air      Employer: [employer]   Tobacco Use   • Smoking status: Never Smoker   • Smokeless tobacco:  Never Used   Vaping Use   • Vaping Use: Never used   Substance and Sexual Activity   • Alcohol use: No   • Drug use: No   • Sexual activity: Defer      Social History     Social History Narrative    2 grown children    2 grandchildren          Family History   Problem Relation Age of Onset   • Heart attack Father 55        drinker   • Liver disease Father    • No Known Problems Sister    • No Known Problems Brother    • Malig Hyperthermia Neg Hx        Review of Systems:  A 14 point review of systems is reviewed with the patient.  Pertinent positives are listed above.  All others are negative.    Physical Exam: 66 y.o. male    Vitals:    02/08/22 0611   BP: 146/91   Pulse: 73   Resp: 16   Temp: 98.4 °F (36.9 °C)   TempSrc: Oral   SpO2: 97%       General:  Patient is awake and alert.  Appears in no acute distress or discomfort.    Psych:  Affect and demeanor are appropriate.    Cardiovascular:  Regular rate and rhythm.    Lungs:  Good chest expansion.  Breathing unlabored.    Lymph:  No palpable adenopathy about neck or axilla.    Right shoulder: Skin benign and intact without evidence for swelling, masses or atrophy.  Exam otherwise deferred at this time.    Diagnostic Tests:  Lab Results   Component Value Date    GLUCOSE 114 (H) 01/25/2022    CALCIUM 9.1 01/25/2022     01/25/2022    K 4.1 01/25/2022    CO2 27.8 01/25/2022     01/25/2022    BUN 13 01/25/2022    CREATININE 0.78 01/25/2022    EGFRIFAFRI 130 05/14/2021    EGFRIFNONA 99 01/25/2022    BCR 16.7 01/25/2022    ANIONGAP 9.2 01/25/2022     Lab Results   Component Value Date    WBC 7.12 01/25/2022    HGB 16.1 01/25/2022    HCT 46.7 01/25/2022    MCV 87.8 01/25/2022     01/25/2022     No results found for: INR, PROTIME     MRI right shoulder is reviewed along the associated report.  He appears to have a superior labral tear with detachment of the biceps anchor.  He has impingement morphology with a low-grade partial-thickness articular sided  rotator cuff tear.  He has significant acromioclavicular arthritis with what appears to be subarticular impingement as well.    Assessment:  Right glenoid labral tear, impingement, acromioclavicular arthritis and partial-thickness rotator cuff tear    Plan: We had a thorough discussion regarding the risks, benefits and alternatives to an arthroscopic labral debridement versus repair, biceps tenotomy versus tenodesis, subacromial decompression with distal clavicle excision and indicated procedures.  I explained that surgical risks include infection, hematoma, persistent pain and/or loss of motion, iatrogenic nerve and/or blood vessel injury resulting in permanent weakness, numbness or dysfunction, RSD, DVT, PE, positioning related neuropraxia, and anesthesia related complications resulting in death.  We further discussed the possible need to address any rotator cuff, labral and/or biceps pathology, if found at the time of the surgery.  I explained to patient that I would certainly plan to address this in the same surgical setting if we were to identify any unexpected pathology.  We discussed the risk associated with this including possible anchor related complications including failure of fixation, loosening, cutout of the anchors, chondrolysis, re-tear necessitating revision surgery.  We also discussed a possible tenotomy versus tenodesis of the biceps, just depending on where the pathology is located.  We discussed the fact that if the biceps demonstrates significant pathology in the groove that I would plan to perform a tenotomy which could result in alberto deformity or persistent pain, weakness or cramping.  We also discussed possible risks with a tenodesis as well including screw related complications including cutout, chondrolysis, failure of fixation with re-tear of the biceps, fracture and possible iatrogenic nerve injury.  He voiced understanding of the risks, benefits, and alternative forms of treatment  that were discussed and consents to proceed.  No guarantees were given regarding the results of this procedure.     Jackson Moralez MD  02/08/22

## 2022-02-08 NOTE — ANESTHESIA POSTPROCEDURE EVALUATION
Patient: Federico Angeles    Procedure Summary     Date: 02/08/22 Room / Location:  [facility]    Anesthesia Start: 0712 Anesthesia Stop: 0842    Procedure: SHOULDER ARTHROSCOPY WITH SUBACROMIAL DECOMPRESSION,  BICEPS TENODESIS , ROTATATOR CUFF REPAIR (Right Shoulder) Diagnosis:       Right shoulder pain, unspecified chronicity      (Right shoulder pain, unspecified chronicity [M25.511])    Surgeons: Jackson Moralez MD Provider: Dk Galeano MD    Anesthesia Type: Not recorded ASA Status: 2          Anesthesia Type: No value filed.    Vitals  Vitals Value Taken Time   /93 02/08/22 0931   Temp 36.4 °C (97.5 °F) 02/08/22 0930   Pulse 68 02/08/22 0934   Resp 16 02/08/22 0930   SpO2 96 % 02/08/22 0934   Vitals shown include unvalidated device data.        Post Anesthesia Care and Evaluation    Patient participation: complete - patient participated  Level of consciousness: awake  Pain management: adequate  Airway patency: patent  Anesthetic complications: No anesthetic complications    Cardiovascular status: acceptable  Respiratory status: acceptable  Hydration status: acceptable    Comments: /93 (BP Location: Left arm, Patient Position: Lying)   Pulse 66   Temp 36.4 °C (97.5 °F) (Temporal)   Resp 16   SpO2 96%

## 2022-02-08 NOTE — ANESTHESIA PROCEDURE NOTES
Peripheral Block      Patient reassessed immediately prior to procedure    Patient location during procedure: pre-op  Start time: 2/8/2022 6:57 AM  Stop time: 2/8/2022 7:04 AM  Reason for block: at surgeon's request and post-op pain management  Performed by  Anesthesiologist: Dk Galeano MD  Preanesthetic Checklist  Completed: patient identified, IV checked, site marked, risks and benefits discussed, surgical consent, monitors and equipment checked, pre-op evaluation and timeout performed  Prep:  Pt Position: supine  Sterile barriers:gloves and cap  Prep: ChloraPrep  Patient monitoring: blood pressure monitoring, continuous pulse oximetry and EKG  Procedure    Sedation: yes    Guidance:ultrasound guided    ULTRASOUND INTERPRETATION.  Using ultrasound guidance a 21 G gauge needle was placed in close proximity to the brachial plexus nerve, at which point, under ultrasound guidance anesthetic was injected in the area of the nerve and spread of the anesthesia was seen on ultrasound in close proximity thereto.  There were no abnormalities seen on ultrasound; a digital image was taken; and the patient tolerated the procedure with no complications. Images:still images obtained    Laterality:right  Block Type:interscalene  Injection Technique:single-shot  Needle Type:echogenic  Needle Gauge:21 G  Resistance on Injection: none    Medications Used: bupivacaine liposome (EXPAREL) 1.3 % injection, 10 mL  bupivacaine (PF) (MARCAINE) 0.5 % injection, 10 mL  Med administered at 2/8/2022 7:04 AM      Post Assessment  Injection Assessment: negative aspiration for heme, no paresthesia on injection and incremental injection  Patient Tolerance:comfortable throughout block  Complications:no  Additional Notes  Ultrasound guidance was used to view and verify needle placement and medication disbursement.

## 2022-02-08 NOTE — OP NOTE
Orthopaedic Operative Note    Facility: [facility]    Patient: Federico Angeles    Medical Record Number: [id number]    YOB: 1955    Dictating Surgeon: Jackson Moralez M.D.*    Primary Care Physician: Ying Villatoro MD    Date of Operation: 2/8/2022    Pre-Operative Diagnosis:  Right glenoid labral tear, subacromial impingement, acromioclavicular arthritis with subarticular impingement    Post-Operative Diagnosis: High-grade partial, near full-thickness bursal sided posterior supraspinatus tendon tear, glenoid labral tear with biceps instability, impingement and symptomatic acromioclavicular arthritis    Procedure Performed:    1.  Right shoulder arthroscopic rotator cuff repair including subacromial decompression  2.  Arthroscopic biceps tenodesis    3.  Arthroscopic distal clavicle excision    Surgeon: Jackson Moralez MD     Assistant: Geovanna Griffin whose assistance was critical for help with patient positioning, suctioning and irrigation, retraction, manipulation of the extremity for insertion of the implants, wound closure and application of the bandages.  Her assistance was critical to the success of this case.     Anesthesia: Regional followed by general    Complications: None.     Estimated Blood Loss: Less than 50 mL.     Implants: Arthrex triple loaded fiber tack anchor for medial row rotator cuff repair.  1 Arthrex 4.75 mm swivel lock anchor for double row repair.  1 Arthrex 4.75 mm swivel lock anchor for arthroscopic biceps tenodesis    Specimens: * No orders in the log *    Brief Operative Indication: Mr. Angeles had a long history of right shoulder pain which had been nonresponsive to prolonged conservative treatment.  We had a thorough discussion regarding the risks, benefits and alternatives to surgical intervention.  I explained that surgical risks include infection, hematoma, anchor related complications including failure of fixation, loosening, cutout of the anchors,  chondrolysis, rotator cuff re-tear necessitating revision surgery, persistent pain and/or loss of motion, iatrogenic nerve and/or blood vessel injury resulting in permanent weakness, numbness or dysfunction, RSD, DVT, PE, positioning related neuropraxia, and anesthesia related complications resulting in death.  We further discussed the possible need to address the biceps with a possible tenotomy versus tenodesis.  We discussed the fact that if the biceps demonstrates significant pathology in the groove that I would plan to perform a tenotomy which could result in alberto deformity or persistent pain, weakness or cramping.  We also discussed possible risks with a tenodesis as well including screw related complications including cutout, chondrolysis, failure of fixation with re-tear of the biceps, fracture and possible iatrogenic nerve injury.  The patient voiced understanding of the risks, benefits, and alternative forms of treatment that were discussed and consented to proceed.    Description of the procedure in detail:  Mr. Angeles and the operative site were identified in the preoperative holding area and the surgical site was marked.  Adequate regional anesthesia of his right upper extremity was administered.  Following this, he was taken to the operating room and placed in the supine position.  Adequate general anesthesia was then administered and the patient was repositioned on the operating table.  All bony prominences were carefully padded and protected while the patient was positioned in the lateral decubitus position.  His arm was prepped and draped in the standard, sterile fashion.  I cleaned the extremity with an alcohol solution.  A Hibiclens scrub was performed and then the extremity was prepped with 2 ChloraPrep preps.  I allowed those to dry for 3 minutes before the draping procedure was carried out and the arm placed into 10 pounds of lateral traction.  A timeout was taken and preoperative antibiotics  administered prior to surgical incision.    I began the procedure by fashioning a standard posterior portal.  The scope was inserted into the joint and directed anteriorly to the rotator interval where a standard anterior portal was then established using needle localization technique.  A 7 mm cannula was inserted into the joint and then a diagnostic arthroscopy performed.    The rotator cuff was intact including the visible portions of the subscapularis, supraspinatus, infraspinatus and teres minor.  There appeared to be some minor fraying of the supraspinatus and infraspinatus but the depth of this tearing was no more than a millimeter or 2 and I determined that no repair was indicated based on the low-grade nature of the tearing.  His articular cartilage was well preserved.  No significant chondral pathology was noted.      The biceps anchor was detached from the supra-glenoid tubercle and there was degenerative tearing of the superior labrum.  I used a probe to examine the intertubercular portion of the biceps and this demonstrated instability.  I determined that a biceps tenodesis was warranted.  The long head of the biceps was released from the supraglenoid tubercle in anticipation of a tenodesis of that structure.  The biceps stump and degenerative superior labral tear were then debrided with a shaver.  The remainder of the labrum was probed and confirmed to be intact and stable.    I directed my attention to the subacromial space.  A standard lateral portal was established and the scope was inserted into the subacromial space.  There was extensive subacromial/subdeltoid bursitis which was debrided with a shaver.  There was fraying of the coracoacromial ligament and a modest subacromial spur consistent with impingement.  I determined that a subacromial decompression was indicated.  Using a 90 degree Arthrex Apollo device, the coracoacromial ligament was released.  A barrel tipped grecia was then used to level  the undersurface of the acromion back to the level of the scapular spine.  The rotator cuff was examined at this point.      Unexpectedly, he did have a high-grade partial-thickness tear of the posterior supraspinatus.  I measured the depth of this tearing and extended to well over a centimeter.  This was not expected based on his MRI.  The width of this tear was about a centimeter, may be 1.2 or so.  There was probably a millimeter or 2 of intact tissue left but given the high-grade nature of the tearing, I determined that a repair was indicated.    The insertion site was debrided back to bleeding, healthy appearing bone to create a healing response.  I microfracture the tuberosity to create a bone marrow healing response.  Next, an accessory portal was established just off the lateral edge of the acromion.  Through this portal, a triple loaded anchor was placed at the articular cartilage margin.  This anchor seated well and got great fixation in the bone.    A self retrieving scorpion was then used to pass the sutures through the torn rotator cuff tissue.  This tissue was good quality and easily opposable to the cuff insertion site.  Mattress sutures were placed and then a simple stitch was placed at the far posterior edge of the tear.  The sutures were then tied using a 6 throw surgeon's knot.  The sutures from the medial row were then brought out to a lateral row anchor which was punched and placed on the lateral aspect of the tuberosity without complication.  The excess sutures were cut and removed.  The repair looked anatomic.    Next, I directed my attention to the distal clavicle excision.  The acromioclavicular joint was exposed.  There was extensive arthropathy and spurring off of the inferior portion of the clavicle.  The Arthrex Apollo device was inserted through the anterior portal and used to release the inferior capsule.  The distal clavicle was exposed.  I removed approximately 8-10 mm of bone from  the distal clavicle under direct visualization.  Once I had completed the distal clavicle excision, I confirmed that there were no amputated fragments or remaining areas that warranted debridement by inserting the scope in both the lateral and anterior portals to visualize directly into the acromioclavicular joint.  I confirmed that this space was completely decompressed.  Final images were taken and saved.    Finally, I directed my attention to the tenodesis.  A combination of a shaver and Arthrex Orford device were used to remove the bursal tissue extending down along the lateral edge of the proximal humerus.  I very carefully dissected out the biceps in the groove at the level of the falciform ligament.  The long head of the biceps was carefully exposed at the top of the groove.  An accessory portal was established over the top of the biceps groove.  Through this portal, a second 4.75 mm anchor was punched and placed in the center of the biceps groove.  This anchor seated well.  The sutures from the anchor were sequentially passed through the biceps tendon using the self retrieving scorpion.  The sutures were then tied using a 6 throw surgeon's knot.  This secured the biceps well.  This was probed and the tendon seemed to be well fixed and secure.  Final images were taken including the biceps and rotator cuff repair.  No further pathology was noted..    The instruments were withdrawn.  The wounds were copiously irrigated out with sterile saline.  The wounds were closed in a layered fashion.  Sterile dressings were applied.  The drapes were withdrawn.  The arm was placed in an immobilizer.  The patient was awakened and taken to the recovery room in good condition.    Jackson Moralez MD  02/08/22

## 2022-02-08 NOTE — BRIEF OP NOTE
SHOULDER ARTHROSCOPY  Progress Note    Federico Angeles  2/8/2022    Pre-op Diagnosis:   Right shoulder pain, unspecified chronicity [M25.511]       Post-Op Diagnosis Codes:     * Right shoulder pain, unspecified chronicity [M25.511]    Procedure/CPT® Codes:        Procedure(s):  SHOULDER ARTHROSCOPY, rotator cuff repair, biceps tenodesis, distal clavicle excision    Surgeon(s):  Jackson Moralez MD    Anesthesia: General with Block    Staff:   Circulator: Orin Green RN  Scrub Person: Tran Manzanares  Assistant: Geovanna Griffin  Assistant: Geovanna Griffin      Estimated Blood Loss: minimal    Urine Voided: * No values recorded between 2/8/2022 12:00 AM and 2/8/2022  7:12 AM *    Specimens:                None          Drains: * No LDAs found *    Findings: see dictation    Complications: none    Assistant: Geovanna Griffin  was responsible for performing the following activities: Retraction and their skilled assistance was necessary for the success of this case.    Jackson Moralez MD     Date: 2/8/2022  Time: 0841

## 2022-02-08 NOTE — ANESTHESIA PREPROCEDURE EVALUATION
Anesthesia Evaluation     Patient summary reviewed and Nursing notes reviewed   history of anesthetic complications: PONV  NPO Solid Status: > 8 hours  NPO Liquid Status: > 2 hours           Airway   Mallampati: II  TM distance: >3 FB  Neck ROM: full  No difficulty expected  Dental - normal exam     Pulmonary - normal exam   (-) not a smoker  Cardiovascular - normal exam        Neuro/Psych  (+) headaches,     GI/Hepatic/Renal/Endo      Musculoskeletal     Abdominal  - normal exam   Substance History      OB/GYN          Other                        Anesthesia Plan    ASA 2   (ISB for POPC per surgeon request    PONV prophylaxis, propofol gtt background    I have reviewed the patient's history and chart with the patient, including all pertinent laboratory results and imaging. I have explained the risks of anesthesia including but not limited to dental damage, corneal abrasion, nerve injury, MI, stroke, aspiration, and death. Patient has agreed to proceed.     Risks of peripheral nerve block were discussed with patient including but not limited to: inadequate block, bleeding, infection, persistent numbness or weakness, nerve damage, painful dysasthesia and need to protect limb while numb.    /91   Pulse 73   Temp 36.9 °C (98.4 °F) (Oral)   Resp 16   SpO2 97%   )  intravenous induction     Anesthetic plan, all risks, benefits, and alternatives have been provided, discussed and informed consent has been obtained with: patient.    Plan discussed with CRNA.        CODE STATUS:

## 2022-02-09 ENCOUNTER — TELEPHONE (OUTPATIENT)
Dept: ORTHOPEDIC SURGERY | Facility: CLINIC | Age: 67
End: 2022-02-09

## 2022-02-09 NOTE — TELEPHONE ENCOUNTER
Postop follow-up call.  I spoke to Mr. Angeles.  Reports he is doing well.  His block is still working somewhat.  I instructed him to postpone pendulum exercises until he has full sensation return to his arm.  We discussed other postop care instructions.  I encouraged him to call with any questions or concerns prior to his follow-up appointment next week.  He tells me he would like to attend physical therapy at [facility].  I instructed him to contact them to schedule an appointment for the week following his appointment with me.  He verbalized understanding of all we discussed and appreciated the assistance.

## 2022-02-14 ENCOUNTER — TELEPHONE (OUTPATIENT)
Dept: ORTHOPEDIC SURGERY | Facility: CLINIC | Age: 67
End: 2022-02-14

## 2022-02-14 DIAGNOSIS — Z09 SURGERY FOLLOW-UP: Primary | ICD-10-CM

## 2022-02-14 RX ORDER — OXYCODONE AND ACETAMINOPHEN 7.5; 325 MG/1; MG/1
TABLET ORAL
Qty: 18 TABLET | Refills: 0 | Status: SHIPPED | OUTPATIENT
Start: 2022-02-14 | End: 2022-02-16 | Stop reason: SDUPTHER

## 2022-02-14 NOTE — TELEPHONE ENCOUNTER
I spoke to Mr. Angeles.  Reports the hydrocodone does not seem to be helping with his pain.  I offered to prescribe Percocet for him.  I explained that I can only provide him with 3 days worth.  If this medication seems to help, he may request a refill from Dr. Moralez upon his return to the office.  Patient agreed and would like to try the Percocet.  The risks of this medication were discussed.  Going forward, he will follow-up with me on Wednesday as previously scheduled.

## 2022-02-14 NOTE — TELEPHONE ENCOUNTER
----- Message from Evy Mae MA sent at 2/14/2022 10:41 AM EST -----  Regarding: Hydrocodone   Please review    ----- Message -----  From: Federico Angeles  Sent: 2/14/2022  10:39 AM EST  To: [facility]  Subject: Hydrocodone                                      The hydrocodone doesn't seem to help at all. I don't get any relief from it. I take the Tylenol and gabepentin during the day twice. It helps some. Anything else that may work? I only need it at night.  Dae Angeles

## 2022-02-16 ENCOUNTER — OFFICE VISIT (OUTPATIENT)
Dept: ORTHOPEDIC SURGERY | Facility: CLINIC | Age: 67
End: 2022-02-16

## 2022-02-16 VITALS — HEIGHT: 73 IN | TEMPERATURE: 98 F | BODY MASS INDEX: 30.09 KG/M2 | WEIGHT: 227 LBS

## 2022-02-16 DIAGNOSIS — Z09 SURGERY FOLLOW-UP: Primary | ICD-10-CM

## 2022-02-16 PROCEDURE — 99024 POSTOP FOLLOW-UP VISIT: CPT | Performed by: NURSE PRACTITIONER

## 2022-02-16 RX ORDER — OXYCODONE AND ACETAMINOPHEN 7.5; 325 MG/1; MG/1
TABLET ORAL
Qty: 18 TABLET | Refills: 0 | Status: SHIPPED | OUTPATIENT
Start: 2022-02-16 | End: 2022-02-23 | Stop reason: SDUPTHER

## 2022-02-16 NOTE — PROGRESS NOTES
Federico Angeles : 1955 MRN: [id number] DATE: 2022    CC: 1 week s/p right shoulder rotator cuff repair, biceps tenodesis, DCE    HPI: Patient returns to clinic today for follow up.  Reports pain is well controlled.  Denies fevers, drainage, redness or other concerning symptoms.  Reports compliance with use of the sling.    Vitals:    22 1535   Temp: 98 °F (36.7 °C)     Exam:  Wounds appear well-approximated.  Arm and forearm soft.  Shoulder moves fluidly with pendulums.  Good motor and sensory function in the hand and wrist.  Palpable radial pulse with brisk capillary refill    Impression:  1 week s/p right shoulder rotator cuff repair, biceps tenodesis, DCE    Plan:    1.  Begin PT per protocol--prescription given as well as 2 copies of my protocol.  2.  Continue shoulder immobilizer.  Instructions about the importance of compliance with this were carefully discussed.  3.  I have given him a prescription for Percocet for 3 days.  This is a new request because his pharmacy was out of stock.   4.  Counseled the patient about appropriate activity modifications and restrictions, including no driving at this point.  5.  Follow up in 5 weeks with Dr. Moralez.     Nyla Yepez, APRN     2022      Answers for HPI/ROS submitted by the patient on 2022  What is the primary reason for your visit?: Other  Please describe your symptoms.: Shoulder surgery follow-up  Have you had these symptoms before?: Yes  How long have you been having these symptoms?: Greater than 2 weeks

## 2022-02-23 DIAGNOSIS — Z09 SURGERY FOLLOW-UP: ICD-10-CM

## 2022-02-23 RX ORDER — OXYCODONE AND ACETAMINOPHEN 7.5; 325 MG/1; MG/1
TABLET ORAL
Qty: 42 TABLET | Refills: 0 | Status: SHIPPED | OUTPATIENT
Start: 2022-02-23 | End: 2022-05-24

## 2022-03-02 ENCOUNTER — OFFICE VISIT (OUTPATIENT)
Dept: INTERNAL MEDICINE | Facility: CLINIC | Age: 67
End: 2022-03-02

## 2022-03-02 VITALS
DIASTOLIC BLOOD PRESSURE: 90 MMHG | WEIGHT: 221.9 LBS | BODY MASS INDEX: 29.41 KG/M2 | SYSTOLIC BLOOD PRESSURE: 128 MMHG | TEMPERATURE: 97.7 F | HEIGHT: 73 IN | HEART RATE: 86 BPM | OXYGEN SATURATION: 100 %

## 2022-03-02 DIAGNOSIS — I10 HYPERTENSION, UNSPECIFIED TYPE: Primary | ICD-10-CM

## 2022-03-02 DIAGNOSIS — M25.511 ACUTE PAIN OF RIGHT SHOULDER: ICD-10-CM

## 2022-03-02 PROCEDURE — 99214 OFFICE O/P EST MOD 30 MIN: CPT | Performed by: INTERNAL MEDICINE

## 2022-03-02 RX ORDER — CALCIUM CARBONATE 300MG(750)
1 TABLET,CHEWABLE ORAL DAILY
COMMUNITY
End: 2022-11-16

## 2022-03-02 RX ORDER — METOPROLOL SUCCINATE 25 MG/1
25 TABLET, EXTENDED RELEASE ORAL DAILY
Qty: 30 TABLET | Refills: 2 | Status: SHIPPED | OUTPATIENT
Start: 2022-03-02 | End: 2022-03-29 | Stop reason: SDUPTHER

## 2022-03-02 NOTE — PROGRESS NOTES
Subjective   Federico Angeles is a 66 y.o. male here today for elevated BP.      History of Present Illness   He has been having elevated BP during PT and visits for his shoulder  HE has been taking this at home and it is elevated    The following portions of the patient's history were reviewed and updated as appropriate: allergies, current medications, past medical history, past social history and problem list.    Review of Systems    Objective   Physical Exam  Vitals reviewed.   Constitutional:       Appearance: He is well-developed.   HENT:      Head: Normocephalic and atraumatic.      Right Ear: External ear normal.      Left Ear: External ear normal.   Eyes:      Conjunctiva/sclera: Conjunctivae normal.      Pupils: Pupils are equal, round, and reactive to light.   Neck:      Thyroid: No thyromegaly.      Trachea: No tracheal deviation.   Cardiovascular:      Rate and Rhythm: Normal rate and regular rhythm.      Heart sounds: Normal heart sounds.   Pulmonary:      Effort: Pulmonary effort is normal.      Breath sounds: Normal breath sounds.   Abdominal:      General: Bowel sounds are normal. There is no distension.      Palpations: Abdomen is soft.      Tenderness: There is no abdominal tenderness.   Musculoskeletal:         General: No deformity. Normal range of motion.      Cervical back: Normal range of motion.   Skin:     General: Skin is warm and dry.   Neurological:      Mental Status: He is alert and oriented to person, place, and time.   Psychiatric:         Behavior: Behavior normal.         Thought Content: Thought content normal.         Judgment: Judgment normal.         Vitals:    03/02/22 0928   BP: 128/90   Pulse: 86   Temp: 97.7 °F (36.5 °C)   SpO2: 100%     Body mass index is 29.28 kg/m².       Current Outpatient Medications:   •  cetirizine (zyrTEC) 10 MG tablet, Take 10 mg by mouth Daily., Disp: , Rfl:   •  cholecalciferol (VITAMIN D3) 25 MCG (1000 UT) tablet, Take 1,000 Units by mouth Daily.,  Disp: , Rfl:   •  docusate sodium (COLACE) 100 MG capsule, Take 1 capsule by mouth 2 (Two) Times a Day., Disp: 60 capsule, Rfl: 0  •  Magnesium 400 MG tablet, Take 1 tablet by mouth Daily., Disp: , Rfl:   •  Misc Natural Products (Saw Palmetto Plus) capsule, Take 1 capsule by mouth Daily., Disp: , Rfl:   •  ondansetron (Zofran) 4 MG tablet, Take 1 tablet by mouth Every 8 (Eight) Hours As Needed for Nausea or Vomiting., Disp: 30 tablet, Rfl: 0  •  oxyCODONE-acetaminophen (PERCOCET) 7.5-325 MG per tablet, Take 1 tab po Q 4-6 hours prn pain.  Not to exceed 6 tabs per day., Disp: 42 tablet, Rfl: 0  •  sildenafil (Viagra) 50 MG tablet, Take 50 mg by mouth Daily As Needed for Erectile Dysfunction., Disp: , Rfl:   •  metoprolol succinate XL (Toprol XL) 25 MG 24 hr tablet, Take 1 tablet by mouth Daily., Disp: 30 tablet, Rfl: 2      Assessment/Plan   Diagnoses and all orders for this visit:    1. Hypertension, unspecified type (Primary)    2. Acute pain of right shoulder    Other orders  -     metoprolol succinate XL (Toprol XL) 25 MG 24 hr tablet; Take 1 tablet by mouth Daily.  Dispense: 30 tablet; Refill: 2        1.  HTN-  We will try metoprolol xl 25mg also rec decreasing the salt intake  2.  Insomnia- try deep sleep limit caffeine

## 2022-03-09 ENCOUNTER — OFFICE VISIT (OUTPATIENT)
Dept: ORTHOPEDIC SURGERY | Facility: CLINIC | Age: 67
End: 2022-03-09

## 2022-03-09 VITALS — HEIGHT: 73 IN | WEIGHT: 212.1 LBS | TEMPERATURE: 97.3 F | BODY MASS INDEX: 28.11 KG/M2

## 2022-03-09 DIAGNOSIS — Z09 SURGERY FOLLOW-UP: Primary | ICD-10-CM

## 2022-03-09 PROCEDURE — 99024 POSTOP FOLLOW-UP VISIT: CPT | Performed by: ORTHOPAEDIC SURGERY

## 2022-03-09 NOTE — PROGRESS NOTES
Federico Angeles : 1955 MRN: [id number] DATE: 3/9/2022    CC: 4 weeks s/p right shoulder rotator cuff repair, biceps tenodesis, DCE    HPI: Pt. returns to clinic today for follow up.  Reports pain is well controlled.  Reports compliance with use of the sling and physical therapy.  Denies any new issues or problems.    Vitals:    22 0836   Temp: 97.3 °F (36.3 °C)       Exam:  Wounds appear well-healed.  Arm and forearm soft.  Shoulder moves fluidly and motion is on track per protocol.  Good motor and sensory function distally.  Palpable radial pulse with good cap refill.      Impression:  4 weeks s/p right shoulder rotator cuff repair, biceps tenodesis, DCE    Plan:    1.  Continue PT per protocol.  2.  Continue the sling for 2 more weeks  3.  Counseled the patient about appropriate activity modifications and restrictions.      Jackson Moralez MD

## 2022-03-22 ENCOUNTER — OFFICE VISIT (OUTPATIENT)
Dept: ORTHOPEDIC SURGERY | Facility: CLINIC | Age: 67
End: 2022-03-22

## 2022-03-22 VITALS — HEIGHT: 73 IN | BODY MASS INDEX: 27.83 KG/M2 | WEIGHT: 210 LBS | TEMPERATURE: 97.8 F

## 2022-03-22 DIAGNOSIS — M54.50 LUMBAR BACK PAIN: Primary | ICD-10-CM

## 2022-03-22 PROCEDURE — 99213 OFFICE O/P EST LOW 20 MIN: CPT | Performed by: ORTHOPAEDIC SURGERY

## 2022-03-22 NOTE — PROGRESS NOTES
Increasing back pain as he is undergoing physical therapy for shoulder surgery performed February 8 by Dr. Moralez.  Was a rather extensive surgery requiring rotator cuff repair labral repair etc.  He has been getting active therapy this week and I am thinking that that may make it a little easier on the back since he will be splinting so much or bending over doing circumduction motion etc.  Good strength in the legs.  Reviewed prior x-rays degenerative changes for which she is not going back to work.  I am going to see him in 6 months and he will be undergoing strengthening starting this week.  Lets get a new lumbar x-ray on return visit.

## 2022-03-25 DIAGNOSIS — Z09 SURGERY FOLLOW-UP: ICD-10-CM

## 2022-03-28 ENCOUNTER — OFFICE VISIT (OUTPATIENT)
Dept: ORTHOPEDIC SURGERY | Facility: CLINIC | Age: 67
End: 2022-03-28

## 2022-03-28 VITALS — HEIGHT: 73 IN | TEMPERATURE: 97.1 F | BODY MASS INDEX: 27.83 KG/M2 | WEIGHT: 210 LBS

## 2022-03-28 DIAGNOSIS — Z09 SURGERY FOLLOW-UP: Primary | ICD-10-CM

## 2022-03-28 DIAGNOSIS — M25.512 CHRONIC LEFT SHOULDER PAIN: ICD-10-CM

## 2022-03-28 DIAGNOSIS — G89.29 CHRONIC LEFT SHOULDER PAIN: ICD-10-CM

## 2022-03-28 PROCEDURE — 20610 DRAIN/INJ JOINT/BURSA W/O US: CPT | Performed by: ORTHOPAEDIC SURGERY

## 2022-03-28 PROCEDURE — 99213 OFFICE O/P EST LOW 20 MIN: CPT | Performed by: ORTHOPAEDIC SURGERY

## 2022-03-28 PROCEDURE — 99024 POSTOP FOLLOW-UP VISIT: CPT | Performed by: ORTHOPAEDIC SURGERY

## 2022-03-28 RX ORDER — OXYCODONE HYDROCHLORIDE AND ACETAMINOPHEN 5; 325 MG/1; MG/1
1 TABLET ORAL EVERY 8 HOURS PRN
Qty: 30 TABLET | Refills: 0 | Status: SHIPPED | OUTPATIENT
Start: 2022-03-28 | End: 2022-05-25

## 2022-03-28 RX ORDER — OXYCODONE AND ACETAMINOPHEN 7.5; 325 MG/1; MG/1
TABLET ORAL
Qty: 42 TABLET | Refills: 0 | OUTPATIENT
Start: 2022-03-28

## 2022-03-28 RX ORDER — METHYLPREDNISOLONE ACETATE 80 MG/ML
80 INJECTION, SUSPENSION INTRA-ARTICULAR; INTRALESIONAL; INTRAMUSCULAR; SOFT TISSUE
Status: COMPLETED | OUTPATIENT
Start: 2022-03-28 | End: 2022-03-28

## 2022-03-28 RX ADMIN — METHYLPREDNISOLONE ACETATE 80 MG: 80 INJECTION, SUSPENSION INTRA-ARTICULAR; INTRALESIONAL; INTRAMUSCULAR; SOFT TISSUE at 08:40

## 2022-03-28 NOTE — PROGRESS NOTES
Chief complaint: Follow-up status post right rotator cuff repair; follow-up left shoulder pain    Mr. Angeles is now 7 weeks out from surgery for his right shoulder.  No new issues.  PT is going well.  He does mention that his left shoulder is hurting him.  I saw him for this issue over a year ago and we did an injection.  It helped tremendously.  His pain has slowly gotten worse now that he is dependent on his left arm.  He has asked me to take a look at that today.    Right shoulder: Incisions are healed.  Motion:  , ER 50, IR to L2.    Left shoulder: Skin is benign.  No effusion.  Mild tenderness over the upper biceps.  Full motion.  Positive impingement maneuvers.  He has discomfort and subtle weakness with forward elevation in the scapular plane.    Assessment:  1.  7 weeks s/p right RCR, biceps tenodesis, DCE 2.  Left shoulder pain, suspected bursitis, small or partial-thickness rotator cuff tear and biceps tendinitis    Plan:  Continue PT per protocol.  I agreed to give him one last refill of the oxycodone but I'm weaning him down to the 5s and I want him to come off the after this Rx.    We talked about his left shoulder in some detail.  We need to get his right shoulder a little further along until we consider working up the left.  He would like to try another injection.  The risk, benefits and alternatives were discussed.  He consented and the injection was performed as described below.      Large Joint Arthrocentesis: L subacromial bursa  Date/Time: 3/28/2022 8:40 AM  Consent given by: patient  Site marked: site marked  Timeout: Immediately prior to procedure a time out was called to verify the correct patient, procedure, equipment, support staff and site/side marked as required   Procedure Details  Location: shoulder - L subacromial bursa  Preparation: Patient was prepped and draped in the usual sterile fashion  Needle gauge: 21.  Approach: posterior  Medications administered: 2 mL lidocaine  (cardiac); 80 mg methylPREDNISolone acetate 80 MG/ML  Patient tolerance: patient tolerated the procedure well with no immediate complications

## 2022-03-30 RX ORDER — METOPROLOL SUCCINATE 25 MG/1
25 TABLET, EXTENDED RELEASE ORAL DAILY
Qty: 90 TABLET | Refills: 1 | Status: SHIPPED | OUTPATIENT
Start: 2022-03-30 | End: 2022-12-13 | Stop reason: SDUPTHER

## 2022-05-09 ENCOUNTER — OFFICE VISIT (OUTPATIENT)
Dept: ORTHOPEDIC SURGERY | Facility: CLINIC | Age: 67
End: 2022-05-09

## 2022-05-09 VITALS — BODY MASS INDEX: 27.85 KG/M2 | WEIGHT: 210.1 LBS | HEIGHT: 73 IN | TEMPERATURE: 97.1 F

## 2022-05-09 DIAGNOSIS — Z09 SURGERY FOLLOW-UP: Primary | ICD-10-CM

## 2022-05-09 PROCEDURE — 99024 POSTOP FOLLOW-UP VISIT: CPT | Performed by: ORTHOPAEDIC SURGERY

## 2022-05-09 NOTE — PROGRESS NOTES
Federico Angeles : 1955 MRN: [id number] DATE: 2022     CC: 3 months s/p right shoulder arthroscopy with rotator cuff repair, biceps tenodesis, DCE    HPI: Pt. returns to clinic today for follow up.  Patient reports significant improvement since last visit.  Motion is steadily getting better with therapy.  Pain is fairly minimal at this point.  No complaints.    Vitals:    22 1120   Temp: 97.1 °F (36.2 °C)       Exam:  Wounds appear well-healed.  Arm and forearm soft.  Shoulder motion is improved: patient is still lacking several levels of internal rotation but elevation, abduction and external rotation are all within 5 to 10 degrees of the contralateral side.  Good motor and sensory function in the hand and wrist.  Palpable radial pulse with good cap refill.      Impression: 3-months s/p right shoulder rotator cuff repair, biceps tenodesis, DCE    Plan:    1.  Continue PT per protocol.  2.  Can begin strengthening program at this time  3.  Follow up in 6 weeks for reevaluation  4.  Counseled the patient about appropriate activity modifications and restrictions.    Jackson Moralez MD    2022

## 2022-05-16 DIAGNOSIS — Z12.5 SCREENING FOR PROSTATE CANCER: ICD-10-CM

## 2022-05-16 DIAGNOSIS — Z00.00 HEALTH CARE MAINTENANCE: Primary | ICD-10-CM

## 2022-05-16 RX ORDER — METOPROLOL SUCCINATE 25 MG/1
25 TABLET, EXTENDED RELEASE ORAL DAILY
Qty: 90 TABLET | Refills: 1 | OUTPATIENT
Start: 2022-05-16

## 2022-05-17 LAB
ALBUMIN SERPL-MCNC: 4.4 G/DL (ref 3.8–4.8)
ALBUMIN/GLOB SERPL: 1.8 {RATIO} (ref 1.2–2.2)
ALP SERPL-CCNC: 88 IU/L (ref 44–121)
ALT SERPL-CCNC: 24 IU/L (ref 0–44)
AST SERPL-CCNC: 22 IU/L (ref 0–40)
BASOPHILS # BLD AUTO: 0.1 X10E3/UL (ref 0–0.2)
BASOPHILS NFR BLD AUTO: 1 %
BILIRUB SERPL-MCNC: 0.5 MG/DL (ref 0–1.2)
BUN SERPL-MCNC: 13 MG/DL (ref 8–27)
BUN/CREAT SERPL: 15 (ref 10–24)
CALCIUM SERPL-MCNC: 9.5 MG/DL (ref 8.6–10.2)
CHLORIDE SERPL-SCNC: 106 MMOL/L (ref 96–106)
CHOLEST SERPL-MCNC: 207 MG/DL (ref 100–199)
CO2 SERPL-SCNC: 23 MMOL/L (ref 20–29)
CREAT SERPL-MCNC: 0.85 MG/DL (ref 0.76–1.27)
EGFRCR SERPLBLD CKD-EPI 2021: 95 ML/MIN/1.73
EOSINOPHIL # BLD AUTO: 0.2 X10E3/UL (ref 0–0.4)
EOSINOPHIL NFR BLD AUTO: 4 %
ERYTHROCYTE [DISTWIDTH] IN BLOOD BY AUTOMATED COUNT: 13 % (ref 11.6–15.4)
GLOBULIN SER CALC-MCNC: 2.4 G/DL (ref 1.5–4.5)
GLUCOSE SERPL-MCNC: 99 MG/DL (ref 65–99)
HCT VFR BLD AUTO: 48.5 % (ref 37.5–51)
HDLC SERPL-MCNC: 55 MG/DL
HGB BLD-MCNC: 16.3 G/DL (ref 13–17.7)
IMM GRANULOCYTES # BLD AUTO: 0.1 X10E3/UL (ref 0–0.1)
IMM GRANULOCYTES NFR BLD AUTO: 1 %
LDLC SERPL CALC-MCNC: 138 MG/DL (ref 0–99)
LDLC/HDLC SERPL: 2.5 RATIO (ref 0–3.6)
LYMPHOCYTES # BLD AUTO: 1.9 X10E3/UL (ref 0.7–3.1)
LYMPHOCYTES NFR BLD AUTO: 31 %
MCH RBC QN AUTO: 30.4 PG (ref 26.6–33)
MCHC RBC AUTO-ENTMCNC: 33.6 G/DL (ref 31.5–35.7)
MCV RBC AUTO: 90 FL (ref 79–97)
MONOCYTES # BLD AUTO: 0.5 X10E3/UL (ref 0.1–0.9)
MONOCYTES NFR BLD AUTO: 8 %
NEUTROPHILS # BLD AUTO: 3.3 X10E3/UL (ref 1.4–7)
NEUTROPHILS NFR BLD AUTO: 55 %
PLATELET # BLD AUTO: 276 X10E3/UL (ref 150–450)
POTASSIUM SERPL-SCNC: 5.1 MMOL/L (ref 3.5–5.2)
PROT SERPL-MCNC: 6.8 G/DL (ref 6–8.5)
PSA SERPL-MCNC: 1 NG/ML (ref 0–4)
RBC # BLD AUTO: 5.37 X10E6/UL (ref 4.14–5.8)
SODIUM SERPL-SCNC: 142 MMOL/L (ref 134–144)
TRIGL SERPL-MCNC: 76 MG/DL (ref 0–149)
TSH SERPL DL<=0.005 MIU/L-ACNC: 1.37 UIU/ML (ref 0.45–4.5)
VLDLC SERPL CALC-MCNC: 14 MG/DL (ref 5–40)
WBC # BLD AUTO: 6 X10E3/UL (ref 3.4–10.8)

## 2022-05-17 RX ORDER — METOPROLOL SUCCINATE 25 MG/1
25 TABLET, EXTENDED RELEASE ORAL DAILY
Qty: 90 TABLET | Refills: 1 | OUTPATIENT
Start: 2022-05-17

## 2022-05-20 NOTE — PROGRESS NOTES
The patient has a pain history of the following:  Lumbar back pain    Previous interventions that the patient has received include:   Left subacromial bursa injection 3/28/22  Facet injections - no benefit   Trochanteric bursa injections - no benefit   Epidural steroid injection - no benefit     Pain medications include:  Oxycodone-acetaminophen 5-325mg    Previously: Oxycodone-acetaminophen 7.5-325mg, Tramadol (no benefit), PO steroids (no benefit), Hydrocodone (eases the pain), Celebrex, Gabapentin, Ibuprofen, Meloxicam    Other conservative modalities which the patient reports using include:  Physical Therapy: yes - no benefit   Chiropractor: yes  TENS: yes  Neck or back surgery: no  Past pain management: yes  Ice    Past Significant Surgical History:  Shoulder surgery     HPI:       CHIEF COMPLAINT: Back Pain    Federico Angeles is a 66 y.o. male referred here by Mj Shukla MD. Federico Angeles presents to the office for evaluation and treatment of Back Pain      Onset:  Many years ago   Inciting Event:  UPS   Location:  Low back  Pain: Pain described as ache, throbbing, or like a muscle spasm. Located in the low back and does radiate into the left hip area.  Severity:  Pain rated as a 5 /10.  Apportions pain as 100%  back pain and 0% extremity pain.  Symptoms have been constant.  Exacerbation:  Getting out of bed in the morning (lots of stiffness), moving after being still for prolonged amounts of time, lifting heavy objects.   Alleviation:  Opioids, steroids PO.  Associated Symptoms:   He denies any new onset of bowel or bladder weakness, significant leg weakness or saddle anesthesia. Denies balance problems or lower extremity incoordination.  Ambulates: Without assistive device   Limitations: This pain limits the patient from doing household activities    In February he had some relief after having his shoulder operation because he was prescribed Percocet 7.5-325.      He states that he's  been through all the treatments recommended for his back and that nothing worked.  He's tried multiple medications without any benefit and his only relief has been with Percocet 7.5-325mg.  He states he doesn't want to take this medication 3-4 times a day, but his pain is worst in the mornings and before bed.  He would like to take it during those times.         PEG Assessment   What number best describes your pain on average in the past week?7  What number best describes how, during the past week, pain has interfered with your enjoyment of life?6  What number best describes how, during the past week, pain has interfered with your general activity?  6        Current Outpatient Medications:   •  cetirizine (zyrTEC) 10 MG tablet, Take 10 mg by mouth Daily., Disp: , Rfl:   •  cholecalciferol (VITAMIN D3) 25 MCG (1000 UT) tablet, Take 1,000 Units by mouth Daily., Disp: , Rfl:   •  Magnesium 400 MG tablet, Take 1 tablet by mouth Daily., Disp: , Rfl:   •  metoprolol succinate XL (Toprol XL) 25 MG 24 hr tablet, Take 1 tablet by mouth Daily., Disp: 90 tablet, Rfl: 1  •  Misc Natural Products (Saw Palmetto Plus) capsule, Take 1 capsule by mouth Daily., Disp: , Rfl:   •  sildenafil (VIAGRA) 50 MG tablet, Take 50 mg by mouth Daily As Needed for Erectile Dysfunction., Disp: , Rfl:   •  oxyCODONE-acetaminophen (PERCOCET) 5-325 MG per tablet, Take 1 tablet by mouth Every 8 (Eight) Hours As Needed for Moderate Pain ., Disp: 30 tablet, Rfl: 0    The following portions of the patient's history were reviewed and updated as appropriate: allergies, current medications, past family history, past medical history, past social history, past surgical history and problem list.      REVIEW OF PERTINENT MEDICAL DATA          5/16/22 Creatinine 0.85, Platelets 276 (10*3)    Review of Systems   Constitutional: Positive for activity change (decreased). Negative for chills, fatigue and fever.   HENT: Negative for congestion.    Eyes: Negative for  "visual disturbance.   Respiratory: Negative for chest tightness and shortness of breath.    Cardiovascular: Negative for chest pain.   Gastrointestinal: Negative for abdominal pain, constipation and diarrhea.   Genitourinary: Negative for difficulty urinating and dysuria.   Musculoskeletal: Positive for back pain.   Neurological: Negative for dizziness, weakness, light-headedness, numbness and headaches.   Psychiatric/Behavioral: Positive for sleep disturbance. Negative for agitation. The patient is not nervous/anxious.      I have reviewed and confirmed the accuracy of the ROS as documented by the MA/LPN/RN Karolina Corbin MD      Vitals:    05/24/22 0949   BP: 144/95   Pulse: 69   Temp: 97.1 °F (36.2 °C)   SpO2: 98%   Weight: 95.7 kg (211 lb)   Height: 185.4 cm (73\")   PainSc:   5   PainLoc: Back         Objective   Physical Exam  Vitals reviewed.   Constitutional:       General: He is not in acute distress.  Cardiovascular:      Pulses: Normal pulses.   Pulmonary:      Effort: Pulmonary effort is normal. No respiratory distress.   Musculoskeletal:      Comments: Ambulation: Without assistive device   Lumbar Exam:  Appearance: Scoliotic curve absent and scarring absent  Palpated over lumbosacral paravertebral regions and transverse processes with positive tenderness appreciated, Bilateral.   Sacroiliac joints are not tender, Bilateral.  Trochanteric bursa are not tender, Bilateral.  Facet loading is positive for pain, Bilateral.    Skin:     General: Skin is warm and dry.   Neurological:      General: No focal deficit present.      Mental Status: He is alert.   Psychiatric:         Mood and Affect: Mood normal.         Thought Content: Thought content normal.          Assessment & Plan   Diagnoses and all orders for this visit:    1. 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 Chronic pain syndrome    3. Degeneration of lumbar or lumbosacral intervertebral disc    4. " Displacement of lumbar intervertebral disc without myelopathy        - Pertinent labs reviewed.   - Pertinent imaging reviewed.    - Release of information obtained for previous pain clinic.   - Explained that I will review his previous pain clinic treatment prior to deciding our plan of action.   - I have asked him to call the office ~1 week prior to returning to make sure we have these records.   - With his pain complaints and pathology on imaging, it sounds as if his lumbar facet joints are his main issues.  I recommend lumbar Medial branch blocks and Radiofrequency ablation (thermal, non-pulsed).  He states he has had these before without benefit and that I should discuss this with Dr. Shukla.  If Dr. Shukla agrees, then he may consider repeating the injections.   - Discussed that typically I would not recommend opioids for management of his current issues.  I do not guarantee a prescription after reviewing his pain records.  He understands.   - Federico Angeles reports a pain score of 5.  Given his pain assessment as noted, treatment options were discussed and the following options were decided upon as a follow-up plan to address the patient's pain: obtain previous records.    --- Follow-up 3-4 weeks after records are obtained.            KRISTAL REPORT    As the clinician, I personally reviewed the KRISTAL from 5/24/22 while the patient was in the office today.    While examining this patient, I wore protective equipment including a mask, eye shield and gloves.  I washed my hands before and after this patient encounter.  The patient wore a mask throughout the visit as well.     Karolina Corbin MD  Pain Management

## 2022-05-24 ENCOUNTER — OFFICE VISIT (OUTPATIENT)
Dept: PAIN MEDICINE | Facility: CLINIC | Age: 67
End: 2022-05-24

## 2022-05-24 VITALS
HEART RATE: 69 BPM | DIASTOLIC BLOOD PRESSURE: 95 MMHG | WEIGHT: 211 LBS | HEIGHT: 73 IN | OXYGEN SATURATION: 98 % | BODY MASS INDEX: 27.96 KG/M2 | TEMPERATURE: 97.1 F | SYSTOLIC BLOOD PRESSURE: 144 MMHG

## 2022-05-24 DIAGNOSIS — M51.37 DEGENERATION OF LUMBAR OR LUMBOSACRAL INTERVERTEBRAL DISC: ICD-10-CM

## 2022-05-24 DIAGNOSIS — G89.4 CHRONIC PAIN SYNDROME: ICD-10-CM

## 2022-05-24 DIAGNOSIS — M51.26 DISPLACEMENT OF LUMBAR INTERVERTEBRAL DISC WITHOUT MYELOPATHY: ICD-10-CM

## 2022-05-24 DIAGNOSIS — M47.816 SPONDYLOSIS OF LUMBAR REGION WITHOUT MYELOPATHY OR RADICULOPATHY: Primary | ICD-10-CM

## 2022-05-24 PROCEDURE — 99204 OFFICE O/P NEW MOD 45 MIN: CPT | Performed by: ANESTHESIOLOGY

## 2022-05-25 ENCOUNTER — OFFICE VISIT (OUTPATIENT)
Dept: INTERNAL MEDICINE | Facility: CLINIC | Age: 67
End: 2022-05-25

## 2022-05-25 VITALS
TEMPERATURE: 97.7 F | BODY MASS INDEX: 28.71 KG/M2 | WEIGHT: 216.6 LBS | OXYGEN SATURATION: 99 % | DIASTOLIC BLOOD PRESSURE: 86 MMHG | SYSTOLIC BLOOD PRESSURE: 124 MMHG | HEIGHT: 73 IN | HEART RATE: 63 BPM

## 2022-05-25 DIAGNOSIS — E78.5 HYPERLIPIDEMIA, UNSPECIFIED HYPERLIPIDEMIA TYPE: ICD-10-CM

## 2022-05-25 DIAGNOSIS — I10 HYPERTENSION, UNSPECIFIED TYPE: Primary | ICD-10-CM

## 2022-05-25 PROCEDURE — 99397 PER PM REEVAL EST PAT 65+ YR: CPT | Performed by: INTERNAL MEDICINE

## 2022-05-25 NOTE — PATIENT INSTRUCTIONS
"Fat and Cholesterol Restricted Eating Plan  Getting too much fat and cholesterol in your diet may cause health problems. Choosing the right foods helps keep your fat and cholesterol at normal levels. This can keep you from getting certain diseases.  Your doctor may recommend an eating plan that includes:  Total fat: ______% or less of total calories a day.  Saturated fat: ______% or less of total calories a day.  Cholesterol: less than _________mg a day.  Fiber: ______g a day.  What are tips for following this plan?  Meal planning  At meals, divide your plate into four equal parts:  Fill one-half of your plate with vegetables and green salads.  Fill one-fourth of your plate with whole grains.  Fill one-fourth of your plate with low-fat (lean) protein foods.  Eat fish that is high in omega-3 fats at least two times a week. This includes mackerel, tuna, sardines, and salmon.  Eat foods that are high in fiber, such as whole grains, beans, apples, broccoli, carrots, peas, and barley.  General tips    Work with your doctor to lose weight if you need to.  Avoid:  Foods with added sugar.  Fried foods.  Foods with partially hydrogenated oils.  Limit alcohol intake to no more than 1 drink a day for nonpregnant women and 2 drinks a day for men. One drink equals 12 oz of beer, 5 oz of wine, or 1½ oz of hard liquor.    Reading food labels  Check food labels for:  Trans fats.  Partially hydrogenated oils.  Saturated fat (g) in each serving.  Cholesterol (mg) in each serving.  Fiber (g) in each serving.  Choose foods with healthy fats, such as:  Monounsaturated fats.  Polyunsaturated fats.  Omega-3 fats.  Choose grain products that have whole grains. Look for the word \"whole\" as the first word in the ingredient list.  Cooking  Cook foods using low-fat methods. These include baking, boiling, grilling, and broiling.  Eat more home-cooked foods. Eat at restaurants and buffets less often.  Avoid cooking using saturated fats, such as " butter, cream, palm oil, palm kernel oil, and coconut oil.  Recommended foods    Fruits  All fresh, canned (in natural juice), or frozen fruits.  Vegetables  Fresh or frozen vegetables (raw, steamed, roasted, or grilled). Green salads.  Grains  Whole grains, such as whole wheat or whole grain breads, crackers, cereals, and pasta. Unsweetened oatmeal, bulgur, barley, quinoa, or brown rice. Corn or whole wheat flour tortillas.  Meats and other protein foods  Ground beef (85% or leaner), grass-fed beef, or beef trimmed of fat. Skinless chicken or turkey. Ground chicken or turkey. Pork trimmed of fat. All fish and seafood. Egg whites. Dried beans, peas, or lentils. Unsalted nuts or seeds. Unsalted canned beans. Nut butters without added sugar or oil.  Dairy  Low-fat or nonfat dairy products, such as skim or 1% milk, 2% or reduced-fat cheeses, low-fat and fat-free ricotta or cottage cheese, or plain low-fat and nonfat yogurt.  Fats and oils  Tub margarine without trans fats. Light or reduced-fat mayonnaise and salad dressings. Avocado. Olive, canola, sesame, or safflower oils.  The items listed above may not be a complete list of foods and beverages you can eat. Contact a dietitian for more information.  Foods to avoid  Fruits  Canned fruit in heavy syrup. Fruit in cream or butter sauce. Fried fruit.  Vegetables  Vegetables cooked in cheese, cream, or butter sauce. Fried vegetables.  Grains  White bread. White pasta. White rice. Cornbread. Bagels, pastries, and croissants. Crackers and snack foods that contain trans fat and hydrogenated oils.  Meats and other protein foods  Fatty cuts of meat. Ribs, chicken wings, tello, sausage, bologna, salami, chitterlings, fatback, hot dogs, bratwurst, and packaged lunch meats. Liver and organ meats. Whole eggs and egg yolks. Chicken and turkey with skin. Fried meat.  Dairy  Whole or 2% milk, cream, half-and-half, and cream cheese. Whole milk cheeses. Whole-fat or sweetened yogurt.  Full-fat cheeses. Nondairy creamers and whipped toppings. Processed cheese, cheese spreads, and cheese curds.  Beverages  Alcohol. Sugar-sweetened drinks such as sodas, lemonade, and fruit drinks.  Fats and oils  Butter, stick margarine, lard, shortening, ghee, or tello fat. Coconut, palm kernel, and palm oils.  Sweets and desserts  Corn syrup, sugars, honey, and molasses. Candy. Jam and jelly. Syrup. Sweetened cereals. Cookies, pies, cakes, donuts, muffins, and ice cream.  The items listed above may not be a complete list of foods and beverages you should avoid. Contact a dietitian for more information.  Summary  Choosing the right foods helps keep your fat and cholesterol at normal levels. This can keep you from getting certain diseases.  At meals, fill one-half of your plate with vegetables and green salads.  Eat high-fiber foods, like whole grains, beans, apples, carrots, peas, and barley.  Limit added sugar, saturated fats, alcohol, and fried foods.  This information is not intended to replace advice given to you by your health care provider. Make sure you discuss any questions you have with your health care provider.  Document Revised: 04/21/2021 Document Reviewed: 04/21/2021  [employer]vier Patient Education © 2021 [employer]

## 2022-05-25 NOTE — PROGRESS NOTES
"Subjective   Federico Angeles is a 66 y.o. male and is here for a comprehensive physical exam. The patient reports problems - htn.    Pt has been taking BP meds as prescribed without any problems.  No HA  No episodes of orthostasis  He has right shoulder sx in feb  He has done well  He does have lbp and is seeing pain manaemeng  He does struggle with sleep  He has tried sleep 3 otc and does seem to help  Trazodone did not help    Do you take any herbs or supplements that were not prescribed by a doctor?       Social History:no tob no etoh   Social History     Socioeconomic History   • Marital status:      Spouse name: PAT ANGELES   • Number of children: 2   Tobacco Use   • Smoking status: Never Smoker   • Smokeless tobacco: Never Used   • Tobacco comment: Only as teen   Vaping Use   • Vaping Use: Never used   Substance and Sexual Activity   • Alcohol use: No   • Drug use: No   • Sexual activity: Yes     Partners: Female     Birth control/protection: None       Family History:   Family History   Problem Relation Age of Onset   • Heart attack Father 55        drinker   • Liver disease Father    • Alcohol abuse Father    • No Known Problems Sister    • No Known Problems Brother    • Malig Hyperthermia Neg Hx        Past Medical History:   Past Medical History:   Diagnosis Date   • Generalized headaches    • Insomnia    • Leg cramps    • Low back pain 2016    Lower back pain for years   • PONV (postoperative nausea and vomiting)    • Tinnitus            Review of Systems    A comprehensive review of systems was negative.    Objective   /86 (BP Location: Left arm, Patient Position: Sitting)   Pulse 63   Temp 97.7 °F (36.5 °C) (Infrared)   Ht 185.4 cm (73\")   Wt 98.2 kg (216 lb 9.6 oz)   SpO2 99%   BMI 28.58 kg/m²     General Appearance:    Alert, cooperative, no distress, appears stated age   Head:    Normocephalic, without obvious abnormality, atraumatic   Eyes:    PERRL, conjunctiva/corneas clear, " EOM's intact, fundi     benign, both eyes        Ears:    Normal TM's and external ear canals, both ears   Nose:   Nares normal, septum midline, mucosa normal, no drainage    or sinus tenderness   Throat:   Lips, mucosa, and tongue normal; teeth and gums normal   Neck:   Supple, symmetrical, trachea midline, no adenopathy;        thyroid:  No enlargement/tenderness/nodules; no carotid    bruit or JVD   Back:     Symmetric, no curvature, ROM normal, no CVA tenderness   Lungs:     Clear to auscultation bilaterally, respirations unlabored   Chest wall:    No tenderness or deformity   Heart:    Regular rate and rhythm, S1 and S2 normal, no murmur, rub   or gallop   Abdomen:     Soft, non-tender, bowel sounds active all four quadrants,     no masses, no organomegaly           Extremities:   Extremities normal, atraumatic, no cyanosis or edema   Pulses:   2+ and symmetric all extremities   Skin:   Skin color, texture, turgor normal, no rashes or lesions   Lymph nodes:   Cervical, supraclavicular, and axillary nodes normal   Neurologic:   CNII-XII intact. Normal strength, sensation and reflexes       throughout       Vitals:    05/25/22 1017   BP: 124/86   Pulse: 63   Temp: 97.7 °F (36.5 °C)   SpO2: 99%     Body mass index is 28.58 kg/m².      Medications:   Current Outpatient Medications:   •  cetirizine (zyrTEC) 10 MG tablet, Take 10 mg by mouth Daily., Disp: , Rfl:   •  cholecalciferol (VITAMIN D3) 25 MCG (1000 UT) tablet, Take 1,000 Units by mouth Daily., Disp: , Rfl:   •  Magnesium 400 MG tablet, Take 1 tablet by mouth Daily., Disp: , Rfl:   •  metoprolol succinate XL (Toprol XL) 25 MG 24 hr tablet, Take 1 tablet by mouth Daily., Disp: 90 tablet, Rfl: 1  •  Misc Natural Products (Saw Palmetto Plus) capsule, Take 1 capsule by mouth Daily., Disp: , Rfl:   •  sildenafil (VIAGRA) 50 MG tablet, Take 50 mg by mouth Daily As Needed for Erectile Dysfunction., Disp: , Rfl:        Assessment & Plan   Healthy male exam.      1.  Healthcare Maintenance:  2. Patient Counseling:  --Nutrition: Stressed importance of moderation in sodium/caffeine intake, saturated fat and cholesterol, caloric balance, sufficient intake of fresh fruits, vegetables, fiber, calcium and vit D  --Exercise: he does exercise reg  --Substance Abuse: no tob no etoh  --Dental health: he does go to the dentist reg  --Immunizations reviewed.  --Discussed benefits of screening colonoscopy.  3.  HTN- ok with toprol  4. Chronic LBP-  Seeing pain management    5. HPL-  He has been eating out a lot

## 2022-06-27 ENCOUNTER — OFFICE VISIT (OUTPATIENT)
Dept: ORTHOPEDIC SURGERY | Facility: CLINIC | Age: 67
End: 2022-06-27

## 2022-06-27 VITALS — HEIGHT: 73 IN | RESPIRATION RATE: 18 BRPM | TEMPERATURE: 97.5 F | BODY MASS INDEX: 29.03 KG/M2 | WEIGHT: 219 LBS

## 2022-06-27 DIAGNOSIS — Z09 SURGERY FOLLOW-UP: Primary | ICD-10-CM

## 2022-06-27 PROCEDURE — 99212 OFFICE O/P EST SF 10 MIN: CPT | Performed by: ORTHOPAEDIC SURGERY

## 2022-06-27 RX ORDER — HYDROCODONE BITARTRATE AND ACETAMINOPHEN 7.5; 325 MG/1; MG/1
1 TABLET ORAL EVERY 6 HOURS PRN
COMMUNITY
Start: 2022-05-31 | End: 2022-12-27 | Stop reason: HOSPADM

## 2022-06-27 NOTE — PROGRESS NOTES
Federico Angeles : 1955 MRN: [id number] DATE: 2022     CC: 4.5 months s/p right shoulder rotator cuff repair    HPI: Pt. returns to clinic today for follow up of the shoulder.  He denies any concerns or problems.  He says the shoulder is great.  He has been released from PT.    Vitals:    22 1038   Resp: 18   Temp: 97.5 °F (36.4 °C)       Exam:  Wounds are well-healed.  Arm and forearm soft.  Shoulder motion is nearly symmetric to the contralateral side.  He remains weak with elevation and abduction.  No significant pain on exam.  Good motor and sensory function in the hand and wrist.  Palpable radial pulse with good cap refill.      Impression:  4.5 months s/p right shoulder rotator cuff repair, biceps tenodesis, DCE    Plan:    1.  Continue PT per protocol.  2.  Continue strengthening program   3.  Follow up as needed at this point    Jackson Moralez MD    2022

## 2022-07-15 ENCOUNTER — TELEPHONE (OUTPATIENT)
Dept: ORTHOPEDIC SURGERY | Facility: CLINIC | Age: 67
End: 2022-07-15

## 2022-07-15 NOTE — TELEPHONE ENCOUNTER
Caller: LONNIE SPICER    Relationship to patient: [employer]     Best call back number: [phone]    Patient is needing: CALLING TO SEE IF THE OFFICE RECEIVED REQUEST THAT WAS SENT ON 7-5-22. IF SO PLEASE RESPOND BY FAX [phone] OR CALL IF YOU HAVE NOT RECEIVED THE REQUEST. ATTMEPTED TO [facility] BUT THERE WAS NO ANSWER.

## 2022-07-20 NOTE — TELEPHONE ENCOUNTER
Spoke with patient and he said [place] was contacted by him and he explained Dr Mcnair is not involved in the claim and no need to do anything with requests for [facility] information.

## 2022-09-20 ENCOUNTER — OFFICE VISIT (OUTPATIENT)
Dept: ORTHOPEDIC SURGERY | Facility: CLINIC | Age: 67
End: 2022-09-20

## 2022-09-20 VITALS — HEIGHT: 73 IN | BODY MASS INDEX: 27.85 KG/M2 | WEIGHT: 210.1 LBS | TEMPERATURE: 97.4 F

## 2022-09-20 DIAGNOSIS — M54.50 CHRONIC BILATERAL LOW BACK PAIN WITHOUT SCIATICA: Primary | ICD-10-CM

## 2022-09-20 DIAGNOSIS — G89.29 CHRONIC BILATERAL LOW BACK PAIN WITHOUT SCIATICA: Primary | ICD-10-CM

## 2022-09-20 PROCEDURE — 99213 OFFICE O/P EST LOW 20 MIN: CPT | Performed by: NURSE PRACTITIONER

## 2022-09-20 PROCEDURE — 72100 X-RAY EXAM L-S SPINE 2/3 VWS: CPT | Performed by: NURSE PRACTITIONER

## 2022-09-20 NOTE — PROGRESS NOTES
Patient Name: Federico Angeles   YOB: 1955  Referring Primary Care Physician: Ying Villatoro MD      Chief Complaint:    Chief Complaint   Patient presents with   • Lumbar Spine - Follow-up, Pain        HPI:  Federico Angeles is a 66 y.o. male who presents to [facility] for follow up of chronic low back pain.  This was a scheduled follow-up, he denies any significant change since last visit.  Pain is still primarily across the low back, worse in the morning and with activity.  He denies any radicular pain in lower extremities.  He has been going to specialists in pain.  He has not getting injections as they lost efficacy over time.  He does utilize hydrocodone he says usually in the morning and in the evening and sometimes as needed during the day.  He reports he had a functional capacity exam recently.    PFSH:  See attached    ROS: As per HPI, otherwise negative    Objective:    Vitals:    09/20/22 0923   Temp: 97.4 °F (36.3 °C)     Body mass index is 27.72 kg/m².      Physical Exam  Vitals reviewed.   Constitutional:       Appearance: He is well-developed and well-nourished.   Eyes:      General: No scleral icterus.  Skin:     General: Skin is intact.   Neurological:      Mental Status: He is alert.      Gait: Gait is intact.   Psychiatric:         Mood and Affect: Mood and affect normal.       Spine Musculoskeletal Exam    General        Constitutional: well-developed and well-nourished    Scleral icterus: no    Labored breathing: no    Psychiatric: normal mood and affect and no acute distress    Neurological: alert and oriented x3    Skin: intact    Gait      Gait is normal.    Palpation      Thoracolumbar      Tenderness: present      Paraspinous: right and left    Range of Motion      Thoracolumbar      Thoracolumbar flexion: 75%    Thoracolumbar extension: 75%    Strength      Thoracolumbar      Thoracolumbar motor exam is normal.    Sensory        Thoracolumbar      Thoracolumbar sensation is normal.    Reflexes        Right Spine        Quadriceps: 2/4      Achilles: 2/4       Left Spine        Quadriceps: 2/4      Achilles: 2/4    Special Tests      Thoracolumbar          Right Thoracolumbar        SLR: no back or leg pain        Left Thoracolumbar        SLR: no back or leg pain        IMAGING:     Indication: pain related symptoms,  Views: 2V AP&LAT lumbar  Findings: Personally reviewed and reveals multilevel degenerative change with disc space narrowing most significant L5-S1 followed by L2-3 where there is also slight retrolisthesis of L2 on L3  Comparison views: No significant change since 7/2/2021    Assessment:           Diagnoses and all orders for this visit:    1. Chronic bilateral low back pain without sciatica (Primary)  Overview:  Chronic pain      Orders:  -     XR Spine Lumbar 2 or 3 View           Plan:  Symptoms are stable since last visit.  Still complaining primarily of low back pain worse in the morning with activity.  Motor and sensation is intact in lower extremities.  He denies any radiculopathy.  It has been well documented that due to the degenerative change he is not likely to ever resume employment as an  per Dr. Shukla's recommendation.  We will have him follow-up in 3 months with Dr. Shukla for 1 last visit before Dr. Shukla's half-way.  Going forward, he may be referred to a physiatrist and if surgical questions arise he will be referred to one of the partners at [facility] neurosurgery.    Return in about 3 months (around 12/20/2022) for final appointment with Dr Shukla.

## 2022-09-28 ENCOUNTER — TELEPHONE (OUTPATIENT)
Dept: INTERNAL MEDICINE | Facility: CLINIC | Age: 67
End: 2022-09-28

## 2022-09-28 RX ORDER — SILDENAFIL 50 MG/1
50 TABLET, FILM COATED ORAL DAILY PRN
Qty: 30 TABLET | Refills: 1 | Status: SHIPPED | OUTPATIENT
Start: 2022-09-28 | End: 2023-03-07

## 2022-09-28 NOTE — TELEPHONE ENCOUNTER
RX SENT TO PHARMACY    ----- Message from Federico Angeles sent at 9/27/2022 10:29 PM EDT -----  Regarding: Sildenafil  Can I get a new prescription for the Sildenafil 50 MG? It says it can't be refilled on My Chart. Haven't had to use it in a while.

## 2022-11-02 ENCOUNTER — CLINICAL SUPPORT (OUTPATIENT)
Dept: ORTHOPEDIC SURGERY | Facility: CLINIC | Age: 67
End: 2022-11-02

## 2022-11-02 VITALS — BODY MASS INDEX: 28.37 KG/M2 | TEMPERATURE: 97.8 F | HEIGHT: 73 IN | WEIGHT: 214.1 LBS

## 2022-11-02 DIAGNOSIS — G89.29 CHRONIC LEFT SHOULDER PAIN: Primary | ICD-10-CM

## 2022-11-02 DIAGNOSIS — M25.512 CHRONIC LEFT SHOULDER PAIN: Primary | ICD-10-CM

## 2022-11-02 PROCEDURE — 73030 X-RAY EXAM OF SHOULDER: CPT | Performed by: ORTHOPAEDIC SURGERY

## 2022-11-02 PROCEDURE — 99213 OFFICE O/P EST LOW 20 MIN: CPT | Performed by: ORTHOPAEDIC SURGERY

## 2022-11-02 NOTE — PROGRESS NOTES
"  Patient: Federico Angeles    YOB: 1955    Medical Record Number: [id number]    Chief Complaints: Follow-up regarding left shoulder pain    History of Present Illness:     66 y.o. male patient who presents for follow-up of the left shoulder.  He reports persistent pain and symptoms.  Conservative treatment has been ineffective thus far.  The last injection did not help as much.  We fixed his right rotator cuff about a months ago.  The right shoulder is now \"perfect\" as he describes it.  He is worried that he may have a tear in the left shoulder.  He is of the mindset that he would want to go ahead and get that addressed if a tear were confirmed.    Allergies: No Known Allergies    Home Medications:    Current Outpatient Medications:   •  cetirizine (zyrTEC) 10 MG tablet, Take 10 mg by mouth Daily., Disp: , Rfl:   •  cholecalciferol (VITAMIN D3) 25 MCG (1000 UT) tablet, Take 1,000 Units by mouth Daily., Disp: , Rfl:   •  Magnesium 400 MG tablet, Take 1 tablet by mouth Daily., Disp: , Rfl:   •  metoprolol succinate XL (Toprol XL) 25 MG 24 hr tablet, Take 1 tablet by mouth Daily., Disp: 90 tablet, Rfl: 1  •  HYDROcodone-acetaminophen (NORCO) 7.5-325 MG per tablet, , Disp: , Rfl:   •  Misc Natural Products (Saw Palmetto Plus) capsule, Take 1 capsule by mouth Daily., Disp: , Rfl:   •  sildenafil (VIAGRA) 50 MG tablet, Take 1 tablet by mouth Daily As Needed for Erectile Dysfunction., Disp: 30 tablet, Rfl: 1    Past Medical History:   Diagnosis Date   • Generalized headaches    • Insomnia    • Leg cramps    • Low back pain 2016    Lower back pain for years   • PONV (postoperative nausea and vomiting)    • Tinnitus        Past Surgical History:   Procedure Laterality Date   • NECK SURGERY  1979 and 1989   • SHOULDER ARTHROSCOPY Right 2/8/2022    Procedure: SHOULDER ARTHROSCOPY , DISTAL CLAVICLE EXCISION,  BICEPS TENODESIS , ROTATATOR CUFF REPAIR;  Surgeon: Jackson Moralez MD;  Location: [facility];  " "Service: Orthopedics;  Laterality: Right;       Social History     Occupational History   • Occupation: air      Employer: [employer]   Tobacco Use   • Smoking status: Never   • Smokeless tobacco: Never   • Tobacco comments:     Only as teen   Vaping Use   • Vaping Use: Never used   Substance and Sexual Activity   • Alcohol use: No   • Drug use: No   • Sexual activity: Yes     Partners: Female     Birth control/protection: None      Social History     Social History Narrative    2 grown children    2 grandchildren       Family History   Problem Relation Age of Onset   • Heart attack Father 55        drinker   • Liver disease Father    • Alcohol abuse Father    • No Known Problems Sister    • No Known Problems Brother    • Malig Hyperthermia Neg Hx        Review of Systems:      Constitutional: Denies fever, shaking or chills   Eyes: Denies change in visual acuity   HEENT: Denies nasal congestion or sore throat   Respiratory: Denies cough or shortness of breath   Cardiovascular: Denies chest pain or edema  Endocrine: Denies tremors, palpitations, intolerance of heat or cold, polyuria, polydipsia.  GI: Denies abdominal pain, nausea, vomiting, bloody stools or diarrhea  : Denies frequency, urgency, incontinence, retention, or nocturia.  Musculoskeletal: Denies numbness, tingling or loss of motor function except as above  Integument: Denies rash, lesion or ulceration   Neurologic: Denies headache or focal weakness, deficits  Heme: Denies spontaneous or excessive bleeding, epistaxis, hematuria, melena, fatigue, enlarged or tender lymph nodes.      All other pertinent positives and negatives as noted above in HPI.    Physical Exam:   66 y.o. male  Vitals:    11/02/22 0909   Temp: 97.8 °F (36.6 °C)   Weight: 97.1 kg (214 lb 1.6 oz)   Height: 185.4 cm (73\")     General:  Patient is awake and alert.  Appears in no acute distress or discomfort.    Psych:  Affect and demeanor are " appropriate.    Cardiovascular:  Regular rate and rhythm.    Lungs:  Good chest expansion.  Breathing unlabored.    Extremities: Left shoulder is examined.  Skin is benign.  Mild tenderness anteriorly over the Tatar interval and upper biceps groove.  Positive speeds.  Positive Tellez.  Positive Bayfield's.  He has 5- out of 5 strength with elevation in the scapular plane.  Normal motor and sensory function in the lower arm and hand.  Palpable radial pulse.  Brisk capillary refill.    Imaging: AP, scapular Y, and axillary views of the left shoulder are ordered by myself and reviewed to evaluate the patient's complaint.  These are compared to previous x-rays.  The x-rays show no obvious acute abnormalities, lesions, masses, significant glenohumeral degenerative changes, or other concerning findings.  The acromiohumeral interval is normal.  Glenoid version appears normal as well.    Assessment/Plan:  Left shoulder pain, suspected rotator cuff tear    We discussed options.  I recommend an MRI at this point to better evaluate the source of his pain.  I told him that I will review the MRI and then call with the results.  We will come up with a plan pending review of that study.    Jackson Moralez MD    11/02/2022

## 2022-11-03 ENCOUNTER — FLU SHOT (OUTPATIENT)
Dept: INTERNAL MEDICINE | Facility: CLINIC | Age: 67
End: 2022-11-03

## 2022-11-03 PROCEDURE — 90471 IMMUNIZATION ADMIN: CPT | Performed by: NURSE PRACTITIONER

## 2022-11-03 PROCEDURE — 90662 IIV NO PRSV INCREASED AG IM: CPT | Performed by: NURSE PRACTITIONER

## 2022-11-09 ENCOUNTER — TELEPHONE (OUTPATIENT)
Dept: ORTHOPEDIC SURGERY | Facility: CLINIC | Age: 67
End: 2022-11-09

## 2022-11-09 ENCOUNTER — PREP FOR SURGERY (OUTPATIENT)
Dept: OTHER | Facility: HOSPITAL | Age: 67
End: 2022-11-09

## 2022-11-09 DIAGNOSIS — G89.29 CHRONIC LEFT SHOULDER PAIN: Primary | ICD-10-CM

## 2022-11-09 DIAGNOSIS — M25.512 CHRONIC LEFT SHOULDER PAIN: Primary | ICD-10-CM

## 2022-11-09 RX ORDER — CEFAZOLIN SODIUM 2 G/100ML
2 INJECTION, SOLUTION INTRAVENOUS ONCE
Status: CANCELLED | OUTPATIENT
Start: 2022-12-27 | End: 2022-11-09

## 2022-11-09 NOTE — TELEPHONE ENCOUNTER
I spoke with him regarding his MRI results.  I explained that he appears to have a high-grade partial rotator cuff tear with a small full-thickness component.  We had a long discussion about options.  He just recently went through the right rotator cuff repair.  He says the right shoulder is now doing great.  He acknowledged understanding of the natural history of rotator cuff tears and his options.  He wants to move forward with getting the surgery scheduled.  I explained that the risk of the same as those we discussed with his previous surgery.  Obviously previous success is not a guarantee of future success and those risk still need to be weighed when considering whether or not to have surgery.  He acknowledged understanding and wants to move forward.  I will have my  contact him.

## 2022-11-10 PROBLEM — M25.512 CHRONIC LEFT SHOULDER PAIN: Status: ACTIVE | Noted: 2022-11-10

## 2022-11-10 PROBLEM — G89.29 CHRONIC LEFT SHOULDER PAIN: Status: ACTIVE | Noted: 2022-11-10

## 2022-11-16 ENCOUNTER — OFFICE VISIT (OUTPATIENT)
Dept: INTERNAL MEDICINE | Facility: CLINIC | Age: 67
End: 2022-11-16

## 2022-11-16 VITALS
BODY MASS INDEX: 28.98 KG/M2 | TEMPERATURE: 97.7 F | WEIGHT: 218.7 LBS | SYSTOLIC BLOOD PRESSURE: 132 MMHG | HEART RATE: 64 BPM | HEIGHT: 73 IN | OXYGEN SATURATION: 99 % | DIASTOLIC BLOOD PRESSURE: 90 MMHG

## 2022-11-16 DIAGNOSIS — K21.9 GASTROESOPHAGEAL REFLUX DISEASE, UNSPECIFIED WHETHER ESOPHAGITIS PRESENT: ICD-10-CM

## 2022-11-16 DIAGNOSIS — J06.9 VIRAL URI WITH COUGH: ICD-10-CM

## 2022-11-16 DIAGNOSIS — U07.1 COVID-19 VIRUS INFECTION: Primary | ICD-10-CM

## 2022-11-16 PROCEDURE — 99213 OFFICE O/P EST LOW 20 MIN: CPT | Performed by: NURSE PRACTITIONER

## 2022-11-16 NOTE — PROGRESS NOTES
Subjective   Federico Angeles is a 66 y.o. male.     Chief Complaint   Patient presents with   • Cough     Tested positive for Covid over 7 days ago, still not feeling better        History of Present Illness   He is here today with c/o URI symptoms. He and his wife tested positive for COVID with symptom onset 7 days ago. His symptoms have improved overall, but he is still with intermittent postnasal drainage and cough.  Cough is worse at bedtime.  Cough is predominantly dry.  He has noticed some stomach upset and frequent belching along with altered sense of taste. He has tried Tums without improvement.   He has been using claritin-D without improvement in drainage. He has had some improvement with Nyquil, but this upsets his stomach.     The following portions of the patient's history were reviewed and updated as appropriate: allergies, current medications, past family history, past medical history, past social history, past surgical history and problem list.    Review of Systems   Constitutional: Positive for appetite change. Negative for chills, fatigue and fever.   HENT: Positive for congestion and postnasal drip. Negative for ear pain, sinus pressure and sore throat.    Respiratory: Positive for cough. Negative for chest tightness, shortness of breath and wheezing.    Cardiovascular: Negative for chest pain, palpitations and leg swelling.   Gastrointestinal: Positive for indigestion. Negative for abdominal distention, abdominal pain, blood in stool, diarrhea, nausea and vomiting.        Belching     Psychiatric/Behavioral: Negative for suicidal ideas and depressed mood. The patient is not nervous/anxious.        Objective   Physical Exam  Constitutional:       Appearance: He is well-developed.   HENT:      Head: Normocephalic and atraumatic.      Right Ear: Hearing, ear canal and external ear normal.      Left Ear: Hearing, ear canal and external ear normal.      Ears:      Comments: We would present bilateral  TM.     Nose: Congestion present.      Right Turbinates: Not enlarged.      Left Turbinates: Not enlarged.      Right Sinus: No maxillary sinus tenderness or frontal sinus tenderness.      Left Sinus: No maxillary sinus tenderness or frontal sinus tenderness.      Mouth/Throat:      Lips: Pink.      Mouth: Mucous membranes are moist. No injury or oral lesions.      Dentition: Normal dentition.      Tongue: No lesions. Tongue does not deviate from midline.      Palate: No mass and lesions.      Pharynx: Uvula midline. No pharyngeal swelling, oropharyngeal exudate, posterior oropharyngeal erythema or uvula swelling.      Comments: Posterior pharynx with clear drainage.  Neck:      Thyroid: No thyroid mass, thyromegaly or thyroid tenderness.      Vascular: No carotid bruit.      Trachea: Trachea normal.   Cardiovascular:      Rate and Rhythm: Normal rate and regular rhythm.      Chest Wall: PMI is not displaced.      Pulses:           Radial pulses are 2+ on the right side and 2+ on the left side.        Dorsalis pedis pulses are 2+ on the right side and 2+ on the left side.        Posterior tibial pulses are 2+ on the right side and 2+ on the left side.      Heart sounds: S1 normal and S2 normal.   Pulmonary:      Effort: Pulmonary effort is normal.      Breath sounds: Normal breath sounds.   Musculoskeletal:      Right lower leg: No edema.      Left lower leg: No edema.   Lymphadenopathy:      Head:      Right side of head: No submental, submandibular, tonsillar or occipital adenopathy.      Left side of head: No submental, submandibular, tonsillar or occipital adenopathy.      Cervical: No cervical adenopathy.   Skin:     General: Skin is warm and dry.      Capillary Refill: Capillary refill takes less than 2 seconds.      Nails: There is no clubbing.   Neurological:      Mental Status: He is alert and oriented to person, place, and time.   Psychiatric:         Attention and Perception: Attention normal.          Mood and Affect: Mood and affect normal.         Speech: Speech normal.         Behavior: Behavior normal.         Thought Content: Thought content normal.         Cognition and Memory: Cognition normal.         Vitals:    11/16/22 1329   BP: 132/90   Pulse: 64   Temp: 97.7 °F (36.5 °C)   SpO2: 99%      Body mass index is 28.86 kg/m².    Assessment & Plan   Diagnoses and all orders for this visit:    1. COVID-19 virus infection (Primary)  -     HYDROcod Polst-CPM Polst ER (Tussionex Pennkinetic ER) 10-8 MG/5ML ER suspension; Take 5 mL by mouth Every 12 (Twelve) Hours As Needed for Cough.  Dispense: 100 mL; Refill: 0    2. Viral URI with cough  -     HYDROcod Polst-CPM Polst ER (Tussionex Pennkinetic ER) 10-8 MG/5ML ER suspension; Take 5 mL by mouth Every 12 (Twelve) Hours As Needed for Cough.  Dispense: 100 mL; Refill: 0    3. Gastroesophageal reflux disease, unspecified whether esophagitis present      1.  COVID-19 viral infection with cough-symptoms have improved overall.  Recommend plain Mucinex and nasal steroid spray.  He is still with persistent dry cough.  We will send a prescription for Tussionex 5 mL every 12 hours as needed for cough.  Encouraged him to hydrate well with fluids, vitamin C, vitamin D.  Stay active as tolerated.  He has completed his 7-day quarantine.  Wear mask when in public for 10 days from symptom onset.  Notify for worsening symptoms.  2.  Acid reflux-recommend starting omeprazole 20 mg daily.  Discussed reflux precautions.  Continue this for the next 3-4 weeks.

## 2022-12-12 ENCOUNTER — PRE-ADMISSION TESTING (OUTPATIENT)
Dept: PREADMISSION TESTING | Facility: HOSPITAL | Age: 67
End: 2022-12-12

## 2022-12-12 VITALS
HEART RATE: 72 BPM | HEIGHT: 73 IN | OXYGEN SATURATION: 100 % | BODY MASS INDEX: 29.03 KG/M2 | SYSTOLIC BLOOD PRESSURE: 141 MMHG | TEMPERATURE: 97.9 F | WEIGHT: 219 LBS | RESPIRATION RATE: 16 BRPM | DIASTOLIC BLOOD PRESSURE: 90 MMHG

## 2022-12-12 LAB
ANION GAP SERPL CALCULATED.3IONS-SCNC: 9.9 MMOL/L (ref 5–15)
BUN SERPL-MCNC: 15 MG/DL (ref 8–23)
BUN/CREAT SERPL: 20.8 (ref 7–25)
CALCIUM SPEC-SCNC: 8.7 MG/DL (ref 8.6–10.5)
CHLORIDE SERPL-SCNC: 104 MMOL/L (ref 98–107)
CO2 SERPL-SCNC: 23.1 MMOL/L (ref 22–29)
CREAT SERPL-MCNC: 0.72 MG/DL (ref 0.76–1.27)
DEPRECATED RDW RBC AUTO: 38.2 FL (ref 37–54)
EGFRCR SERPLBLD CKD-EPI 2021: 99.5 ML/MIN/1.73
ERYTHROCYTE [DISTWIDTH] IN BLOOD BY AUTOMATED COUNT: 12.2 % (ref 12.3–15.4)
GLUCOSE SERPL-MCNC: 113 MG/DL (ref 65–99)
HCT VFR BLD AUTO: 43.1 % (ref 37.5–51)
HGB BLD-MCNC: 14.9 G/DL (ref 13–17.7)
MCH RBC QN AUTO: 29.7 PG (ref 26.6–33)
MCHC RBC AUTO-ENTMCNC: 34.6 G/DL (ref 31.5–35.7)
MCV RBC AUTO: 85.9 FL (ref 79–97)
PLATELET # BLD AUTO: 234 10*3/MM3 (ref 140–450)
PMV BLD AUTO: 9.6 FL (ref 6–12)
POTASSIUM SERPL-SCNC: 4.2 MMOL/L (ref 3.5–5.2)
RBC # BLD AUTO: 5.02 10*6/MM3 (ref 4.14–5.8)
SODIUM SERPL-SCNC: 137 MMOL/L (ref 136–145)
WBC NRBC COR # BLD: 5.78 10*3/MM3 (ref 3.4–10.8)

## 2022-12-12 PROCEDURE — 36415 COLL VENOUS BLD VENIPUNCTURE: CPT

## 2022-12-12 PROCEDURE — 85027 COMPLETE CBC AUTOMATED: CPT

## 2022-12-12 PROCEDURE — 80048 BASIC METABOLIC PNL TOTAL CA: CPT

## 2022-12-12 RX ORDER — TAMSULOSIN HYDROCHLORIDE 0.4 MG/1
1 CAPSULE ORAL DAILY
COMMUNITY
Start: 2022-11-30

## 2022-12-12 NOTE — DISCHARGE INSTRUCTIONS
Take the following medications the morning of surgery:  METOPROLOL      If you are on prescription narcotic pain medication to control your pain you may also take that medication the morning of surgery.    General Instructions:  Do not eat solid food after midnight the night before surgery.  You may drink clear liquids day of surgery but must stop at least one hour before your hospital arrival time.  It is beneficial for you to have a clear drink that contains carbohydrates the day of surgery.  We suggest a 12 to 20 ounce bottle of Gatorade or Powerade for non-diabetic patients or a 12 to 20 ounce bottle of G2 or Powerade Zero for diabetic patients. (Pediatric patients, are not advised to drink a 12 to 20 ounce carbohydrate drink)    Clear liquids are liquids you can see through.  Nothing red in color.     Plain water                               Sports drinks  Sodas                                   Gelatin (Jell-O)  Fruit juices without pulp such as white grape juice and apple juice  Popsicles that contain no fruit or yogurt  Tea or coffee (no cream or milk added)  Gatorade / Powerade  G2 / Powerade Zero      Patients who avoid smoking, chewing tobacco and alcohol for 4 weeks prior to surgery have a reduced risk of post-operative complications.  Quit smoking as many days before surgery as you can.  Do not smoke, use chewing tobacco or drink alcohol the day of surgery.   If applicable bring your C-PAP/ BI-PAP machine.  Bring any papers given to you in the doctor’s office.  Wear clean comfortable clothes.  Do not wear contact lenses, false eyelashes or make-up.  Bring a case for your glasses.   Bring crutches or walker if applicable.  Remove all piercings.  Leave jewelry and any other valuables at home.  Hair extensions with metal clips must be removed prior to surgery.  The Pre-Admission Testing nurse will instruct you to bring medications if unable to obtain an accurate list in Pre-Admission Testing.           Preventing a Surgical Site Infection:  For 2 to 3 days before surgery, avoid shaving with a razor because the razor can irritate skin and make it easier to develop an infection.    Any areas of open skin can increase the risk of a post-operative wound infection by allowing bacteria to enter and travel throughout the body.  Notify your surgeon if you have any skin wounds / rashes even if it is not near the expected surgical site.  The area will need assessed to determine if surgery should be delayed until it is healed.  The night prior to surgery shower using a fresh bar of anti-bacterial soap (such as Dial) and clean washcloth.  Sleep in a clean bed with clean clothing.  Do not allow pets to sleep with you.  Shower on the morning of surgery using a fresh bar of anti-bacterial soap (such as Dial) and clean washcloth.  Dry with a clean towel and dress in clean clothing.  Ask your surgeon if you will be receiving antibiotics prior to surgery.  Make sure you, your family, and all healthcare providers clean their hands with soap and water or an alcohol based hand  before caring for you or your wound.    Day of surgery:  Your arrival time is approximately two hours before your scheduled surgery time.  Upon arrival, a Pre-op nurse and Anesthesiologist will review your health history, obtain vital signs, and answer questions you may have.  The only belongings needed at this time will be a list of your home medications and if applicable your C-PAP/BI-PAP machine.  A Pre-op nurse will start an IV and you may receive medication in preparation for surgery, including something to help you relax.     Please be aware that surgery does come with discomfort.  We want to make every effort to control your discomfort so please discuss any uncontrolled symptoms with your nurse.   Your doctor will most likely have prescribed pain medications.      If you are going home after surgery you will receive individualized  written care instructions before being discharged.  A responsible adult must drive you to and from the hospital on the day of your surgery and stay with you for 24 hours.  Discharge prescriptions can be filled by the hospital pharmacy during regular pharmacy hours.  If you are having surgery late in the day/evening your prescription may be e-prescribed to your pharmacy.  Please verify your pharmacy hours or chose a 24 hour pharmacy to avoid not having access to your prescription because your pharmacy has closed for the day.    If you are staying overnight following surgery, you will be transported to your hospital room following the recovery period.  [facility] has all private rooms.    If you have any questions please call Pre-Admission Testing at [phone].  Deductibles and co-payments are collected on the day of service. Please be prepared to pay the required co-pay, deductible or deposit on the day of service as defined by your plan.    Call your surgeon immediately if you experience any of the following symptoms:  Sore Throat  Shortness of Breath or difficulty breathing  Cough  Chills  Body soreness or muscle pain  Headache  Fever  New loss of taste or smell  Do not arrive for your surgery ill.  Your procedure will need to be rescheduled to another time.  You will need to call your physician before the day of surgery to avoid any unnecessary exposure to hospital staff as well as other patients.

## 2022-12-13 RX ORDER — METOPROLOL SUCCINATE 25 MG/1
25 TABLET, EXTENDED RELEASE ORAL DAILY
Qty: 90 TABLET | Refills: 1 | Status: SHIPPED | OUTPATIENT
Start: 2022-12-13 | End: 2023-03-07

## 2022-12-20 ENCOUNTER — OFFICE VISIT (OUTPATIENT)
Dept: ORTHOPEDIC SURGERY | Facility: CLINIC | Age: 67
End: 2022-12-20

## 2022-12-20 VITALS — BODY MASS INDEX: 29.95 KG/M2 | HEIGHT: 73 IN | WEIGHT: 226 LBS | TEMPERATURE: 97.4 F

## 2022-12-20 DIAGNOSIS — M51.36 DISC DEGENERATION, LUMBAR: Primary | ICD-10-CM

## 2022-12-20 PROCEDURE — 99213 OFFICE O/P EST LOW 20 MIN: CPT | Performed by: ORTHOPAEDIC SURGERY

## 2022-12-20 NOTE — PROGRESS NOTES
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 Moralez on 12/27 of this year.  He has been out on disability from his job as an , and reasonably so and I have seen him every 3 months to administrate this.  I am going to make him a appointment with Dr. Ford to continue in this regard.  He has multilevel degenerative change and may at some point in the distant future be a candidate for multilevel fusion but is nowhere near the point of needing this.  He will see Dr. Ford in 3 months

## 2022-12-27 ENCOUNTER — HOSPITAL ENCOUNTER (OUTPATIENT)
Facility: HOSPITAL | Age: 67
Setting detail: HOSPITAL OUTPATIENT SURGERY
Discharge: HOME | End: 2022-12-27
Attending: ORTHOPAEDIC SURGERY | Admitting: ORTHOPAEDIC SURGERY

## 2022-12-27 ENCOUNTER — ANESTHESIA (OUTPATIENT)
Dept: PERIOP | Facility: HOSPITAL | Age: 67
End: 2022-12-27

## 2022-12-27 ENCOUNTER — ANESTHESIA EVENT (OUTPATIENT)
Dept: PERIOP | Facility: HOSPITAL | Age: 67
End: 2022-12-27

## 2022-12-27 VITALS
HEART RATE: 64 BPM | SYSTOLIC BLOOD PRESSURE: 137 MMHG | TEMPERATURE: 97.5 F | RESPIRATION RATE: 16 BRPM | DIASTOLIC BLOOD PRESSURE: 98 MMHG | OXYGEN SATURATION: 97 %

## 2022-12-27 DIAGNOSIS — M25.512 CHRONIC LEFT SHOULDER PAIN: ICD-10-CM

## 2022-12-27 DIAGNOSIS — G89.29 CHRONIC LEFT SHOULDER PAIN: ICD-10-CM

## 2022-12-27 PROCEDURE — 29827 SHO ARTHRS SRG RT8TR CUF RPR: CPT | Performed by: NURSE PRACTITIONER

## 2022-12-27 PROCEDURE — 25010000002 MAGNESIUM SULFATE PER 500 MG OF MAGNESIUM: Performed by: NURSE ANESTHETIST, CERTIFIED REGISTERED

## 2022-12-27 PROCEDURE — 25010000002 FENTANYL CITRATE (PF) 50 MCG/ML SOLUTION: Performed by: ANESTHESIOLOGY

## 2022-12-27 PROCEDURE — 25010000002 DEXAMETHASONE SODIUM PHOSPHATE 20 MG/5ML SOLUTION: Performed by: NURSE ANESTHETIST, CERTIFIED REGISTERED

## 2022-12-27 PROCEDURE — 25010000002 FENTANYL CITRATE (PF) 100 MCG/2ML SOLUTION: Performed by: NURSE ANESTHETIST, CERTIFIED REGISTERED

## 2022-12-27 PROCEDURE — 29828 SHO ARTHRS SRG BICP TENODSIS: CPT | Performed by: NURSE PRACTITIONER

## 2022-12-27 PROCEDURE — 25010000002 ONDANSETRON PER 1 MG: Performed by: NURSE ANESTHETIST, CERTIFIED REGISTERED

## 2022-12-27 PROCEDURE — 29826 SHO ARTHRS SRG DECOMPRESSION: CPT | Performed by: ORTHOPAEDIC SURGERY

## 2022-12-27 PROCEDURE — L3670 SO ACRO/CLAV CAN WEB PRE OTS: HCPCS | Performed by: ORTHOPAEDIC SURGERY

## 2022-12-27 PROCEDURE — 25010000002 MIDAZOLAM PER 1 MG: Performed by: ANESTHESIOLOGY

## 2022-12-27 PROCEDURE — 29826 SHO ARTHRS SRG DECOMPRESSION: CPT | Performed by: NURSE PRACTITIONER

## 2022-12-27 PROCEDURE — 25010000002 NEOSTIGMINE 5 MG/10ML SOLUTION: Performed by: NURSE ANESTHETIST, CERTIFIED REGISTERED

## 2022-12-27 PROCEDURE — C1713 ANCHOR/SCREW BN/BN,TIS/BN: HCPCS | Performed by: ORTHOPAEDIC SURGERY

## 2022-12-27 PROCEDURE — 29827 SHO ARTHRS SRG RT8TR CUF RPR: CPT | Performed by: ORTHOPAEDIC SURGERY

## 2022-12-27 PROCEDURE — 29828 SHO ARTHRS SRG BICP TENODSIS: CPT | Performed by: ORTHOPAEDIC SURGERY

## 2022-12-27 PROCEDURE — C9290 INJ, BUPIVACAINE LIPOSOME: HCPCS | Performed by: ANESTHESIOLOGY

## 2022-12-27 PROCEDURE — 0 BUPIVACAINE LIPOSOME 1.3 % SUSPENSION: Performed by: ANESTHESIOLOGY

## 2022-12-27 PROCEDURE — 25010000002 PROPOFOL 10 MG/ML EMULSION: Performed by: NURSE ANESTHETIST, CERTIFIED REGISTERED

## 2022-12-27 PROCEDURE — 25010000002 CEFAZOLIN IN DEXTROSE 2-4 GM/100ML-% SOLUTION: Performed by: ORTHOPAEDIC SURGERY

## 2022-12-27 PROCEDURE — 76942 ECHO GUIDE FOR BIOPSY: CPT | Performed by: ORTHOPAEDIC SURGERY

## 2022-12-27 PROCEDURE — 25010000002 EPINEPHRINE PER 0.1 MG: Performed by: ORTHOPAEDIC SURGERY

## 2022-12-27 DEVICE — FIBERTAK RC, TRIPLOAD TAPE BL/W, BLK/W W
Type: IMPLANTABLE DEVICE | Site: SHOULDER | Status: FUNCTIONAL
Brand: ARTHREX®

## 2022-12-27 DEVICE — BIO-COMP SWVLK C, CLD 4.75X19.1MM
Type: IMPLANTABLE DEVICE | Site: SHOULDER | Status: FUNCTIONAL
Brand: ARTHREX®

## 2022-12-27 RX ORDER — ONDANSETRON 2 MG/ML
4 INJECTION INTRAMUSCULAR; INTRAVENOUS ONCE AS NEEDED
Status: DISCONTINUED | OUTPATIENT
Start: 2022-12-27 | End: 2022-12-27 | Stop reason: HOSPADM

## 2022-12-27 RX ORDER — ONDANSETRON 2 MG/ML
INJECTION INTRAMUSCULAR; INTRAVENOUS AS NEEDED
Status: DISCONTINUED | OUTPATIENT
Start: 2022-12-27 | End: 2022-12-27 | Stop reason: SURG

## 2022-12-27 RX ORDER — ROCURONIUM BROMIDE 10 MG/ML
INJECTION, SOLUTION INTRAVENOUS AS NEEDED
Status: DISCONTINUED | OUTPATIENT
Start: 2022-12-27 | End: 2022-12-27 | Stop reason: SURG

## 2022-12-27 RX ORDER — PROMETHAZINE HYDROCHLORIDE 25 MG/1
25 TABLET ORAL ONCE AS NEEDED
Status: DISCONTINUED | OUTPATIENT
Start: 2022-12-27 | End: 2022-12-27 | Stop reason: HOSPADM

## 2022-12-27 RX ORDER — MAGNESIUM SULFATE HEPTAHYDRATE 500 MG/ML
INJECTION, SOLUTION INTRAMUSCULAR; INTRAVENOUS AS NEEDED
Status: DISCONTINUED | OUTPATIENT
Start: 2022-12-27 | End: 2022-12-27 | Stop reason: SURG

## 2022-12-27 RX ORDER — PROPOFOL 10 MG/ML
VIAL (ML) INTRAVENOUS AS NEEDED
Status: DISCONTINUED | OUTPATIENT
Start: 2022-12-27 | End: 2022-12-27 | Stop reason: SURG

## 2022-12-27 RX ORDER — DOCUSATE SODIUM 100 MG/1
100 CAPSULE, LIQUID FILLED ORAL 2 TIMES DAILY
Qty: 60 CAPSULE | Refills: 0 | Status: SHIPPED | OUTPATIENT
Start: 2022-12-27

## 2022-12-27 RX ORDER — OXYCODONE AND ACETAMINOPHEN 10; 325 MG/1; MG/1
1 TABLET ORAL EVERY 4 HOURS PRN
Qty: 42 TABLET | Refills: 0 | Status: SHIPPED | OUTPATIENT
Start: 2022-12-27 | End: 2023-03-07

## 2022-12-27 RX ORDER — SENNOSIDES 8.6 MG
650 CAPSULE ORAL EVERY 8 HOURS PRN
Qty: 50 TABLET | Refills: 0 | Status: SHIPPED | OUTPATIENT
Start: 2022-12-27 | End: 2023-03-07

## 2022-12-27 RX ORDER — DIPHENHYDRAMINE HYDROCHLORIDE 50 MG/ML
12.5 INJECTION INTRAMUSCULAR; INTRAVENOUS
Status: DISCONTINUED | OUTPATIENT
Start: 2022-12-27 | End: 2022-12-27 | Stop reason: HOSPADM

## 2022-12-27 RX ORDER — ONDANSETRON 4 MG/1
4 TABLET, FILM COATED ORAL EVERY 8 HOURS PRN
Qty: 30 TABLET | Refills: 0 | Status: SHIPPED | OUTPATIENT
Start: 2022-12-27 | End: 2023-03-07

## 2022-12-27 RX ORDER — SODIUM CHLORIDE 0.9 % (FLUSH) 0.9 %
10 SYRINGE (ML) INJECTION AS NEEDED
Status: DISCONTINUED | OUTPATIENT
Start: 2022-12-27 | End: 2022-12-27 | Stop reason: HOSPADM

## 2022-12-27 RX ORDER — DIPHENHYDRAMINE HCL 25 MG
25 CAPSULE ORAL
Status: DISCONTINUED | OUTPATIENT
Start: 2022-12-27 | End: 2022-12-27 | Stop reason: HOSPADM

## 2022-12-27 RX ORDER — LIDOCAINE HYDROCHLORIDE 20 MG/ML
INJECTION, SOLUTION INFILTRATION; PERINEURAL AS NEEDED
Status: DISCONTINUED | OUTPATIENT
Start: 2022-12-27 | End: 2022-12-27 | Stop reason: SURG

## 2022-12-27 RX ORDER — BUPIVACAINE HYDROCHLORIDE 2.5 MG/ML
INJECTION, SOLUTION EPIDURAL; INFILTRATION; INTRACAUDAL
Status: COMPLETED | OUTPATIENT
Start: 2022-12-27 | End: 2022-12-27

## 2022-12-27 RX ORDER — FLUMAZENIL 0.1 MG/ML
0.2 INJECTION INTRAVENOUS AS NEEDED
Status: DISCONTINUED | OUTPATIENT
Start: 2022-12-27 | End: 2022-12-27 | Stop reason: HOSPADM

## 2022-12-27 RX ORDER — EPHEDRINE SULFATE 50 MG/ML
INJECTION INTRAVENOUS AS NEEDED
Status: DISCONTINUED | OUTPATIENT
Start: 2022-12-27 | End: 2022-12-27 | Stop reason: SURG

## 2022-12-27 RX ORDER — HYDROCODONE BITARTRATE AND ACETAMINOPHEN 7.5; 325 MG/1; MG/1
1 TABLET ORAL ONCE AS NEEDED
Status: DISCONTINUED | OUTPATIENT
Start: 2022-12-27 | End: 2022-12-27 | Stop reason: HOSPADM

## 2022-12-27 RX ORDER — EPHEDRINE SULFATE 50 MG/ML
5 INJECTION, SOLUTION INTRAVENOUS ONCE AS NEEDED
Status: DISCONTINUED | OUTPATIENT
Start: 2022-12-27 | End: 2022-12-27 | Stop reason: HOSPADM

## 2022-12-27 RX ORDER — ACETAMINOPHEN 650 MG
TABLET, EXTENDED RELEASE ORAL AS NEEDED
Status: DISCONTINUED | OUTPATIENT
Start: 2022-12-27 | End: 2022-12-27 | Stop reason: HOSPADM

## 2022-12-27 RX ORDER — OXYCODONE AND ACETAMINOPHEN 7.5; 325 MG/1; MG/1
1 TABLET ORAL EVERY 4 HOURS PRN
Status: DISCONTINUED | OUTPATIENT
Start: 2022-12-27 | End: 2022-12-27 | Stop reason: HOSPADM

## 2022-12-27 RX ORDER — GLYCOPYRROLATE 0.2 MG/ML
INJECTION INTRAMUSCULAR; INTRAVENOUS AS NEEDED
Status: DISCONTINUED | OUTPATIENT
Start: 2022-12-27 | End: 2022-12-27 | Stop reason: SURG

## 2022-12-27 RX ORDER — SODIUM CHLORIDE, SODIUM LACTATE, POTASSIUM CHLORIDE, CALCIUM CHLORIDE 600; 310; 30; 20 MG/100ML; MG/100ML; MG/100ML; MG/100ML
9 INJECTION, SOLUTION INTRAVENOUS CONTINUOUS PRN
Status: DISCONTINUED | OUTPATIENT
Start: 2022-12-27 | End: 2022-12-27 | Stop reason: HOSPADM

## 2022-12-27 RX ORDER — MIDAZOLAM HYDROCHLORIDE 1 MG/ML
0.5 INJECTION INTRAMUSCULAR; INTRAVENOUS
Status: DISCONTINUED | OUTPATIENT
Start: 2022-12-27 | End: 2022-12-27 | Stop reason: HOSPADM

## 2022-12-27 RX ORDER — SODIUM CHLORIDE, SODIUM LACTATE, POTASSIUM CHLORIDE, AND CALCIUM CHLORIDE .6; .31; .03; .02 G/100ML; G/100ML; G/100ML; G/100ML
IRRIGANT IRRIGATION AS NEEDED
Status: DISCONTINUED | OUTPATIENT
Start: 2022-12-27 | End: 2022-12-27 | Stop reason: HOSPADM

## 2022-12-27 RX ORDER — PROMETHAZINE HYDROCHLORIDE 25 MG/1
25 SUPPOSITORY RECTAL ONCE AS NEEDED
Status: DISCONTINUED | OUTPATIENT
Start: 2022-12-27 | End: 2022-12-27 | Stop reason: HOSPADM

## 2022-12-27 RX ORDER — HYDROMORPHONE HYDROCHLORIDE 1 MG/ML
0.5 INJECTION, SOLUTION INTRAMUSCULAR; INTRAVENOUS; SUBCUTANEOUS
Status: DISCONTINUED | OUTPATIENT
Start: 2022-12-27 | End: 2022-12-27 | Stop reason: HOSPADM

## 2022-12-27 RX ORDER — FENTANYL CITRATE 50 UG/ML
50 INJECTION, SOLUTION INTRAMUSCULAR; INTRAVENOUS
Status: DISCONTINUED | OUTPATIENT
Start: 2022-12-27 | End: 2022-12-27 | Stop reason: HOSPADM

## 2022-12-27 RX ORDER — DEXAMETHASONE SODIUM PHOSPHATE 4 MG/ML
INJECTION, SOLUTION INTRA-ARTICULAR; INTRALESIONAL; INTRAMUSCULAR; INTRAVENOUS; SOFT TISSUE AS NEEDED
Status: DISCONTINUED | OUTPATIENT
Start: 2022-12-27 | End: 2022-12-27 | Stop reason: SURG

## 2022-12-27 RX ORDER — NEOSTIGMINE METHYLSULFATE 0.5 MG/ML
INJECTION, SOLUTION INTRAVENOUS AS NEEDED
Status: DISCONTINUED | OUTPATIENT
Start: 2022-12-27 | End: 2022-12-27 | Stop reason: SURG

## 2022-12-27 RX ORDER — FENTANYL CITRATE 50 UG/ML
INJECTION, SOLUTION INTRAMUSCULAR; INTRAVENOUS AS NEEDED
Status: DISCONTINUED | OUTPATIENT
Start: 2022-12-27 | End: 2022-12-27 | Stop reason: SURG

## 2022-12-27 RX ORDER — SODIUM CHLORIDE 0.9 % (FLUSH) 0.9 %
10 SYRINGE (ML) INJECTION EVERY 12 HOURS SCHEDULED
Status: DISCONTINUED | OUTPATIENT
Start: 2022-12-27 | End: 2022-12-27 | Stop reason: HOSPADM

## 2022-12-27 RX ORDER — FENTANYL CITRATE 50 UG/ML
25 INJECTION, SOLUTION INTRAMUSCULAR; INTRAVENOUS
Status: DISCONTINUED | OUTPATIENT
Start: 2022-12-27 | End: 2022-12-27 | Stop reason: HOSPADM

## 2022-12-27 RX ORDER — HYDRALAZINE HYDROCHLORIDE 20 MG/ML
5 INJECTION INTRAMUSCULAR; INTRAVENOUS
Status: DISCONTINUED | OUTPATIENT
Start: 2022-12-27 | End: 2022-12-27 | Stop reason: HOSPADM

## 2022-12-27 RX ORDER — CEFAZOLIN SODIUM 2 G/100ML
2 INJECTION, SOLUTION INTRAVENOUS ONCE
Status: COMPLETED | OUTPATIENT
Start: 2022-12-27 | End: 2022-12-27

## 2022-12-27 RX ORDER — NALOXONE HCL 0.4 MG/ML
0.2 VIAL (ML) INJECTION AS NEEDED
Status: DISCONTINUED | OUTPATIENT
Start: 2022-12-27 | End: 2022-12-27 | Stop reason: HOSPADM

## 2022-12-27 RX ORDER — SODIUM CHLORIDE 9 MG/ML
40 INJECTION, SOLUTION INTRAVENOUS AS NEEDED
Status: DISCONTINUED | OUTPATIENT
Start: 2022-12-27 | End: 2022-12-27 | Stop reason: HOSPADM

## 2022-12-27 RX ORDER — LABETALOL HYDROCHLORIDE 5 MG/ML
5 INJECTION, SOLUTION INTRAVENOUS
Status: DISCONTINUED | OUTPATIENT
Start: 2022-12-27 | End: 2022-12-27 | Stop reason: HOSPADM

## 2022-12-27 RX ADMIN — EPHEDRINE SULFATE 5 MG: 50 INJECTION INTRAVENOUS at 08:48

## 2022-12-27 RX ADMIN — LIDOCAINE HYDROCHLORIDE 50 MG: 20 INJECTION, SOLUTION INFILTRATION; PERINEURAL at 08:18

## 2022-12-27 RX ADMIN — BUPIVACAINE 10 ML: 13.3 INJECTION, SUSPENSION, LIPOSOMAL INFILTRATION at 07:17

## 2022-12-27 RX ADMIN — ONDANSETRON 4 MG: 2 INJECTION INTRAMUSCULAR; INTRAVENOUS at 09:03

## 2022-12-27 RX ADMIN — EPHEDRINE SULFATE 5 MG: 50 INJECTION INTRAVENOUS at 09:38

## 2022-12-27 RX ADMIN — MIDAZOLAM 1 MG: 1 INJECTION INTRAMUSCULAR; INTRAVENOUS at 07:15

## 2022-12-27 RX ADMIN — EPHEDRINE SULFATE 5 MG: 50 INJECTION INTRAVENOUS at 08:44

## 2022-12-27 RX ADMIN — GLYCOPYRROLATE 0.2 MCG: 1 INJECTION INTRAMUSCULAR; INTRAVENOUS at 08:30

## 2022-12-27 RX ADMIN — NEOSTIGMINE METHYLSULFATE 3 MG: 0.5 INJECTION INTRAVENOUS at 09:34

## 2022-12-27 RX ADMIN — EPHEDRINE SULFATE 10 MG: 50 INJECTION INTRAVENOUS at 09:00

## 2022-12-27 RX ADMIN — CEFAZOLIN SODIUM 2 G: 2 INJECTION, SOLUTION INTRAVENOUS at 08:05

## 2022-12-27 RX ADMIN — GLYCOPYRROLATE 0.4 MCG: 1 INJECTION INTRAMUSCULAR; INTRAVENOUS at 09:34

## 2022-12-27 RX ADMIN — MAGNESIUM SULFATE HEPTAHYDRATE 2 G: 500 INJECTION, SOLUTION INTRAMUSCULAR; INTRAVENOUS at 08:23

## 2022-12-27 RX ADMIN — PROPOFOL 200 MG: 10 INJECTION, EMULSION INTRAVENOUS at 08:18

## 2022-12-27 RX ADMIN — ROCURONIUM BROMIDE 50 MG: 10 INJECTION, SOLUTION INTRAVENOUS at 08:19

## 2022-12-27 RX ADMIN — FENTANYL CITRATE 50 MCG: 50 INJECTION INTRAMUSCULAR; INTRAVENOUS at 08:24

## 2022-12-27 RX ADMIN — DEXAMETHASONE SODIUM PHOSPHATE 10 MG: 4 INJECTION, SOLUTION INTRAMUSCULAR; INTRAVENOUS at 08:23

## 2022-12-27 RX ADMIN — FENTANYL CITRATE 50 MCG: 50 INJECTION, SOLUTION INTRAMUSCULAR; INTRAVENOUS at 07:15

## 2022-12-27 RX ADMIN — SODIUM CHLORIDE, POTASSIUM CHLORIDE, SODIUM LACTATE AND CALCIUM CHLORIDE 9 ML/HR: 600; 310; 30; 20 INJECTION, SOLUTION INTRAVENOUS at 06:54

## 2022-12-27 RX ADMIN — FENTANYL CITRATE 50 MCG: 50 INJECTION INTRAMUSCULAR; INTRAVENOUS at 08:18

## 2022-12-27 RX ADMIN — BUPIVACAINE HYDROCHLORIDE 20 ML: 2.5 INJECTION, SOLUTION EPIDURAL; INFILTRATION; INTRACAUDAL; PERINEURAL at 07:17

## 2022-12-27 RX ADMIN — EPHEDRINE SULFATE 5 MG: 50 INJECTION INTRAVENOUS at 09:42

## 2022-12-27 NOTE — OP NOTE
Orthopaedic Operative Note    Facility: [facility]    Patient: Federico Angeles    Medical Record Number: [id number]    YOB: 1955    Dictating Surgeon: Jackson Moralez M.D.*    Primary Care Physician: Ying Villatoro MD    Date of Operation: 12/27/2022    Pre-Operative Diagnosis:  Left rotator cuff tendon tear    Post-Operative Diagnosis:  Left rotator cuff tendon tear, degenerative superior glenoid labral tear with biceps instability    Procedure Performed:   1.  Arthroscopic rotator cuff repair with subacromial decompression    2.  Arthroscopic biceps tenodesis    Surgeon: Jackson Moralez MD     Assistant: MARLENY Garay whose assistance was critical for help with patient positioning, suctioning and irrigation, retraction, manipulation of the extremity for insertion of the implants, wound closure and application of the bandages.  Her assistance was critical to the success of this case.    Anesthesia: Regional followed by general.     Complications: None.     Estimated Blood Loss: Less than 50 mL.     Implants: 1 Arthrex FiberTak anchor for medial row rotator cuff repair, 2 Arthrex 4.75 mm SwiveLock anchors for lateral row repair and biceps tenodesis    Specimens: * No orders in the log *    Brief Operative Indication:  Mr. Angeles had a history of a torn left rotator cuff which had been persistently symptomatic.  We talked about surgical and nonsurgical treatment options.  The patient was felt to be a candidate for repair.   I explained that surgical risks include infection, hematoma, hardware related complications including failure of fixation, cutout, arthrofibrosis, re-tear, persistent pain and/or loss of motion, iatrogenic nerve and/or blood vessel injury resulting in permanent weakness, numbness or dysfunction, DVT, PE, positioning related neuropraxia, and anesthesia related complications resulting in death.     Description of Procedure in Detail:  The patient and operative  site were identified in the preoperative holding area.  The surgical site was marked with the patient's confirmation.  Adequate regional anesthesia of the left upper extremity was administered by the anesthesiologist.  The patient was then taken to the operating room and placed in the supine position.  Adequate general anesthesia was then administered.  The patient was repositioned into the lateral decubitus position.  All bony prominences were carefully padded and protected.  The left upper extremity was prepped and draped in the standard sterile fashion.  I cleaned the extremity with an alcohol solution.  A Hibiclens scrub was performed.  Lastly, the extremity was prepped with 2 ChloraPrep preps.  I allowed those to dry for approximately 3 minutes before the draping procedure was carried out.  A timeout was taken and preoperative antibiotics administered prior to surgical incision.      The arm was placed into 10 pounds of lateral traction.  A standard posterior portal was established.  The scope was inserted into the joint and directed anteriorly to the rotator interval where a standard anterior portal was established.  A 7 mm cannula was inserted into the joint and then a diagnostic arthroscopy performed.  There was what appeared to be a high-grade partial-thickness tear of the anterior supraspinatus.  At this point, it did not appear that it extended all the way through but the depth of the tearing measured over 7 mm.  The subscapularis, posterior supraspinatus, infraspinatus and far posterior cuff all appeared to be intact.    His articular cartilage looked fine.  He did have degenerative tearing of the superior and anterosuperior glenoid labrum with large flap tears which I debrided with a shaver.  The biceps anchor was detached from the supraglenoid tubercle and the biceps itself was unstable when probed.  I determined that a biceps tenodesis was indicated.  The long head of the biceps was released in  anticipation of a later tenodesis of this structure.  The biceps stump and degenerative superior labral tear were then debrided with a shaver.  The remainder of his labrum was probed and confirmed to be intact and stable.    Having completed the work in the joint, I then directed my attention to the subacromial space.  A lateral portal was established approximately 2 cm off the lateral edge of the acromion.  A shaver was inserted and then an extensive bursectomy performed.  There was a modest subacromial spur and fraying of the coracoacromial ligament consistent with impingement.  An Arthrex Apollo device was used to release the coracoacromial ligament.  An acromioplasty was performed in the typical fashion using a barrel-tip grecia.      The rotator cuff was then examined from the bursal side.  The tear was confirmed to be full-thickness at this point.  There was just a small full-thickness communication down into the joint.  I did extend this to open up the entire area of the partial-thickness tearing which was maybe a centimeter in width.  His tissue was good quality and easily repairable.  The rotator cuff insertion site was debrided back to bleeding healthy-appearing bone to create a healing response.  The tuberosity was also microfractured to create a bone marrow healing response.  An accessory portal was established just off the lateral edge of the acromion and a single Arthrex triple-loaded anchor was then placed along the articular cartilage margin for the medial row repair.  The anchor seated well and got good purchase in the bone.    Using standard suture shuttling technique and a self-retrieving Scorpion device, the sutures were sequentially shuttled through the rotator cuff tissue from anterior to posterior in a mattress fashion.  Once I had completed suture passage, the sutures were sequentially tied from posterior to anterior using 6 throw surgeon's knots with reverse half hitches on alternating posts.   I got an excellent repair along the medial row.  There was sufficient good quality tissue to allow for placement of lateral row anchors.  Unfortunately during suture passage I had accidentally cut part of one of the anterior suture limbs such that the suture limbs were too small to incorporate into the lateral row.  I ended up having to leave the most anterior mattress stitch as a simple stitch.      2 of the remaining sutures from the medial row anchor were brought out through the lateral portal and passed through a 4.75 mm SwiveLock anchor.  This anchor was punched and placed along the posterior aspect of the tuberosity.  The anchor seated well and got excellent purchase.  The excess sutures were cut and removed.  The remaining sutures were brought out through the lateral portal and passed through a second SwiveLock anchor.  This anchor was punched and placed a little more anteriorly on the tuberosity to complete the double row construct.  I placed this anchor directly adjacent to the biceps groove so I could use the anchor for the biceps tenodesis.  Again, this anchor seated well and got excellent purchase.  The excess sutures were cut and removed excepting the sutures from the anchor itself which I saved for the biceps tenodesis.  This created what seemed to be an anatomic repair of the rotator cuff footprint.  There were no loose ends or dog ears.  The tissue seemed to be well fixed without excessive tension.      The biceps was exposed at the top of the groove.  This was delivered into the subacromial space.  The diseased, intra-articular portion of the biceps was carefully debrided with a shaver.  The sutures from the swivel lock anchor were sequentially shuttled through the biceps using a self retrieving scorpion.  The sutures were then tied over the top of the biceps using a 6 throw surgeon's knot.  The excess sutures were cut and removed.  The biceps seem to be secure.  This was stable when probed.    The  wounds were copiously irrigated out with sterile saline and closed in a layered fashion using Monocryl for the deep tissues and nylon for the skin.  Sterile dressings were applied.  The drapes were withdrawn.  The arm was placed in an immobilizer.  Mr. Angeles was awakened and taken to the recovery room in good condition.    Jackson Moralez MD  12/27/22

## 2022-12-27 NOTE — ANESTHESIA PROCEDURE NOTES
Airway  Urgency: elective    Date/Time: 12/27/2022 8:23 AM  Airway not difficult    General Information and Staff    Patient location during procedure: OR  Anesthesiologist: Pj Belcher MD  CRNA/CAA: An Michel CRNA    Indications and Patient Condition    Preoxygenated: yes  Mask difficulty assessment: 1 - vent by mask    Final Airway Details  Final airway type: endotracheal airway      Successful airway: ETT  Cuffed: yes   Successful intubation technique: direct laryngoscopy  Endotracheal tube insertion site: oral  Blade: Arelis  Blade size: 4  ETT size (mm): 7.5  Cormack-Lehane Classification: grade IIa - partial view of glottis  Placement verified by: chest auscultation and capnometry   Measured from: teeth  ETT/EBT  to teeth (cm): 22  Number of attempts at approach: 1  Assessment: lips, teeth, and gum same as pre-op and atraumatic intubation

## 2022-12-27 NOTE — ANESTHESIA PROCEDURE NOTES
Peripheral Block      Patient reassessed immediately prior to procedure    Patient location during procedure: pre-op  Start time: 12/27/2022 7:17 AM  Stop time: 12/27/2022 7:20 AM  Reason for block: at surgeon's request and post-op pain management  Performed by  Anesthesiologist: Pj Belcher MD  Preanesthetic Checklist  Completed: patient identified, IV checked, site marked, risks and benefits discussed, surgical consent, monitors and equipment checked, pre-op evaluation and timeout performed  Prep:  Pt Position: sitting  Sterile barriers:cap, gloves, sterile barriers and mask  Prep: ChloraPrep  Patient monitoring: blood pressure monitoring, continuous pulse oximetry and EKG  Procedure    Sedation: yes    Guidance:ultrasound guided    ULTRASOUND INTERPRETATION.  Using ultrasound guidance a 21 G gauge needle was placed in close proximity to the brachial plexus nerve, at which point, under ultrasound guidance anesthetic was injected in the area of the nerve and spread of the anesthesia was seen on ultrasound in close proximity thereto.  There were no abnormalities seen on ultrasound; a digital image was taken; and the patient tolerated the procedure with no complications. Images:still images obtained, printed/placed on chart    Laterality:left  Block Type:interscalene  Injection Technique:single-shot  Needle Type:echogenic  Needle Gauge:21 G  Resistance on Injection: none    Medications Used: bupivacaine liposome (EXPAREL) 1.3 % injection - Infiltration   10 mL - 12/27/2022 7:17:00 AM  bupivacaine PF (MARCAINE) 0.25 % injection - Injection   20 mL - 12/27/2022 7:17:00 AM      Post Assessment  Injection Assessment: negative aspiration for heme, no paresthesia on injection and incremental injection  Patient Tolerance:comfortable throughout block  Complications:no

## 2022-12-27 NOTE — H&P
History & Physical       Patient: Federico Angeles    YOB: 1955    Medical Record Number: [id number]    Chief Complaints: Preop    History of Present Illness: 66 y.o. male presents today in anticipation of upcoming surgery.  Denies any changes to medical history.  Denies any changes to his symptomatology.    Allergies: No Known Allergies    Home Medications:    Current Facility-Administered Medications:   •  ceFAZolin in dextrose (ANCEF) IVPB solution 2 g, 2 g, Intravenous, Once, Jackson Moralez MD  •  fentaNYL citrate (PF) (SUBLIMAZE) injection 25 mcg, 25 mcg, Intravenous, Q10 Min PRN, Pj Belcher MD  •  lactated ringers infusion, 9 mL/hr, Intravenous, Continuous PRN, Pj Belcher MD  •  midazolam (VERSED) injection 0.5 mg, 0.5 mg, Intravenous, Q10 Min PRN, Pj Belcher MD  •  sodium chloride 0.9 % flush 10 mL, 10 mL, Intravenous, Q12H, Pj Belcher MD  •  sodium chloride 0.9 % flush 10 mL, 10 mL, Intravenous, PRN, Pj Belcher MD  •  sodium chloride 0.9 % infusion 40 mL, 40 mL, Intravenous, PRN, Pj Belcher MD    Past Medical History:   Diagnosis Date   • Chronic left shoulder pain    • Enlarged prostate    • Generalized headaches     HISTORY OF   • History of hepatitis    • Hypertension    • Insomnia    • Leg cramps    • Low back pain 2016    Lower back pain for years-FOLLOWED BY PAIN MANAGEMENT   • PONV (postoperative nausea and vomiting)    • Raynauds disease    • Tinnitus     BILATERAL          Past Surgical History:   Procedure Laterality Date   • NECK SURGERY  1979 and 1989   • SHOULDER ARTHROSCOPY Right 2/8/2022    Procedure: SHOULDER ARTHROSCOPY , DISTAL CLAVICLE EXCISION,  BICEPS TENODESIS , ROTATATOR CUFF REPAIR;  Surgeon: Jackson Moralez MD;  Location: [facility] OR [facility];  Service: Orthopedics;  Laterality: Right;          Social History     Occupational History   • Occupation: air      Employer: [employer]   Tobacco Use   • Smoking status: Never   •  Smokeless tobacco: Never   • Tobacco comments:     Only as teen   Vaping Use   • Vaping Use: Never used   Substance and Sexual Activity   • Alcohol use: No   • Drug use: No   • Sexual activity: Yes     Partners: Female     Birth control/protection: None      Social History     Social History Narrative    2 grown children    2 grandchildren          Family History   Problem Relation Age of Onset   • Heart attack Father 55        drinker   • Liver disease Father    • Alcohol abuse Father    • No Known Problems Sister    • No Known Problems Brother    • Malig Hyperthermia Neg Hx        Review of Systems:  A 14 point review of systems is reviewed with the patient.  Pertinent positives are listed above.  All others are negative.    Physical Exam: 66 y.o. male    Vitals:    12/27/22 0621   BP: 141/89   Patient Position: Lying   Pulse: 70   Resp: 16   Temp: 98.4 °F (36.9 °C)   TempSrc: Oral   SpO2: 98%       General:  Patient is awake and alert.  Appears in no acute distress or discomfort.    Psych:  Affect and demeanor are appropriate.    Eyes:  Conjunctiva and sclera appear grossly normal.  Eyes track well and EOM seem to be intact.    Ears:  No gross abnormalities.  Hearing adequate for the exam.    Cardiovascular:  Regular rate and rhythm.    Lungs:  Good chest expansion.  Breathing unlabored.    Lymph:  No palpable adenopathy about neck or axilla.    Extremity:  Skin benign and intact without evidence for swelling, masses or atrophy.  Exam otherwise deferred at this time.    Diagnostic Tests:  Lab Results   Component Value Date    GLUCOSE 113 (H) 12/12/2022    CALCIUM 8.7 12/12/2022     12/12/2022    K 4.2 12/12/2022    CO2 23.1 12/12/2022     12/12/2022    BUN 15 12/12/2022    CREATININE 0.72 (L) 12/12/2022    EGFRIFAFRI 130 05/14/2021    EGFRIFNONA 99 01/25/2022    BCR 20.8 12/12/2022    ANIONGAP 9.9 12/12/2022     Lab Results   Component Value Date    WBC 5.78 12/12/2022    HGB 14.9 12/12/2022    HCT  43.1 12/12/2022    MCV 85.9 12/12/2022     12/12/2022     No results found for: INR, PROTIME     MRI shows a small full-thickness rotator cuff tear with a larger associated high-grade partial-thickness tear.    Assessment:  Left rotator cuff tear    Plan: The patient denies any changes to their symptomatology.  Will proceed with surgery as planned.  I again offered the him a chance to ask any questions about the upcoming procedure.  He stated that he had a good understanding of everything and had no questions.    Jackson Moralez MD  12/27/22

## 2022-12-27 NOTE — ANESTHESIA PREPROCEDURE EVALUATION
Anesthesia Evaluation     Patient summary reviewed and Nursing notes reviewed   history of anesthetic complications:  NPO Solid Status: > 6 hours  NPO Liquid Status: > 2 hours           Airway   Mallampati: II  TM distance: >3 FB  Neck ROM: full  Dental - normal exam     Pulmonary    (-) COPD, asthma, sleep apnea  Cardiovascular   Exercise tolerance: good (4-7 METS)    ECG reviewed    (+) hypertension,   (-) valvular problems/murmurs, CAD, dysrhythmias, angina      Neuro/Psych  (+) headaches,    (-) seizures, CVA  GI/Hepatic/Renal/Endo    (-) GERD, liver disease, no renal disease, diabetes, no thyroid disorder    Musculoskeletal     Abdominal    Substance History      OB/GYN          Other                        Anesthesia Plan    ASA 2     general with block       Anesthetic plan, risks, benefits, and alternatives have been provided, discussed and informed consent has been obtained with: patient.        CODE STATUS:

## 2022-12-27 NOTE — ANESTHESIA POSTPROCEDURE EVALUATION
Patient: Federico Angeles    Procedure Summary     Date: 12/27/22 Room / Location: [facility] OR [address] [place] [address] [facility]    Anesthesia Start: 0812 Anesthesia Stop: 0957    Procedure: SHOULDER ARTHROSCOPY WITH ROTATOR CUFF REPAIR WITH BICEP TENODESIS (Left: Shoulder) Diagnosis:       Chronic left shoulder pain      (Chronic left shoulder pain [M25.512, G89.29])    Surgeons: Jackson Moralez MD Provider: Pj Belcher MD    Anesthesia Type: general with block ASA Status: 2          Anesthesia Type: general with block    Vitals  Vitals Value Taken Time   /80 12/27/22 1016   Temp 36.4 °C (97.5 °F) 12/27/22 1015   Pulse 66 12/27/22 1021   Resp 16 12/27/22 1015   SpO2 96 % 12/27/22 1021   Vitals shown include unvalidated device data.        Post Anesthesia Care and Evaluation    Patient location during evaluation: PHASE II  Patient participation: complete - patient participated  Level of consciousness: awake  Pain management: satisfactory to patient    Airway patency: patent  Anesthetic complications: No anesthetic complications  PONV Status: controlled  Cardiovascular status: acceptable  Respiratory status: acceptable  Hydration status: acceptable

## 2022-12-27 NOTE — DISCHARGE INSTRUCTIONS
What to expect after a Nerve Block    Nerve blocks administered to block pain affect many types of nerves, including those nerves that control movement, pain, and normal sensation. Following a nerve block, you may notice some bruising at the site where the block was given. You may experience sensations such as: numbness of the affected area or limb, tingling, heaviness (that is the limb feels heavy to you), weakness or inability to move the affected arm or leg, or a feeling as if your arm or leg has “fallen asleep.”     A nerve block can last from 2 to 36 hours depending on the medications used.  Usually the weakness wears off first followed by the tingling and heaviness. As the block wears off, you may begin to notice pain; however, this sequence of events may occur in any order. Typically, you will be able to move your limb before you will feel it. Once a nerve block begins to wear off, the effects are usually completely gone within 60 minutes.  If you experience continued side effects that you believe are block related for longer than 48 hours, please call your healthcare provider. Please see block-specific instructions below.    Instructions for any block involving the shoulder or arm  If you have had any kind of shoulder/arm block, you will go home with your arm in a sling. Wear the sling until the block has completely worn off. You may be required to wear it for a longer period of time per your surgeon’s recommendations.  If you have had a shoulder/arm block, it is a good idea to sleep on a recliner with pillows under your arm.    You may experience symptoms such as:  Shortness of breath  Hoarseness   Blurry vision  Unequal pupils  Drooping of your face on the same side as the block was performed    These are side effects associated with this kind of block and should go away within 12 hours.    Note: If you have severe or prolonged shortness of breath, please seek medical assistance as soon as possible.      Protection of a “blocked” arm or leg (limb)  After a nerve block, you cannot feel pain, pressure, or extremes of temperature in the affected limb. And because of this, your blocked limb is at more risk for injury. For example, it is possible to burn your limb on an extremely hot surface without feeling it.     When resting, it is important to reposition your limb periodically to avoid prolonged pressure on it. This may require the use of pillows and padding.    While sleeping, you should avoid rolling onto the affected limb or putting too much pressure on it.     If you have a cast or tight dressing, check the color of your fingers or toes of the affected limb. Call your surgeon if they look discolored (that is, dusky, dark colored).    Use caution in cold weather. Cover your limb appropriately to protect it from the cold.      Pain Management:    Your surgeon will give you a prescription for pain medication. Begin taking this before the nerve block wears off. Bear in mind that sometimes the block can wear off in the middle of the night.

## 2022-12-27 NOTE — BRIEF OP NOTE
SHOULDER ARTHROSCOPY WITH ROTATOR CUFF REPAIR  Progress Note    Federico Angeles  12/27/2022    Pre-op Diagnosis:   Chronic left shoulder pain [M25.512, G89.29]       Post-Op Diagnosis Codes:     * Chronic left shoulder pain [M25.512, G89.29]    Procedure/CPT® Codes:        Procedure(s):  SHOULDER ARTHROSCOPY WITH ROTATOR CUFF REPAIR WITH BICEP TENODESIS        Surgeon(s):  Jackson Moarlez MD    Anesthesia: General with Block    Staff:   Circulator: Lisseth Bond RN; Yossi Kenney RN  Scrub Person: Mimi Seay  Vendor Representative: Viktoriya Seo Michael  Assistant: Nyla Yepez APRN  Assistant: Nyla Yepez APRN      Estimated Blood Loss: 20 mL    Urine Voided: * No values recorded between 12/27/2022  8:12 AM and 12/27/2022  9:30 AM *    Specimens:                None          Drains: * No LDAs found *    Findings: see dictation        Complications: none    Assistant: Nyla Yepez APRN  was responsible for performing the following activities: Retraction and their skilled assistance was necessary for the success of this case.    Jackson Moralez MD     Date: 12/27/2022  Time: 09:39 EST

## 2022-12-28 ENCOUNTER — TELEPHONE (OUTPATIENT)
Dept: ORTHOPEDIC SURGERY | Facility: CLINIC | Age: 67
End: 2022-12-28

## 2022-12-28 NOTE — TELEPHONE ENCOUNTER
Postop follow-up call.  I spoke to Mr. Angeles.  Reports he is still having some urinary frequency and urgency, but he has not restarted his Flomax yet.  He had to attend a  this morning, but says he will start the Flomax as soon as he gets home.  Reports his nerve block is still partially in effect.  I instructed him to postpone pendulums until he is full sensation return to the entire arm.  We discussed other postop care instructions.  He verbalized understanding of all we discussed and appreciated the call.

## 2023-01-04 ENCOUNTER — OFFICE VISIT (OUTPATIENT)
Dept: ORTHOPEDIC SURGERY | Facility: CLINIC | Age: 68
End: 2023-01-04
Payer: COMMERCIAL

## 2023-01-04 VITALS — BODY MASS INDEX: 29.17 KG/M2 | WEIGHT: 220.1 LBS | TEMPERATURE: 97.8 F | HEIGHT: 73 IN

## 2023-01-04 DIAGNOSIS — Z09 SURGERY FOLLOW-UP: Primary | ICD-10-CM

## 2023-01-04 PROCEDURE — 1160F RVW MEDS BY RX/DR IN RCRD: CPT | Performed by: ORTHOPAEDIC SURGERY

## 2023-01-04 PROCEDURE — 99024 POSTOP FOLLOW-UP VISIT: CPT | Performed by: ORTHOPAEDIC SURGERY

## 2023-01-04 PROCEDURE — 1159F MED LIST DOCD IN RCRD: CPT | Performed by: ORTHOPAEDIC SURGERY

## 2023-01-04 NOTE — PROGRESS NOTES
Federico Angeles : 1955 MRN: [id number] DATE: 2023    CC: 1 week s/p left shoulder rotator cuff repair, biceps tenodesis    HPI: Patient returns to clinic today for follow up.  He had post op urinary retention but it is now resolved.  Reports pain is well controlled.  Denies fevers, drainage, redness or other concerning symptoms.  Reports compliance with use of the sling.    Vitals:    23 1549   Temp: 97.8 °F (36.6 °C)     Exam:  Wounds appear well-approximated.  Arm and forearm soft.  Shoulder moves fluidly with pendulums.  Good motor and sensory function in the hand and wrist.  Palpable radial pulse with brisk capillary refill    Impression:  1 week s/p left shoulder rotator cuff repair, biceps tenodesis    Plan:    1.  Begin PT per protocol--prescription given as well as 2 copies of my protocol.  2.  Continue shoulder immobilizer.  Instructions about the importance of compliance with this were carefully discussed.  3.  Follow up in 5 weeks   4.  Counseled the patient about appropriate activity modifications and restrictions, including no driving at this point.    Jackson Moralez MD

## 2023-02-02 ENCOUNTER — TELEPHONE (OUTPATIENT)
Dept: ORTHOPEDIC SURGERY | Facility: CLINIC | Age: 68
End: 2023-02-02

## 2023-02-02 NOTE — TELEPHONE ENCOUNTER
"    Caller: SAL \"SAMANTHA\" LEXX     Relationship to patient: PATIENT     Best call back number: [phone]    Chief complaint: 6 WK POST OP (SX 12/27/22)/LT SHOULDER    Type of visit: POST OP     Requested date:02/08/23 CLOSER TO TIMEFRAME OF 11:10 AM     If rescheduling, when is the original appointment: 02/08/23 AT 03:50 PM     Additional notes: PATIENT'S WIFE IS SCHEDULED WITH DR KRUSE 02/08/23 [place] AT 11:10.  PATIENT REQUESTED TO COME IN AND SEE DR KRUSE AT THE SAME TIME AS HIS WIFE SINCE HE JUST HAS A \"QUICK\" FOLLOW UP.  IF THIS IS NOT POSSIBLE, PATIENT WOULD LIKE TO BE RESCHEDULE WITH ASAEL HENDERSON IN CLOSER PROXIMITY TO HIS WIFE'S APPOINTMENT PLEASE.            "

## 2023-02-08 ENCOUNTER — OFFICE VISIT (OUTPATIENT)
Dept: ORTHOPEDIC SURGERY | Facility: CLINIC | Age: 68
End: 2023-02-08
Payer: COMMERCIAL

## 2023-02-08 VITALS — BODY MASS INDEX: 29.1 KG/M2 | HEIGHT: 73 IN | TEMPERATURE: 97.4 F | WEIGHT: 219.6 LBS

## 2023-02-08 DIAGNOSIS — Z09 SURGERY FOLLOW-UP: Primary | ICD-10-CM

## 2023-02-08 PROCEDURE — 99024 POSTOP FOLLOW-UP VISIT: CPT | Performed by: NURSE PRACTITIONER

## 2023-02-08 NOTE — PROGRESS NOTES
Federico Angeles : 1955 MRN: [id number] DATE: 2023    CC: 6 weeks s/p left shoulder rotator cuff repair, biceps tenodesis    HPI: Pt. returns to clinic today for follow up.  Reports pain is well controlled.  Reports compliance with use of the sling and physical therapy.  Reports he has a different therapist during his last session and experienced increased pain in the biceps region for approximately 4 days.  He has resumed home exercises and says his pain is back to his normal post-op pain.  He has another PT appointment in 2 days.  Overall, he feels he is doing well and progressing nicely.     Vitals:    23 1102   Temp: 97.4 °F (36.3 °C)       Exam:  Wounds appear well-healed.  Arm and forearm soft.  Shoulder moves fluidly and motion is on track per protocol.  Good motor and sensory function distally.  Palpable radial pulse with good cap refill.      Impression:  6 weeks s/p left shoulder rotator cuff repair, biceps tenodesis    Plan:    1.  Continue PT per protocol.  2.  Discontinue shoulder immobilizer and begin working on progressive ROM per protocol.  3.  Follow up in 6 weeks with Dr. Moralez.   4.  Counseled the patient about appropriate activity modifications and restrictions.  Released to drive at this point.    MARLENY Johnson     2023

## 2023-02-22 ENCOUNTER — TELEPHONE (OUTPATIENT)
Dept: ORTHOPEDIC SURGERY | Facility: CLINIC | Age: 68
End: 2023-02-22
Payer: COMMERCIAL

## 2023-02-22 NOTE — TELEPHONE ENCOUNTER
----- Message from Tati Li MA sent at 2/22/2023  4:08 PM EST -----  Regarding: FW: Bicep & forearm pain   Contact: [phone]    ----- Message -----  From: Federico Angeles  Sent: 2/22/2023   3:55 PM EST  To: [facility]  Subject: Bicep & forearm pain                             I continue to have bicep and inside forearm pain but primarily at night or sitting. Hard sleeping. I've tried ice and ibuprofen.  Not sure what's causing this.  I've even used Mohini's pain cream that did help. I don't notice it much at all while walking or standing. HELP!

## 2023-02-22 NOTE — TELEPHONE ENCOUNTER
I spoke to Mr. Angeles.  Reports he continues to have biceps and forearm pain which has persisted since his surgery.  Denies pain in the elbow.  Denies numbness or tingling in his hand.  I have given him a prescription for a pain/anti-inflammatory cream through [facility].  The risks were discussed.  I offered him a clinic appointment for evaluation, but he declined for now.  He said his shoulder seems to be doing well and he is progressing in PT.  He would like to give the cream a try first.  He will call back if he decides to be seen in clinic.

## 2023-03-07 ENCOUNTER — OFFICE VISIT (OUTPATIENT)
Dept: INTERNAL MEDICINE | Facility: CLINIC | Age: 68
End: 2023-03-07
Payer: COMMERCIAL

## 2023-03-07 VITALS
HEART RATE: 67 BPM | SYSTOLIC BLOOD PRESSURE: 122 MMHG | DIASTOLIC BLOOD PRESSURE: 76 MMHG | TEMPERATURE: 98 F | HEIGHT: 73 IN | OXYGEN SATURATION: 98 % | WEIGHT: 223.8 LBS | BODY MASS INDEX: 29.66 KG/M2

## 2023-03-07 DIAGNOSIS — F41.9 ANXIETY: ICD-10-CM

## 2023-03-07 DIAGNOSIS — I10 HYPERTENSION, UNSPECIFIED TYPE: Primary | ICD-10-CM

## 2023-03-07 PROCEDURE — 99214 OFFICE O/P EST MOD 30 MIN: CPT | Performed by: INTERNAL MEDICINE

## 2023-03-07 RX ORDER — HYDROCODONE BITARTRATE AND ACETAMINOPHEN 7.5; 325 MG/1; MG/1
1 TABLET ORAL 3 TIMES DAILY
COMMUNITY
Start: 2022-05-31

## 2023-03-07 RX ORDER — METOPROLOL SUCCINATE 50 MG/1
50 TABLET, EXTENDED RELEASE ORAL DAILY
Qty: 90 TABLET | Refills: 1 | Status: SHIPPED | OUTPATIENT
Start: 2023-03-07

## 2023-03-07 RX ORDER — HYDROXYZINE HYDROCHLORIDE 25 MG/1
25 TABLET, FILM COATED ORAL 3 TIMES DAILY PRN
Qty: 60 TABLET | Refills: 2 | Status: SHIPPED | OUTPATIENT
Start: 2023-03-07

## 2023-03-07 RX ORDER — TADALAFIL 20 MG/1
1 TABLET ORAL DAILY PRN
COMMUNITY
Start: 2023-01-27

## 2023-03-07 NOTE — PROGRESS NOTES
Subjective   Federico Angeles is a 67 y.o. male here today to discuss insomnia, HTN and anxiety.      History of Present Illness   S/p left shoulder sx.    He has been having some anxiety since then  No tachycardia  He said his bp has been elevated   He has increased hois toprol 50mg a day  brokjen up to bi and he is doing better  He does occas still have some shoulder pain in the left arm  He has had a hard time sleeping due to this  He struggles with staying asleep  He has tried deep sleep and melatonin and trazodone    The following portions of the patient's history were reviewed and updated as appropriate: allergies, current medications, past medical history, past social history and problem list.  No tob no etoh  Review of Systems   Respiratory: Negative for shortness of breath.    Cardiovascular: Negative for chest pain and palpitations.   Musculoskeletal: Negative for neck pain.   Neurological: Negative for headaches.       Objective   Physical Exam  Vitals reviewed.   Constitutional:       Appearance: He is well-developed.   HENT:      Head: Normocephalic and atraumatic.      Right Ear: External ear normal.      Left Ear: External ear normal.   Eyes:      Conjunctiva/sclera: Conjunctivae normal.      Pupils: Pupils are equal, round, and reactive to light.   Neck:      Thyroid: No thyromegaly.      Trachea: No tracheal deviation.   Cardiovascular:      Rate and Rhythm: Normal rate and regular rhythm.      Heart sounds: Normal heart sounds.   Pulmonary:      Effort: Pulmonary effort is normal.      Breath sounds: Normal breath sounds.   Abdominal:      General: Bowel sounds are normal. There is no distension.      Palpations: Abdomen is soft.      Tenderness: There is no abdominal tenderness.   Musculoskeletal:         General: No deformity. Normal range of motion.      Cervical back: Normal range of motion.   Skin:     General: Skin is warm and dry.   Neurological:      Mental Status: He is alert and oriented to  person, place, and time.   Psychiatric:         Behavior: Behavior normal.         Thought Content: Thought content normal.         Judgment: Judgment normal.         Vitals:    03/07/23 0820   BP: 122/76   Pulse: 67   Temp: 98 °F (36.7 °C)   SpO2: 98%     Body mass index is 29.53 kg/m².       Current Outpatient Medications:   •  cetirizine (zyrTEC) 10 MG tablet, Take 1 tablet by mouth Daily., Disp: , Rfl:   •  docusate sodium (COLACE) 100 MG capsule, Take 1 capsule by mouth 2 (Two) Times a Day., Disp: 60 capsule, Rfl: 0  •  HYDROcodone-acetaminophen (NORCO) 7.5-325 MG per tablet, Take 1 tablet by mouth 3 (Three) Times a Day., Disp: , Rfl:   •  tadalafil (CIALIS) 20 MG tablet, Take 1 tablet by mouth Daily As Needed., Disp: , Rfl:   •  tamsulosin (FLOMAX) 0.4 MG capsule 24 hr capsule, Take 1 capsule by mouth Daily., Disp: , Rfl:   •  Unable to find, compounded cream, Disp: , Rfl:   •  hydrOXYzine (ATARAX) 25 MG tablet, Take 1 tablet by mouth 3 (Three) Times a Day As Needed for Anxiety., Disp: 60 tablet, Rfl: 2  •  metoprolol succinate XL (Toprol XL) 50 MG 24 hr tablet, Take 1 tablet by mouth Daily., Disp: 90 tablet, Rfl: 1      Assessment & Plan   Diagnoses and all orders for this visit:    1. Hypertension, unspecified type (Primary)    2. Anxiety    Other orders  -     metoprolol succinate XL (Toprol XL) 50 MG 24 hr tablet; Take 1 tablet by mouth Daily.  Dispense: 90 tablet; Refill: 1  -     hydrOXYzine (ATARAX) 25 MG tablet; Take 1 tablet by mouth 3 (Three) Times a Day As Needed for Anxiety.  Dispense: 60 tablet; Refill: 2    1. HTn- increase the toprol cxl to 50mg at night  2.  Anxiety-  Mostly at night  Bother sleep  We will try hydroxyzine and sleep trouble  Try the 25mg and see how this doesrxoy

## 2023-03-29 ENCOUNTER — OFFICE VISIT (OUTPATIENT)
Dept: ORTHOPEDIC SURGERY | Facility: CLINIC | Age: 68
End: 2023-03-29
Payer: COMMERCIAL

## 2023-03-29 VITALS — WEIGHT: 221.4 LBS | TEMPERATURE: 97.4 F | HEIGHT: 73 IN | BODY MASS INDEX: 29.34 KG/M2

## 2023-03-29 DIAGNOSIS — Z09 SURGERY FOLLOW-UP: Primary | ICD-10-CM

## 2023-03-29 PROCEDURE — 99024 POSTOP FOLLOW-UP VISIT: CPT | Performed by: ORTHOPAEDIC SURGERY

## 2023-03-29 NOTE — PROGRESS NOTES
Federico Angeles : 1955 MRN: [id number] DATE: 2023     CC: 3 months s/p left shoulder arthroscopy with rotator cuff repair, biceps tenodesis    HPI: Pt. returns to clinic today for follow up.  Patient reports significant improvement since last visit.  Motion is steadily getting better with therapy.  Pain is fairly minimal at this point.  No complaints.    Vitals:    23 1325   Temp: 97.4 °F (36.3 °C)       Exam:  Wounds appear well-healed.  Arm and forearm soft.  Shoulder motion is improved: patient is still lacking several levels of internal rotation but elevation, abduction and external rotation are all within 5 to 10 degrees of the contralateral side.  Good motor and sensory function in the hand and wrist.  Palpable radial pulse with good cap refill.      Impression: 3-months s/p left shoulder rotator cuff repair, biceps tenodesis    Plan:    1.  Continue PT per protocol.  2.  Can begin strengthening program at this time  3.  Follow up in 6 weeks for reevaluation  4.  Counseled the patient about appropriate activity modifications and restrictions.    Jackson Moralez MD    2023

## 2023-04-27 NOTE — PROGRESS NOTES
Patient ID: Federico Angeles is a 67 y.o. male is being seen for consultation today at the request of Mj Shukla MD diagnosis Disc degeneration, lumbar to follow up with Dr. Ford in 3 months.    Today, Federico Angeles reports lower back pain on mostly on left side without radiating pain, numbness and tingling down bilateral legs. He denies loss of bowel/bladder incontinence.  He reports no use of ice. He reports physical therapy made the pain worse.  He reports epidural injection with no relief.    Subjective     The patient is here in regards to   Chief Complaint   Patient presents with   • Back Pain   • Leg Pain       History of Present Illness  Dae has been followed by Dr. Shukla for longstanding low back pain due to degenerative disc disease.  He is currently on disability and previously worked as an  for [employer].  He describes pain that is worse in the morning and presents his back stiffness that gradually gets better throughout the day.  He sees pain management as prescribed narcotics which he takes infrequently.  He had a right-sided shoulder surgery and then a left-sided shoulder surgery and is still taking pain medications for that issue.  He last had an MRI of the lumbar spine in 2019 and had an x-ray performed just prior to Dr. [facility]'s prison in September 2022.  He has tried facet injections and blocks in the past as well as epidural steroid injections which have not been effective for him.      While in the room and during my examination of the patient I wore a mask and eye protection.  I washed my hands before and after this patient encounter.  The patient was also wearing a mask.    The following portions of the patient's history were reviewed and updated as appropriate: allergies, current medications, past family history, past medical history, past social history, past surgical history and problem list.    Review of Systems     Past Medical History:   Diagnosis Date   • Anxiety End  of Jan 2023    Following shoulder surgery and difficulty sleeping   • Chronic left shoulder pain    • Enlarged prostate    • Generalized headaches     HISTORY OF   • History of hepatitis    • Hypertension    • Insomnia    • Leg cramps    • Low back pain 2016    Lower back pain for years-FOLLOWED BY PAIN MANAGEMENT   • PONV (postoperative nausea and vomiting)    • Raynauds disease    • Tinnitus     BILATERAL       No Known Allergies    Family History   Problem Relation Age of Onset   • Heart attack Father 55        drinker   • Liver disease Father    • Alcohol abuse Father    • No Known Problems Sister    • No Known Problems Brother    • Malig Hyperthermia Neg Hx        Social History     Socioeconomic History   • Marital status:      Spouse name: PAT HALLMAN   • Number of children: 2   Tobacco Use   • Smoking status: Never   • Smokeless tobacco: Never   • Tobacco comments:     Only as teen   Vaping Use   • Vaping Use: Never used   Substance and Sexual Activity   • Alcohol use: No   • Drug use: No   • Sexual activity: Yes     Partners: Female     Birth control/protection: None       Past Surgical History:   Procedure Laterality Date   • NECK SURGERY  1979 and 1989   • SHOULDER ARTHROSCOPY Right 2/8/2022    Procedure: SHOULDER ARTHROSCOPY , DISTAL CLAVICLE EXCISION,  BICEPS TENODESIS , ROTATATOR CUFF REPAIR;  Surgeon: Jackson Moralez MD;  Location: [facility] OR [facility] [place];  Service: Orthopedics;  Laterality: Right;   • SHOULDER ARTHROSCOPY W/ ROTATOR CUFF REPAIR Left 12/27/2022    Procedure: SHOULDER ARTHROSCOPY WITH ROTATOR CUFF REPAIR WITH BICEP TENODESIS;  Surgeon: Jackson Moralez MD;  Location: [facility] OR [facility] [place];  Service: Orthopedics;  Laterality: Left;         Objective     Vitals:    05/03/23 0941   BP: 128/84   Pulse: 75   Temp: 97.9 °F (36.6 °C)   SpO2: 98%     Body mass index is 29.16 kg/m².    Physical Exam  Constitutional:       Appearance: Normal appearance.   HENT:      Head: Normocephalic and atraumatic.    Eyes:      Extraocular Movements: Extraocular movements intact.      Conjunctiva/sclera: Conjunctivae normal.      Pupils: Pupils are equal, round, and reactive to light.   Cardiovascular:      Rate and Rhythm: Normal rate and regular rhythm.      Pulses: Normal pulses.   Pulmonary:      Breath sounds: Normal breath sounds.   Abdominal:      Palpations: Abdomen is soft.   Musculoskeletal:         General: Normal range of motion.      Cervical back: Normal range of motion and neck supple.   Skin:     General: Skin is warm and dry.   Neurological:      Mental Status: He is alert and oriented to person, place, and time.      Cranial Nerves: Cranial nerves 2-12 are intact.      Motor: Motor function is intact. No weakness or atrophy.      Coordination: Coordination is intact. Romberg sign negative. Romberg Test normal.      Gait: Gait is intact. Gait normal.      Deep Tendon Reflexes: Reflexes are normal and symmetric.      Reflex Scores:       Tricep reflexes are 2+ on the right side and 2+ on the left side.       Bicep reflexes are 2+ on the right side and 2+ on the left side.       Brachioradialis reflexes are 2+ on the right side and 2+ on the left side.       Patellar reflexes are 2+ on the right side and 2+ on the left side.       Achilles reflexes are 2+ on the right side and 2+ on the left side.  Psychiatric:         Speech: Speech normal.         Neurologic Exam     Mental Status   Oriented to person, place, and time.   Attention: normal. Concentration: normal.   Speech: speech is normal   Level of consciousness: alert    Cranial Nerves   Cranial nerves II through XII intact.     CN III, IV, VI   Pupils are equal, round, and reactive to light.    Motor Exam   Muscle bulk: normal  Overall muscle tone: normal    Strength   Strength 5/5 except as noted.     Sensory Exam   Light touch normal.     Gait, Coordination, and Reflexes     Gait  Gait: normal    Coordination   Romberg: negative    Reflexes   Reflexes 2+  except as noted.   Right brachioradialis: 2+  Left brachioradialis: 2+  Right biceps: 2+  Left biceps: 2+  Right triceps: 2+  Left triceps: 2+  Right patellar: 2+  Left patellar: 2+  Right achilles: 2+  Left achilles: 2+      Assessment & Plan   Independent Review of Radiographic Studies:      I personally reviewed the images from the following studies.    XR: XR of the lumbar spine was reviewed and shows Multiple levels of degenerative disc disease especially at the L5-S1 level.  No significant spondylolisthesis noted on x-ray    Assessment/Plan: Dae has 100% back pain with no radiculopathy or sciatica.  It is possible that he has degenerative disc disease causing back pain in which case I would recommend conservative treatment without surgery.  I do think it is worthwhile to obtain a new MRI of his lumbar spine to evaluate his spinal anatomy.    Medical Decision Making:      MRI of the lumbar spine         Diagnoses and all orders for this visit:    1. Chronic midline low back pain without sciatica (Primary)  -     MRI Lumbar Spine Without Contrast; Future             Patient Instructions/Recommendations:    Follow-up after MRI results.      Rg Ford MD  05/03/23  10:57 EDT

## 2023-05-03 ENCOUNTER — OFFICE VISIT (OUTPATIENT)
Dept: NEUROSURGERY | Facility: CLINIC | Age: 68
End: 2023-05-03
Payer: COMMERCIAL

## 2023-05-03 VITALS
WEIGHT: 221 LBS | SYSTOLIC BLOOD PRESSURE: 128 MMHG | HEART RATE: 75 BPM | BODY MASS INDEX: 29.16 KG/M2 | DIASTOLIC BLOOD PRESSURE: 84 MMHG | TEMPERATURE: 97.9 F | OXYGEN SATURATION: 98 %

## 2023-05-03 DIAGNOSIS — G89.29 CHRONIC MIDLINE LOW BACK PAIN WITHOUT SCIATICA: Primary | ICD-10-CM

## 2023-05-03 DIAGNOSIS — M54.50 CHRONIC MIDLINE LOW BACK PAIN WITHOUT SCIATICA: Primary | ICD-10-CM

## 2023-05-10 ENCOUNTER — OFFICE VISIT (OUTPATIENT)
Dept: ORTHOPEDIC SURGERY | Facility: CLINIC | Age: 68
End: 2023-05-10
Payer: COMMERCIAL

## 2023-05-10 VITALS — TEMPERATURE: 98 F | BODY MASS INDEX: 27.94 KG/M2 | HEIGHT: 73 IN | WEIGHT: 210.8 LBS

## 2023-05-10 DIAGNOSIS — Z09 SURGERY FOLLOW-UP: Primary | ICD-10-CM

## 2023-05-12 DIAGNOSIS — E78.5 HYPERLIPIDEMIA, UNSPECIFIED HYPERLIPIDEMIA TYPE: ICD-10-CM

## 2023-05-12 DIAGNOSIS — I10 HYPERTENSION, UNSPECIFIED TYPE: ICD-10-CM

## 2023-05-16 LAB
ALBUMIN SERPL-MCNC: 4.2 G/DL (ref 3.5–5.2)
ALBUMIN/GLOB SERPL: 1.8 G/DL
ALP SERPL-CCNC: 88 U/L (ref 39–117)
ALT SERPL-CCNC: 34 U/L (ref 1–41)
AST SERPL-CCNC: 30 U/L (ref 1–40)
BASOPHILS # BLD AUTO: 0.05 10*3/MM3 (ref 0–0.2)
BASOPHILS NFR BLD AUTO: 0.7 % (ref 0–1.5)
BILIRUB SERPL-MCNC: 0.4 MG/DL (ref 0–1.2)
BUN SERPL-MCNC: 16 MG/DL (ref 8–23)
BUN/CREAT SERPL: 19.5 (ref 7–25)
CALCIUM SERPL-MCNC: 9.3 MG/DL (ref 8.6–10.5)
CHLORIDE SERPL-SCNC: 108 MMOL/L (ref 98–107)
CHOLEST SERPL-MCNC: 197 MG/DL (ref 0–200)
CO2 SERPL-SCNC: 26.4 MMOL/L (ref 22–29)
CREAT SERPL-MCNC: 0.82 MG/DL (ref 0.76–1.27)
EGFRCR SERPLBLD CKD-EPI 2021: 95.1 ML/MIN/1.73
EOSINOPHIL # BLD AUTO: 0.17 10*3/MM3 (ref 0–0.4)
EOSINOPHIL NFR BLD AUTO: 2.5 % (ref 0.3–6.2)
ERYTHROCYTE [DISTWIDTH] IN BLOOD BY AUTOMATED COUNT: 12.5 % (ref 12.3–15.4)
GLOBULIN SER CALC-MCNC: 2.4 GM/DL
GLUCOSE SERPL-MCNC: 97 MG/DL (ref 65–99)
HCT VFR BLD AUTO: 45.4 % (ref 37.5–51)
HDLC SERPL-MCNC: 40 MG/DL (ref 40–60)
HGB BLD-MCNC: 15.6 G/DL (ref 13–17.7)
IMM GRANULOCYTES # BLD AUTO: 0.03 10*3/MM3 (ref 0–0.05)
IMM GRANULOCYTES NFR BLD AUTO: 0.4 % (ref 0–0.5)
LDLC SERPL CALC-MCNC: 138 MG/DL (ref 0–100)
LDLC/HDLC SERPL: 3.4 {RATIO}
LYMPHOCYTES # BLD AUTO: 3.21 10*3/MM3 (ref 0.7–3.1)
LYMPHOCYTES NFR BLD AUTO: 46.9 % (ref 19.6–45.3)
MCH RBC QN AUTO: 29.7 PG (ref 26.6–33)
MCHC RBC AUTO-ENTMCNC: 34.4 G/DL (ref 31.5–35.7)
MCV RBC AUTO: 86.5 FL (ref 79–97)
MONOCYTES # BLD AUTO: 0.65 10*3/MM3 (ref 0.1–0.9)
MONOCYTES NFR BLD AUTO: 9.5 % (ref 5–12)
NEUTROPHILS # BLD AUTO: 2.73 10*3/MM3 (ref 1.7–7)
NEUTROPHILS NFR BLD AUTO: 40 % (ref 42.7–76)
NRBC BLD AUTO-RTO: 0.1 /100 WBC (ref 0–0.2)
PLATELET # BLD AUTO: 263 10*3/MM3 (ref 140–450)
POTASSIUM SERPL-SCNC: 4.9 MMOL/L (ref 3.5–5.2)
PROT SERPL-MCNC: 6.6 G/DL (ref 6–8.5)
PSA SERPL-MCNC: 1.11 NG/ML (ref 0–4)
RBC # BLD AUTO: 5.25 10*6/MM3 (ref 4.14–5.8)
SODIUM SERPL-SCNC: 143 MMOL/L (ref 136–145)
TRIGL SERPL-MCNC: 105 MG/DL (ref 0–150)
TSH SERPL DL<=0.005 MIU/L-ACNC: 1.91 UIU/ML (ref 0.27–4.2)
VLDLC SERPL CALC-MCNC: 19 MG/DL (ref 5–40)
WBC # BLD AUTO: 6.84 10*3/MM3 (ref 3.4–10.8)

## 2023-05-26 ENCOUNTER — OFFICE VISIT (OUTPATIENT)
Dept: INTERNAL MEDICINE | Facility: CLINIC | Age: 68
End: 2023-05-26
Payer: COMMERCIAL

## 2023-05-26 VITALS
DIASTOLIC BLOOD PRESSURE: 72 MMHG | SYSTOLIC BLOOD PRESSURE: 110 MMHG | HEIGHT: 73 IN | TEMPERATURE: 98.2 F | OXYGEN SATURATION: 98 % | BODY MASS INDEX: 28.85 KG/M2 | HEART RATE: 64 BPM | WEIGHT: 217.7 LBS

## 2023-05-26 DIAGNOSIS — I10 HYPERTENSION, UNSPECIFIED TYPE: ICD-10-CM

## 2023-05-26 DIAGNOSIS — Z00.00 HEALTH CARE MAINTENANCE: Primary | ICD-10-CM

## 2023-05-26 DIAGNOSIS — M25.512 CHRONIC LEFT SHOULDER PAIN: ICD-10-CM

## 2023-05-26 DIAGNOSIS — G89.29 CHRONIC LEFT SHOULDER PAIN: ICD-10-CM

## 2023-05-26 DIAGNOSIS — F41.9 ANXIETY: ICD-10-CM

## 2023-05-26 PROBLEM — M54.50 LOW BACK PAIN: Status: RESOLVED | Noted: 2023-05-03 | Resolved: 2023-05-26

## 2023-05-26 NOTE — PROGRESS NOTES
"Subjective   Federico Angeles is a 67 y.o. male and is here for a comprehensive physical exam. The patient reports problems - left shoulder sx.  He is doing well with left shoulder sx  Pt has been taking BP meds as prescribed without any problems.  No HA  No episodes of orthostasis      Do you take any herbs or supplements that were not prescribed by a doctor? No tob no etoh      Social History: he stay active  Social History     Socioeconomic History   • Marital status:      Spouse name: PAT ANGELES   • Number of children: 2   Tobacco Use   • Smoking status: Never   • Smokeless tobacco: Never   • Tobacco comments:     Only as teen   Vaping Use   • Vaping Use: Never used   Substance and Sexual Activity   • Alcohol use: No   • Drug use: No   • Sexual activity: Yes     Partners: Female     Birth control/protection: None       Family History:   Family History   Problem Relation Age of Onset   • Heart attack Father 55        drinker   • Liver disease Father    • Alcohol abuse Father    • No Known Problems Sister    • No Known Problems Brother    • Malig Hyperthermia Neg Hx        Past Medical History:   Past Medical History:   Diagnosis Date   • Anxiety End of Jan 2023    Following shoulder surgery and difficulty sleeping   • Chronic left shoulder pain    • Enlarged prostate    • Generalized headaches     HISTORY OF   • History of hepatitis    • Hypertension    • Insomnia    • Leg cramps    • Low back pain 2016    Lower back pain for years-FOLLOWED BY PAIN MANAGEMENT   • PONV (postoperative nausea and vomiting)    • Raynauds disease    • Tinnitus     BILATERAL           Review of Systems    A comprehensive review of systems was negative.    Objective   /72   Pulse 64   Temp 98.2 °F (36.8 °C)   Ht 185.4 cm (72.99\")   Wt 98.7 kg (217 lb 11.2 oz)   SpO2 98%   BMI 28.73 kg/m²     General Appearance:    Alert, cooperative, no distress, appears stated age   Head:    Normocephalic, without obvious " abnormality, atraumatic   Eyes:    PERRL, conjunctiva/corneas clear, EOM's intact, fundi     benign, both eyes        Ears:    Normal TM's and external ear canals, both ears   Nose:   Nares normal, septum midline, mucosa normal, no drainage    or sinus tenderness   Throat:   Lips, mucosa, and tongue normal; teeth and gums normal   Neck:   Supple, symmetrical, trachea midline, no adenopathy;        thyroid:  No enlargement/tenderness/nodules; no carotid    bruit or JVD   Back:     Symmetric, no curvature, ROM normal, no CVA tenderness   Lungs:     Clear to auscultation bilaterally, respirations unlabored   Chest wall:    No tenderness or deformity   Heart:    Regular rate and rhythm, S1 and S2 normal, no murmur, rub   or gallop   Abdomen:     Soft, non-tender, bowel sounds active all four quadrants,     no masses, no organomegaly           Extremities:   Extremities normal, atraumatic, no cyanosis or edema   Pulses:   2+ and symmetric all extremities   Skin:   Skin color, texture, turgor normal, no rashes or lesions   Lymph nodes:   Cervical, supraclavicular, and axillary nodes normal   Neurologic:   CNII-XII intact. Normal strength, sensation and reflexes       throughout       Vitals:    05/26/23 0939   BP: 110/72   Pulse: 64   Temp: 98.2 °F (36.8 °C)   SpO2: 98%     Body mass index is 28.73 kg/m².      Medications:   Current Outpatient Medications:   •  cetirizine (zyrTEC) 10 MG tablet, Take 1 tablet by mouth Daily., Disp: , Rfl:   •  HYDROcodone-acetaminophen (NORCO) 7.5-325 MG per tablet, Take 1 tablet by mouth 3 (Three) Times a Day., Disp: , Rfl:   •  hydrOXYzine (ATARAX) 25 MG tablet, Take 1 tablet by mouth 3 (Three) Times a Day As Needed for Anxiety., Disp: 60 tablet, Rfl: 2  •  metoprolol succinate XL (Toprol XL) 50 MG 24 hr tablet, Take 1 tablet by mouth Daily., Disp: 90 tablet, Rfl: 1  •  tadalafil (CIALIS) 20 MG tablet, Take 1 tablet by mouth Daily As Needed., Disp: , Rfl:   •  tamsulosin (FLOMAX) 0.4 MG  capsule 24 hr capsule, Take 1 capsule by mouth Daily., Disp: , Rfl:   •  Unable to find, compounded cream, Disp: , Rfl:     BMI is >= 25 and <30. (Overweight) The following options were offered after discussion;: exercise counseling/recommendations and nutrition counseling/recommendations        Assessment & Plan   Healthy male exam.      1. Healthcare Maintenance:  2. Patient Counseling:  --Nutrition: Stressed importance of moderation in sodium/caffeine intake, saturated fat and cholesterol, caloric balance, sufficient intake of fresh fruits, vegetables, fiber, calcium and vit D  --Exercise: stays active  Getting back on the back  --Substance Abuse: no tob no etoh  --Dental health: he does go to the dentist reg  --Immunizations reviewed.utd with vaccines  --Discussed benefits of screening colonoscopy.  3.  HTN-  Ok with metoprolol

## 2023-06-14 ENCOUNTER — TELEPHONE (OUTPATIENT)
Dept: NEUROSURGERY | Facility: CLINIC | Age: 68
End: 2023-06-14

## 2023-06-14 NOTE — TELEPHONE ENCOUNTER
Spoke with patient, he informed me that he was able to get his MRI at [facility]. We scheduled him a follow up with Dr. Ford, informed patient to bring disc.

## 2023-08-16 RX ORDER — METOPROLOL SUCCINATE 50 MG/1
TABLET, EXTENDED RELEASE ORAL
Qty: 90 TABLET | Refills: 3 | Status: SHIPPED | OUTPATIENT
Start: 2023-08-16

## 2023-10-09 ENCOUNTER — TELEPHONE (OUTPATIENT)
Dept: NEUROSURGERY | Facility: OTHER | Age: 68
End: 2023-10-09

## 2023-10-09 ENCOUNTER — TELEPHONE (OUTPATIENT)
Dept: INTERNAL MEDICINE | Facility: CLINIC | Age: 68
End: 2023-10-09

## 2023-10-09 NOTE — TELEPHONE ENCOUNTER
"  Caller: Federico Angeles \"Dae\"    Relationship: Self    Best call back number: [phone]*    What is the best time to reach you: ANYTIME    Who are you requesting to speak with (clinical staff, provider,  specific staff member): CLINICAL    What was the call regarding: PATIENT REQUESTING A CALL BACK REGARDING QUESTIONS ABOUT DISABILITY PAPERWORK.    Is it okay if the provider responds through MyChart: NO        "

## 2023-10-09 NOTE — TELEPHONE ENCOUNTER
"Caller: Federico Angeles \"Dae\"    Relationship: Self    Best call back number: [phone]    What form or medical record are you requesting: DISABILITY LETTER    Who is requesting this form or medical record from you: DISABILITY    How would you like to receive the form or medical records (pick-up, mail, fax): MAIL  If mail, what is the address: [address] [place], [place] [address]       Timeframe paperwork needed: ASAP    Additional notes: PATIENT STATED DISABILITY IS NEEDING A LETTER FROM DR GARAY JUST INFORMING THE MRI RESULTS- HIS OPINION ON PATIENTS BACK AND ABILITY TO WORK- PLEASE ADVISE- THANK YOU        "

## 2023-10-10 ENCOUNTER — TELEPHONE (OUTPATIENT)
Dept: NEUROSURGERY | Facility: CLINIC | Age: 68
End: 2023-10-10
Payer: COMMERCIAL

## 2023-10-10 ENCOUNTER — OFFICE VISIT (OUTPATIENT)
Dept: INTERNAL MEDICINE | Facility: CLINIC | Age: 68
End: 2023-10-10
Payer: COMMERCIAL

## 2023-10-10 VITALS
DIASTOLIC BLOOD PRESSURE: 76 MMHG | HEIGHT: 73 IN | TEMPERATURE: 98.1 F | SYSTOLIC BLOOD PRESSURE: 114 MMHG | HEART RATE: 64 BPM | OXYGEN SATURATION: 97 % | BODY MASS INDEX: 28.91 KG/M2 | WEIGHT: 218.1 LBS

## 2023-10-10 DIAGNOSIS — M51.36 DDD (DEGENERATIVE DISC DISEASE), LUMBAR: Primary | ICD-10-CM

## 2023-10-10 DIAGNOSIS — I10 HYPERTENSION, UNSPECIFIED TYPE: ICD-10-CM

## 2023-10-10 PROBLEM — M51.369 DDD (DEGENERATIVE DISC DISEASE), LUMBAR: Status: ACTIVE | Noted: 2023-10-10

## 2023-10-10 PROCEDURE — 99213 OFFICE O/P EST LOW 20 MIN: CPT | Performed by: INTERNAL MEDICINE

## 2023-10-10 NOTE — PROGRESS NOTES
Subjective   Federico Angeles is a 67 y.o. male.   Discussed disability paperwork    Back Pain       Patient has been seeing the neurosurgeon since he injured his back in 2019.  Since that time he has not been able to work.  This is a chronic condition and the neurosurgeon says he was not able to do the job that he had at [employer] specifically as an .  He cannot lift he cannot bend he cannot stand or walk for a long period of time      The following portions of the patient's history were reviewed and updated as appropriate: allergies, current medications, past family history, past medical history, past social history, past surgical history, and problem list.    Review of Systems   Musculoskeletal:  Positive for back pain.       Objective   Physical Exam  Vitals reviewed.   Constitutional:       Appearance: He is well-developed.   HENT:      Head: Normocephalic and atraumatic.      Right Ear: External ear normal.      Left Ear: External ear normal.   Eyes:      Conjunctiva/sclera: Conjunctivae normal.      Pupils: Pupils are equal, round, and reactive to light.   Neck:      Thyroid: No thyromegaly.      Trachea: No tracheal deviation.   Cardiovascular:      Rate and Rhythm: Normal rate and regular rhythm.      Heart sounds: Normal heart sounds.   Pulmonary:      Effort: Pulmonary effort is normal.      Breath sounds: Normal breath sounds.   Abdominal:      General: Bowel sounds are normal. There is no distension.      Palpations: Abdomen is soft.      Tenderness: There is no abdominal tenderness.   Musculoskeletal:         General: No deformity. Normal range of motion.      Cervical back: Normal range of motion.   Skin:     General: Skin is warm and dry.   Neurological:      Mental Status: He is alert and oriented to person, place, and time.   Psychiatric:         Behavior: Behavior normal.         Thought Content: Thought content normal.         Judgment: Judgment normal.         Vitals:    10/10/23 1100    BP: 114/76   Pulse: 64   Temp: 98.1 øF (36.7 øC)   SpO2: 97%     Body mass index is 28.78 kg/mý.         Assessment & Plan   Diagnoses and all orders for this visit:    1. DDD (degenerative disc disease), lumbar (Primary)    2. Hypertension, unspecified type      1.  Degenerative disc disease: I have reviewed the previous notes from Dr. Holland as well as his paperwork that he filled out.  I will continue to do the paperwork as nothing is changed about his condition.  He has an appointment scheduled with pain management in a couple of months.  He will continue to do his stretching exercises and take pain medications as needed.  He usually just takes the hydrocodone at night but if he has been overly active he will take it occasionally during the day.

## 2023-10-10 NOTE — TELEPHONE ENCOUNTER
"CALLED PATIENT PER MESSAGE SENT FROM THE HUB IN REGARDS TO WRITING A DISABILITY LETTER. INFORMED PATIENT PER  AND THE POLICY HE WILL NOT BE ABLE TO WRITE HIM A DISABILITY LETTER BECAUSE THE REQUIREMENTS ARE   1. PATIENT HAS TO HAVE SURGERY. 2. PATIENT CAN ONLY BE OUT OF WORK MAX OF 3 MONTHS. INFORMED PATIENT OF THE RULES. PER  OFFICE VISIT NOTE ON 06/28/2023, \"here is nothing in particular on his MRI that could be addressed as a structural issue that would fix his symptoms.  I did not recommend any surgery for him and I think it is unlikely he will require surgery in the future.  I recommended CBD and physical therapy in addition to weight loss to improve his back pain.\"    "

## 2023-10-18 ENCOUNTER — TELEPHONE (OUTPATIENT)
Dept: INTERNAL MEDICINE | Facility: CLINIC | Age: 68
End: 2023-10-18
Payer: COMMERCIAL

## 2023-10-18 RX ORDER — AZITHROMYCIN 250 MG/1
TABLET, FILM COATED ORAL
Qty: 6 TABLET | Refills: 0 | Status: SHIPPED | OUTPATIENT
Start: 2023-10-18

## 2023-10-18 NOTE — TELEPHONE ENCOUNTER
"----- Message from Ying Villatoro MD sent at 10/18/2023 11:15 AM EDT -----  Regarding: FW: Sinus issues...  Contact: [phone]  Ok to send a zpack  ----- Message -----  From: Sammie Yanes MA  Sent: 10/18/2023  10:37 AM EDT  To: Ying Villatoro MD  Subject: FW: Sinus issues...                                ----- Message -----  From: Federico Angeles \"Dae\"  Sent: 10/18/2023  10:26 AM EDT  To: [facility] [place] [id number] [facility] [place]  Subject: Sinus issues...                                  Carmen and I are in [place] for the next 2 weeks. I've been dealing with drainage and a cough for about a week. Mucus is yellow and sometimes green. Any way to get an z pack or antibiotics sent to local [facility]?  [phone] [address] [place] [address]      "

## 2024-01-11 RX ORDER — HYDROXYZINE HYDROCHLORIDE 25 MG/1
25 TABLET, FILM COATED ORAL 3 TIMES DAILY PRN
Qty: 180 TABLET | Refills: 1 | Status: SHIPPED | OUTPATIENT
Start: 2024-01-11

## 2024-01-16 ENCOUNTER — TRANSCRIBE ORDERS (OUTPATIENT)
Dept: ADMINISTRATIVE | Facility: HOSPITAL | Age: 69
End: 2024-01-16
Payer: COMMERCIAL

## 2024-01-16 DIAGNOSIS — H53.8 VISION BLURRED: Primary | ICD-10-CM

## 2024-01-25 ENCOUNTER — HOSPITAL ENCOUNTER (OUTPATIENT)
Dept: ULTRASOUND IMAGING | Facility: HOSPITAL | Age: 69
Discharge: HOME | End: 2024-01-25
Payer: COMMERCIAL

## 2024-01-25 ENCOUNTER — HOSPITAL ENCOUNTER (OUTPATIENT)
Dept: CT IMAGING | Facility: HOSPITAL | Age: 69
Discharge: HOME | End: 2024-01-25
Payer: COMMERCIAL

## 2024-01-25 DIAGNOSIS — H53.8 VISION BLURRED: ICD-10-CM

## 2024-01-25 LAB — CREAT BLDA-MCNC: 0.7 MG/DL (ref 0.6–1.3)

## 2024-01-25 PROCEDURE — 70470 CT HEAD/BRAIN W/O & W/DYE: CPT

## 2024-01-25 PROCEDURE — 93880 EXTRACRANIAL BILAT STUDY: CPT

## 2024-01-25 PROCEDURE — 82565 ASSAY OF CREATININE: CPT

## 2024-01-25 PROCEDURE — 25510000001 IOPAMIDOL PER 1 ML: Performed by: OPHTHALMOLOGY

## 2024-01-25 RX ADMIN — IOPAMIDOL 100 ML: 755 INJECTION, SOLUTION INTRAVENOUS at 15:40

## 2024-01-26 ENCOUNTER — TELEPHONE (OUTPATIENT)
Dept: INTERNAL MEDICINE | Facility: CLINIC | Age: 69
End: 2024-01-26
Payer: COMMERCIAL

## 2024-01-26 NOTE — TELEPHONE ENCOUNTER
"I spoke with patient he is having absolutely no sinus symptoms so I do believe those findings on the CT scan are incidental.  He is working on diet and exercise we will check cholesterol next time he has some minimal plaques in his carotids at this point a statin would probably be the most helpful thing but he is going to wait till his follow-up appointment in May to discuss further    ----- Message from Sammie Yanes MA sent at 1/26/2024 10:18 AM EST -----  Regarding: FW: Test results on Jan 25  Contact: [phone]  [facility] [employer]  ----- Message -----  From: Federico Angeles \"Dae\"  Sent: 1/26/2024  10:01 AM EST  To: [facility]  Subject: Test results on Jan 25                           Could you have Dr Villatoro review the test results from Jan 25? Ordered by my eye  Spoke with him and saw nothing alarming but referred me to Dr Villatoro. Thanks.    "

## 2024-04-05 ENCOUNTER — TELEPHONE (OUTPATIENT)
Dept: INTERNAL MEDICINE | Facility: CLINIC | Age: 69
End: 2024-04-05
Payer: COMMERCIAL

## 2024-04-05 RX ORDER — BENZONATATE 100 MG/1
100 CAPSULE ORAL 3 TIMES DAILY PRN
Qty: 30 CAPSULE | Refills: 0 | Status: SHIPPED | OUTPATIENT
Start: 2024-04-05

## 2024-04-05 NOTE — TELEPHONE ENCOUNTER
----- Message from Federico Angeles sent at 4/5/2024 11:27 AM EDT -----  Regarding: Cough meds???  Contact: [phone]  [place]. Yes.

## 2024-04-15 ENCOUNTER — OFFICE VISIT (OUTPATIENT)
Dept: ORTHOPEDIC SURGERY | Facility: CLINIC | Age: 69
End: 2024-04-15
Payer: COMMERCIAL

## 2024-04-15 VITALS — BODY MASS INDEX: 27.94 KG/M2 | WEIGHT: 210.8 LBS | HEIGHT: 73 IN | TEMPERATURE: 98.7 F

## 2024-04-15 DIAGNOSIS — M25.512 ACUTE PAIN OF LEFT SHOULDER: Primary | ICD-10-CM

## 2024-04-15 PROCEDURE — 99213 OFFICE O/P EST LOW 20 MIN: CPT | Performed by: NURSE PRACTITIONER

## 2024-04-18 NOTE — PROGRESS NOTES
"Chief Complaint:  Left shoulder pain    HPI:  Mr. Angeles comes in today for evaluation of left shoulder pain.  He had an arthroscopic rotator cuff repair in December 2022.  Reports he recovered with 100% improvement of his symptoms.  Began having some pain and discomfort approximately 1 month ago.  Denies injury or precipitating factors.  Current pain is described as mild to moderate, intermittent, and aching.  Pain is worse at night.  Denies any alleviating factors.    Vitals:    04/15/24 1501   Temp: 98.7 °F (37.1 °C)   TempSrc: Temporal   Weight: 95.6 kg (210 lb 12.8 oz)   Height: 185.4 cm (73\")     Exam: Left shoulder is examined.  Portals are well-healed and benign appearing.  Skin is benign.  No erythema.  No increased warmth.  Shoulder motion is full albeit with some discomfort.  5 out of 5 strength with resistive testing of the rotator cuff.  Good motor and sensory function distally.  Palpable radial pulse.    Imaging:  AP, scapular Y, and axillary views of the left shoulder are ordered by myself and reviewed to evaluate the patient's complaint.  These are compared to previous x-rays.  The x-rays show no obvious acute abnormalities, lesions, masses, significant glenohumeral degenerative changes, or other concerning findings.  The acromiohumeral interval is normal.  Glenoid version appears normal as well.    Assessment:  1.  Acute left shoulder pain, possible bursitis 2.  History of arthroscopic rotator cuff repair, left    Plan: I think the most likely source of his pain today is some bursitis.  I am not concerned about compromise to his rotator cuff repair at this time.  I recommended we treat this conservatively with ice, rest, activity modifications, anti-inflammatories, and Voltaren.  If his symptoms persist or worsen, I am happy to see him back for reevaluation and possible cortisone injection.  Going forward, he will follow-up as needed.    Nyla Yepez, MARLENY  04/15/2024    Much of this encounter note " is an electronic transcription/translation of spoken language to printed text. The electronic translation of spoken language may permit erroneous, or at times, nonsensical words or phrases to be inadvertently transcribed.  Although I have reviewed the note for such errors, some may still exist.

## 2024-05-17 DIAGNOSIS — R35.1 NOCTURIA: ICD-10-CM

## 2024-05-17 DIAGNOSIS — Z00.00 HEALTHCARE MAINTENANCE: Primary | ICD-10-CM

## 2024-05-21 LAB
ALBUMIN SERPL-MCNC: 4 G/DL (ref 3.5–5.2)
ALBUMIN/GLOB SERPL: 1.8 G/DL
ALP SERPL-CCNC: 82 U/L (ref 39–117)
ALT SERPL-CCNC: 13 U/L (ref 1–41)
AST SERPL-CCNC: 17 U/L (ref 1–40)
BASOPHILS # BLD AUTO: 0.04 10*3/MM3 (ref 0–0.2)
BASOPHILS NFR BLD AUTO: 0.7 % (ref 0–1.5)
BILIRUB SERPL-MCNC: 0.5 MG/DL (ref 0–1.2)
BUN SERPL-MCNC: 18 MG/DL (ref 8–23)
BUN/CREAT SERPL: 21.2 (ref 7–25)
CALCIUM SERPL-MCNC: 8.5 MG/DL (ref 8.6–10.5)
CHLORIDE SERPL-SCNC: 108 MMOL/L (ref 98–107)
CHOLEST SERPL-MCNC: 177 MG/DL (ref 0–200)
CO2 SERPL-SCNC: 22.8 MMOL/L (ref 22–29)
CREAT SERPL-MCNC: 0.85 MG/DL (ref 0.76–1.27)
EGFRCR SERPLBLD CKD-EPI 2021: 93.5 ML/MIN/1.73
EOSINOPHIL # BLD AUTO: 0.29 10*3/MM3 (ref 0–0.4)
EOSINOPHIL NFR BLD AUTO: 4.9 % (ref 0.3–6.2)
ERYTHROCYTE [DISTWIDTH] IN BLOOD BY AUTOMATED COUNT: 13 % (ref 12.3–15.4)
GLOBULIN SER CALC-MCNC: 2.2 GM/DL
GLUCOSE SERPL-MCNC: 99 MG/DL (ref 65–99)
HCT VFR BLD AUTO: 46.6 % (ref 37.5–51)
HDLC SERPL-MCNC: 41 MG/DL (ref 40–60)
HGB BLD-MCNC: 15.2 G/DL (ref 13–17.7)
IMM GRANULOCYTES # BLD AUTO: 0.04 10*3/MM3 (ref 0–0.05)
IMM GRANULOCYTES NFR BLD AUTO: 0.7 % (ref 0–0.5)
LDLC SERPL CALC-MCNC: 118 MG/DL (ref 0–100)
LDLC/HDLC SERPL: 2.85 {RATIO}
LYMPHOCYTES # BLD AUTO: 2.1 10*3/MM3 (ref 0.7–3.1)
LYMPHOCYTES NFR BLD AUTO: 35.7 % (ref 19.6–45.3)
MCH RBC QN AUTO: 29.6 PG (ref 26.6–33)
MCHC RBC AUTO-ENTMCNC: 32.6 G/DL (ref 31.5–35.7)
MCV RBC AUTO: 90.7 FL (ref 79–97)
MONOCYTES # BLD AUTO: 0.51 10*3/MM3 (ref 0.1–0.9)
MONOCYTES NFR BLD AUTO: 8.7 % (ref 5–12)
NEUTROPHILS # BLD AUTO: 2.91 10*3/MM3 (ref 1.7–7)
NEUTROPHILS NFR BLD AUTO: 49.3 % (ref 42.7–76)
NRBC BLD AUTO-RTO: 0 /100 WBC (ref 0–0.2)
PLATELET # BLD AUTO: 233 10*3/MM3 (ref 140–450)
POTASSIUM SERPL-SCNC: 4.2 MMOL/L (ref 3.5–5.2)
PROT SERPL-MCNC: 6.2 G/DL (ref 6–8.5)
PSA SERPL-MCNC: 1.1 NG/ML (ref 0–4)
RBC # BLD AUTO: 5.14 10*6/MM3 (ref 4.14–5.8)
SODIUM SERPL-SCNC: 142 MMOL/L (ref 136–145)
TRIGL SERPL-MCNC: 95 MG/DL (ref 0–150)
TSH SERPL DL<=0.005 MIU/L-ACNC: 2.22 UIU/ML (ref 0.27–4.2)
VLDLC SERPL CALC-MCNC: 18 MG/DL (ref 5–40)
WBC # BLD AUTO: 5.89 10*3/MM3 (ref 3.4–10.8)

## 2024-05-31 ENCOUNTER — OFFICE VISIT (OUTPATIENT)
Dept: INTERNAL MEDICINE | Facility: CLINIC | Age: 69
End: 2024-05-31
Payer: COMMERCIAL

## 2024-05-31 VITALS
HEIGHT: 73 IN | TEMPERATURE: 97.7 F | SYSTOLIC BLOOD PRESSURE: 116 MMHG | HEART RATE: 66 BPM | BODY MASS INDEX: 29.16 KG/M2 | DIASTOLIC BLOOD PRESSURE: 76 MMHG | WEIGHT: 220 LBS | OXYGEN SATURATION: 99 %

## 2024-05-31 DIAGNOSIS — Z12.11 SCREENING FOR COLON CANCER: ICD-10-CM

## 2024-05-31 DIAGNOSIS — Z00.00 HEALTH CARE MAINTENANCE: Primary | ICD-10-CM

## 2024-05-31 DIAGNOSIS — G89.29 CHRONIC BILATERAL LOW BACK PAIN WITHOUT SCIATICA: ICD-10-CM

## 2024-05-31 DIAGNOSIS — I10 HYPERTENSION, UNSPECIFIED TYPE: ICD-10-CM

## 2024-05-31 DIAGNOSIS — M54.50 CHRONIC BILATERAL LOW BACK PAIN WITHOUT SCIATICA: ICD-10-CM

## 2024-05-31 PROCEDURE — 99397 PER PM REEVAL EST PAT 65+ YR: CPT | Performed by: INTERNAL MEDICINE

## 2024-05-31 NOTE — PROGRESS NOTES
"Subjective   Federico Angeles is a 68 y.o. male and is here for a comprehensive physical exam. The patient reports problems - worsening back issues .  He says he has been having worsening back pain for the past three weeks   it is now getting better  He does take ibuprofen as needed and occas hydrocodone        Do you take any herbs or supplements that were not prescribed by a doctor? See list      Social History: no tob no etoh  Social History     Socioeconomic History    Marital status:      Spouse name: PAT ANGELES    Number of children: 2   Tobacco Use    Smoking status: Never    Smokeless tobacco: Never    Tobacco comments:     Only as teen rarely   Vaping Use    Vaping status: Never Used   Substance and Sexual Activity    Alcohol use: Never    Drug use: No    Sexual activity: Not Currently     Partners: Female     Birth control/protection: None       Family History:   Family History   Problem Relation Age of Onset    Heart attack Father 55        drinker    Liver disease Father     Alcohol abuse Father     No Known Problems Sister     No Known Problems Brother     Aliyah Hyperthermia Neg Hx        Past Medical History:   Past Medical History:   Diagnosis Date    Anxiety End of Jan 2023    Following shoulder surgery and difficulty sleeping    Chronic left shoulder pain     Enlarged prostate     Generalized headaches     HISTORY OF    History of hepatitis     Hypertension     Insomnia     Leg cramps     Low back pain 2016    Lower back pain for years-FOLLOWED BY PAIN MANAGEMENT    PONV (postoperative nausea and vomiting)     Raynauds disease     Tinnitus     BILATERAL           Review of Systems    Pertinent items are noted in HPI.    Objective   /76 (BP Location: Left arm, Patient Position: Sitting)   Pulse 66   Temp 97.7 °F (36.5 °C) (Oral)   Ht 185.4 cm (73\")   Wt 99.8 kg (220 lb)   SpO2 99%   BMI 29.03 kg/m²     General Appearance:    Alert, cooperative, no distress, appears stated age "   Head:    Normocephalic, without obvious abnormality, atraumatic   Eyes:    PERRL, conjunctiva/corneas clear, EOM's intact, fundi     benign, both eyes        Ears:    Normal TM's and external ear canals, both ears   Nose:   Nares normal, septum midline, mucosa normal, no drainage    or sinus tenderness   Throat:   Lips, mucosa, and tongue normal; teeth and gums normal   Neck:   Supple, symmetrical, trachea midline, no adenopathy;        thyroid:  No enlargement/tenderness/nodules; no carotid    bruit or JVD   Back:     Symmetric, no curvature, ROM normal, no CVA tenderness   Lungs:     Clear to auscultation bilaterally, respirations unlabored   Chest wall:    No tenderness or deformity   Heart:    Regular rate and rhythm, S1 and S2 normal, no murmur, rub   or gallop   Abdomen:     Soft, non-tender, bowel sounds active all four quadrants,     no masses, no organomegaly           Extremities:   Extremities normal, atraumatic, no cyanosis or edema   Pulses:   2+ and symmetric all extremities   Skin:   Skin color, texture, turgor normal, no rashes or lesions   Lymph nodes:   Cervical, supraclavicular, and axillary nodes normal   Neurologic:   CNII-XII intact. Normal strength, sensation and reflexes       throughout       Vitals:    05/31/24 0944   BP: 116/76   Pulse: 66   Temp: 97.7 °F (36.5 °C)   SpO2: 99%     Body mass index is 29.03 kg/m².      Medications:   Current Outpatient Medications:     cetirizine (zyrTEC) 10 MG tablet, Take 1 tablet by mouth Daily., Disp: , Rfl:     HYDROcodone-acetaminophen (NORCO) 7.5-325 MG per tablet, Take 1 tablet by mouth 3 (Three) Times a Day., Disp: , Rfl:     hydrOXYzine (ATARAX) 25 MG tablet, Take 1 tablet by mouth 3 (Three) Times a Day As Needed for Anxiety., Disp: 180 tablet, Rfl: 1    metoprolol succinate XL (TOPROL-XL) 50 MG 24 hr tablet, TAKE 1 TABLET DAILY, Disp: 90 tablet, Rfl: 3    tadalafil (CIALIS) 20 MG tablet, Take 1 tablet by mouth Daily As Needed., Disp: , Rfl:      tamsulosin (FLOMAX) 0.4 MG capsule 24 hr capsule, Take 1 capsule by mouth Daily., Disp: , Rfl:     BMI is >= 25 and <30. (Overweight) The following options were offered after discussion;: exercise counseling/recommendations and nutrition counseling/recommendations        Assessment & Plan   Healthy male exam.      1. Healthcare Maintenance:  2. Patient Counseling:  --Nutrition: Stressed importance of moderation in sodium/caffeine intake, saturated fat and cholesterol, caloric balance, sufficient intake of fresh fruits, vegetables, fiber, calcium and vit D  --Exercise: .  --Substance Abuse:   --Dental health:   --Immunizations reviewed.  --Discussed benefits of screening colonoscopy.  3. HTN-  ok with current meds  4.  BPH-  sees urology  ok with flomax  5.  LBP-  int issues  he has done PT in the past  he is taking occas hydrocodone  6.  HPL- better

## 2024-08-12 RX ORDER — METOPROLOL SUCCINATE 50 MG/1
TABLET, EXTENDED RELEASE ORAL
Qty: 90 TABLET | Refills: 3 | Status: SHIPPED | OUTPATIENT
Start: 2024-08-12

## 2024-09-17 RX ORDER — HYDROXYZINE HYDROCHLORIDE 25 MG/1
TABLET, FILM COATED ORAL
Qty: 180 TABLET | Refills: 1 | Status: SHIPPED | OUTPATIENT
Start: 2024-09-17

## 2024-09-30 ENCOUNTER — PATIENT MESSAGE (OUTPATIENT)
Dept: INTERNAL MEDICINE | Facility: CLINIC | Age: 69
End: 2024-09-30
Payer: COMMERCIAL

## 2024-10-31 ENCOUNTER — OFFICE VISIT (OUTPATIENT)
Dept: INTERNAL MEDICINE | Facility: CLINIC | Age: 69
End: 2024-10-31
Payer: COMMERCIAL

## 2024-10-31 VITALS
TEMPERATURE: 98.1 F | DIASTOLIC BLOOD PRESSURE: 78 MMHG | BODY MASS INDEX: 29.21 KG/M2 | HEART RATE: 61 BPM | OXYGEN SATURATION: 98 % | SYSTOLIC BLOOD PRESSURE: 112 MMHG | HEIGHT: 73 IN | WEIGHT: 220.4 LBS

## 2024-10-31 DIAGNOSIS — M54.50 CHRONIC BILATERAL LOW BACK PAIN WITHOUT SCIATICA: ICD-10-CM

## 2024-10-31 DIAGNOSIS — M51.369 DEGENERATION OF INTERVERTEBRAL DISC OF LUMBAR REGION, UNSPECIFIED WHETHER PAIN PRESENT: ICD-10-CM

## 2024-10-31 DIAGNOSIS — I10 HYPERTENSION, UNSPECIFIED TYPE: Primary | ICD-10-CM

## 2024-10-31 DIAGNOSIS — G89.29 CHRONIC BILATERAL LOW BACK PAIN WITHOUT SCIATICA: ICD-10-CM

## 2024-10-31 PROCEDURE — 99214 OFFICE O/P EST MOD 30 MIN: CPT | Performed by: INTERNAL MEDICINE

## 2024-10-31 RX ORDER — METHYLPREDNISOLONE 4 MG/1
TABLET ORAL
Qty: 21 TABLET | Refills: 0 | Status: SHIPPED | OUTPATIENT
Start: 2024-10-31

## 2024-10-31 NOTE — PROGRESS NOTES
Subjective   Federico Angeles is a 68 y.o. male.   Some acute on chronic back pain      Back Pain       Pain worse since recent trip in   with loading and unloading.  Patient says the pain is mostly on the left side occasionally does radiate.  It does get worse with physical activity and better with rest.  No bladder or bowel incontinence    The following portions of the patient's history were reviewed and updated as appropriate: allergies, current medications, past family history, past medical history, past social history, past surgical history, and problem list.  This is the same pain and limited mobility has now had for several years  Review of Systems   Musculoskeletal:  Positive for back pain.       Objective   Physical Exam  Vitals reviewed.   Constitutional:       Appearance: He is well-developed.   HENT:      Head: Normocephalic and atraumatic.      Right Ear: External ear normal.      Left Ear: External ear normal.   Eyes:      Conjunctiva/sclera: Conjunctivae normal.      Pupils: Pupils are equal, round, and reactive to light.   Neck:      Thyroid: No thyromegaly.      Trachea: No tracheal deviation.   Cardiovascular:      Rate and Rhythm: Normal rate and regular rhythm.      Heart sounds: Normal heart sounds.   Pulmonary:      Effort: Pulmonary effort is normal.      Breath sounds: Normal breath sounds.   Abdominal:      General: Bowel sounds are normal. There is no distension.      Palpations: Abdomen is soft.      Tenderness: There is no abdominal tenderness.   Musculoskeletal:         General: No deformity. Normal range of motion.      Cervical back: Normal range of motion.   Skin:     General: Skin is warm and dry.   Neurological:      Mental Status: He is alert and oriented to person, place, and time.   Psychiatric:         Behavior: Behavior normal.         Thought Content: Thought content normal.         Judgment: Judgment normal.         Vitals:    10/31/24 0900   BP: 112/78   Pulse: 61   Temp:  98.1 °F (36.7 °C)   SpO2: 98%     Body mass index is 29.08 kg/m².         Assessment & Plan   Diagnoses and all orders for this visit:    1. Hypertension, unspecified type (Primary)    2. Degeneration of intervertebral disc of lumbar region, unspecified whether pain present    3. Chronic bilateral low back pain without sciatica    Other orders  -     methylPREDNISolone (MEDROL) 4 MG dose pack; Take as directed on package instructions.  Dispense: 21 tablet; Refill: 0       LBP-  acutely worse- with recent travel.  He has not had steroids in several years we will go ahead and use a Medrol Dosepak he will continue back to his regular stretching and PT exercises.  He takes very few hydrocodone because he does not like the way it makes him feel

## 2025-01-15 RX ORDER — HYDROXYZINE HYDROCHLORIDE 25 MG/1
TABLET, FILM COATED ORAL
Qty: 180 TABLET | Refills: 1 | Status: SHIPPED | OUTPATIENT
Start: 2025-01-15

## 2025-01-30 ENCOUNTER — OFFICE VISIT (OUTPATIENT)
Dept: INTERNAL MEDICINE | Facility: CLINIC | Age: 70
End: 2025-01-30
Payer: COMMERCIAL

## 2025-01-30 VITALS
HEART RATE: 54 BPM | SYSTOLIC BLOOD PRESSURE: 132 MMHG | TEMPERATURE: 98 F | HEIGHT: 73 IN | OXYGEN SATURATION: 98 % | WEIGHT: 222 LBS | DIASTOLIC BLOOD PRESSURE: 82 MMHG | BODY MASS INDEX: 29.42 KG/M2

## 2025-01-30 DIAGNOSIS — I10 HYPERTENSION, UNSPECIFIED TYPE: Primary | ICD-10-CM

## 2025-01-30 PROCEDURE — 99214 OFFICE O/P EST MOD 30 MIN: CPT | Performed by: INTERNAL MEDICINE

## 2025-01-30 RX ORDER — LISINOPRIL 10 MG/1
10 TABLET ORAL DAILY
Qty: 30 TABLET | Refills: 2 | Status: SHIPPED | OUTPATIENT
Start: 2025-01-30

## 2025-01-30 NOTE — PROGRESS NOTES
Subjective   Federico Angeles is a 69 y.o. male.   Fu htn  Hypertension       BP running higher in the evening and he has been taking more metoprolol xl   Chest pain or shortness of breath no orthopnea PND or lower extremity edema.  He does say he gets aggravated a lot and has been doing some work in his garage that makes him irritated and that tends to make his blood pressure go up    The following portions of the patient's history were reviewed and updated as appropriate: allergies, current medications, past family history, past medical history, past social history, past surgical history, and problem list.  Patient denies any significant change in his diet no increase in caffeine no decongestants  Review of Systems    Objective   Physical Exam  Vitals reviewed.   Constitutional:       Appearance: He is well-developed.   HENT:      Head: Normocephalic and atraumatic.      Right Ear: External ear normal.      Left Ear: External ear normal.   Eyes:      Conjunctiva/sclera: Conjunctivae normal.      Pupils: Pupils are equal, round, and reactive to light.   Neck:      Thyroid: No thyromegaly.      Trachea: No tracheal deviation.   Cardiovascular:      Rate and Rhythm: Normal rate and regular rhythm.      Heart sounds: Normal heart sounds.   Pulmonary:      Effort: Pulmonary effort is normal.      Breath sounds: Normal breath sounds.   Abdominal:      General: Bowel sounds are normal. There is no distension.      Palpations: Abdomen is soft.      Tenderness: There is no abdominal tenderness.   Musculoskeletal:         General: No deformity. Normal range of motion.      Cervical back: Normal range of motion.   Skin:     General: Skin is warm and dry.   Neurological:      Mental Status: He is alert and oriented to person, place, and time.   Psychiatric:         Behavior: Behavior normal.         Thought Content: Thought content normal.         Judgment: Judgment normal.         Vitals:    01/30/25 0821   BP: 132/82   Pulse:  54   Temp: 98 °F (36.7 °C)   SpO2: 98%     Body mass index is 29.3 kg/m².         Assessment & Plan   Diagnoses and all orders for this visit:    1. Hypertension, unspecified type (Primary)    Other orders  -     lisinopril (PRINIVIL,ZESTRIL) 10 MG tablet; Take 1 tablet by mouth Daily.  Dispense: 30 tablet; Refill: 2      Hypertension: Patient has been increasing his metoprolol to 2 and sometimes 3 a day to help keep his blood pressure down.  His heart rate is running in the 50s.  I do not advise him doing this any further he is going to continue on 1 metoprolol which she has been taking in the morning and we will add lisinopril 10 mg at night.  He is going to bring his blood pressure cuff in with him next time so we can see if it is reading accurately.  Blood pressure looks okay this morning though to be fair it is higher than his baseline and he did take 2 extra metoprolol yesterday

## 2025-02-19 ENCOUNTER — TELEPHONE (OUTPATIENT)
Dept: INTERNAL MEDICINE | Facility: CLINIC | Age: 70
End: 2025-02-19
Payer: COMMERCIAL

## 2025-02-19 NOTE — TELEPHONE ENCOUNTER
Caller: Mohini Angeles    Relationship: Emergency Contact    Best call back number: [phone]     What is the medical concern/diagnosis:   BLOOD PRESSURE     What specialty or service is being requested: CARDIOLOGIST    What is the provider, practice or medical service name:      What is the office location:      What is the office phone number:      Any additional details:  PATIENT 'S WIFE STATED THAT PATIENT BLOOD PRESSURE IS HIGH AND SHE WOULD LIKE TO GET HIM SCHEDULE WITH A CARDIOLOGIST.

## 2025-02-27 ENCOUNTER — OFFICE VISIT (OUTPATIENT)
Dept: INTERNAL MEDICINE | Facility: CLINIC | Age: 70
End: 2025-02-27
Payer: COMMERCIAL

## 2025-02-27 VITALS
SYSTOLIC BLOOD PRESSURE: 126 MMHG | OXYGEN SATURATION: 100 % | BODY MASS INDEX: 29.17 KG/M2 | HEIGHT: 73 IN | TEMPERATURE: 97.7 F | DIASTOLIC BLOOD PRESSURE: 82 MMHG | WEIGHT: 220.1 LBS | HEART RATE: 68 BPM

## 2025-02-27 DIAGNOSIS — I10 HYPERTENSION, UNSPECIFIED TYPE: Primary | ICD-10-CM

## 2025-02-27 PROCEDURE — 99213 OFFICE O/P EST LOW 20 MIN: CPT | Performed by: INTERNAL MEDICINE

## 2025-02-27 RX ORDER — LISINOPRIL 10 MG/1
10 TABLET ORAL DAILY
Qty: 90 TABLET | Refills: 3 | Status: SHIPPED | OUTPATIENT
Start: 2025-02-27

## 2025-02-27 NOTE — PROGRESS NOTES
Subjective   Federico Angeles is a 69 y.o. male.   Doing better with BP on the lisinopril  Hypertension       He has been under a lot of stress    The following portions of the patient's history were reviewed and updated as appropriate: allergies, current medications, past family history, past medical history, past social history, past surgical history, and problem list.    Review of Systems    Objective   Physical Exam  Vitals reviewed.   Constitutional:       Appearance: He is well-developed.   HENT:      Head: Normocephalic and atraumatic.      Right Ear: External ear normal.      Left Ear: External ear normal.   Eyes:      Conjunctiva/sclera: Conjunctivae normal.      Pupils: Pupils are equal, round, and reactive to light.   Neck:      Thyroid: No thyromegaly.      Trachea: No tracheal deviation.   Cardiovascular:      Rate and Rhythm: Normal rate and regular rhythm.      Heart sounds: Normal heart sounds.   Pulmonary:      Effort: Pulmonary effort is normal.      Breath sounds: Normal breath sounds.   Abdominal:      General: Bowel sounds are normal. There is no distension.      Palpations: Abdomen is soft.      Tenderness: There is no abdominal tenderness.   Musculoskeletal:         General: No deformity. Normal range of motion.      Cervical back: Normal range of motion.   Skin:     General: Skin is warm and dry.   Neurological:      Mental Status: He is alert and oriented to person, place, and time.   Psychiatric:         Behavior: Behavior normal.         Thought Content: Thought content normal.         Judgment: Judgment normal.         Vitals:    02/27/25 1009   BP: 126/82   Pulse: 68   Temp: 97.7 °F (36.5 °C)   SpO2: 100%     Body mass index is 29.05 kg/m².         Assessment & Plan   Diagnoses and all orders for this visit:    1. Hypertension, unspecified type (Primary)     HTN-  he is doing better with the lisinopril 10mg and he is doing better

## 2025-05-15 RX ORDER — HYDROXYZINE HYDROCHLORIDE 25 MG/1
TABLET, FILM COATED ORAL
Qty: 180 TABLET | Refills: 1 | Status: SHIPPED | OUTPATIENT
Start: 2025-05-15

## 2025-05-23 DIAGNOSIS — Z12.5 SCREENING FOR PROSTATE CANCER: ICD-10-CM

## 2025-05-23 DIAGNOSIS — Z00.00 HEALTHCARE MAINTENANCE: Primary | ICD-10-CM

## 2025-05-27 LAB
ALBUMIN SERPL-MCNC: 4.2 G/DL (ref 3.5–5.2)
ALBUMIN/GLOB SERPL: 1.7 G/DL
ALP SERPL-CCNC: 94 U/L (ref 39–117)
ALT SERPL-CCNC: 18 U/L (ref 1–41)
AST SERPL-CCNC: 21 U/L (ref 1–40)
BASOPHILS # BLD AUTO: 0.03 10*3/MM3 (ref 0–0.2)
BASOPHILS NFR BLD AUTO: 0.4 % (ref 0–1.5)
BILIRUB SERPL-MCNC: 0.5 MG/DL (ref 0–1.2)
BUN SERPL-MCNC: 16 MG/DL (ref 8–23)
BUN/CREAT SERPL: 17.8 (ref 7–25)
CALCIUM SERPL-MCNC: 9.1 MG/DL (ref 8.6–10.5)
CHLORIDE SERPL-SCNC: 104 MMOL/L (ref 98–107)
CHOLEST SERPL-MCNC: 211 MG/DL (ref 0–200)
CO2 SERPL-SCNC: 26.8 MMOL/L (ref 22–29)
CREAT SERPL-MCNC: 0.9 MG/DL (ref 0.76–1.27)
EGFRCR SERPLBLD CKD-EPI 2021: 91.3 ML/MIN/1.73
EOSINOPHIL # BLD AUTO: 0.37 10*3/MM3 (ref 0–0.4)
EOSINOPHIL NFR BLD AUTO: 5.4 % (ref 0.3–6.2)
ERYTHROCYTE [DISTWIDTH] IN BLOOD BY AUTOMATED COUNT: 12.8 % (ref 12.3–15.4)
GLOBULIN SER CALC-MCNC: 2.5 GM/DL
GLUCOSE SERPL-MCNC: 92 MG/DL (ref 65–99)
HCT VFR BLD AUTO: 48.1 % (ref 37.5–51)
HDLC SERPL-MCNC: 37 MG/DL (ref 40–60)
HGB BLD-MCNC: 15.4 G/DL (ref 13–17.7)
IMM GRANULOCYTES # BLD AUTO: 0.07 10*3/MM3 (ref 0–0.05)
IMM GRANULOCYTES NFR BLD AUTO: 1 % (ref 0–0.5)
LDLC SERPL CALC-MCNC: 147 MG/DL (ref 0–100)
LDLC/HDLC SERPL: 3.9 {RATIO}
LYMPHOCYTES # BLD AUTO: 2.49 10*3/MM3 (ref 0.7–3.1)
LYMPHOCYTES NFR BLD AUTO: 36.7 % (ref 19.6–45.3)
MCH RBC QN AUTO: 29.6 PG (ref 26.6–33)
MCHC RBC AUTO-ENTMCNC: 32 G/DL (ref 31.5–35.7)
MCV RBC AUTO: 92.5 FL (ref 79–97)
MONOCYTES # BLD AUTO: 0.63 10*3/MM3 (ref 0.1–0.9)
MONOCYTES NFR BLD AUTO: 9.3 % (ref 5–12)
NEUTROPHILS # BLD AUTO: 3.2 10*3/MM3 (ref 1.7–7)
NEUTROPHILS NFR BLD AUTO: 47.2 % (ref 42.7–76)
NRBC BLD AUTO-RTO: 0 /100 WBC (ref 0–0.2)
PLATELET # BLD AUTO: 249 10*3/MM3 (ref 140–450)
POTASSIUM SERPL-SCNC: 4.7 MMOL/L (ref 3.5–5.2)
PROT SERPL-MCNC: 6.7 G/DL (ref 6–8.5)
PSA SERPL-MCNC: 1.07 NG/ML (ref 0–4)
RBC # BLD AUTO: 5.2 10*6/MM3 (ref 4.14–5.8)
SODIUM SERPL-SCNC: 140 MMOL/L (ref 136–145)
TRIGL SERPL-MCNC: 149 MG/DL (ref 0–150)
TSH SERPL DL<=0.005 MIU/L-ACNC: 1.7 UIU/ML (ref 0.27–4.2)
VLDLC SERPL CALC-MCNC: 27 MG/DL (ref 5–40)
WBC # BLD AUTO: 6.79 10*3/MM3 (ref 3.4–10.8)

## 2025-06-02 ENCOUNTER — OFFICE VISIT (OUTPATIENT)
Dept: INTERNAL MEDICINE | Facility: CLINIC | Age: 70
End: 2025-06-02
Payer: COMMERCIAL

## 2025-06-02 VITALS
DIASTOLIC BLOOD PRESSURE: 66 MMHG | WEIGHT: 212.9 LBS | SYSTOLIC BLOOD PRESSURE: 110 MMHG | HEART RATE: 80 BPM | BODY MASS INDEX: 28.22 KG/M2 | TEMPERATURE: 97.8 F | HEIGHT: 73 IN | OXYGEN SATURATION: 99 %

## 2025-06-02 DIAGNOSIS — R06.02 SOB (SHORTNESS OF BREATH): ICD-10-CM

## 2025-06-02 DIAGNOSIS — I10 HYPERTENSION, UNSPECIFIED TYPE: ICD-10-CM

## 2025-06-02 DIAGNOSIS — Z00.00 HEALTH CARE MAINTENANCE: Primary | ICD-10-CM

## 2025-06-02 PROCEDURE — 99214 OFFICE O/P EST MOD 30 MIN: CPT | Performed by: INTERNAL MEDICINE

## 2025-06-02 PROCEDURE — 99397 PER PM REEVAL EST PAT 65+ YR: CPT | Performed by: INTERNAL MEDICINE

## 2025-06-02 RX ORDER — CITALOPRAM HYDROBROMIDE 10 MG/1
10 TABLET ORAL DAILY
Qty: 90 TABLET | Refills: 2 | Status: SHIPPED | OUTPATIENT
Start: 2025-06-02

## 2025-06-02 NOTE — PROGRESS NOTES
Subjective   Federico Angeles is a 69 y.o. male and is here for a comprehensive physical exam. The patient reports problems - more fatigue.  HE finds himself getting more fatigue with PA  no CP  no palp  occas SOB  and occas light headed upon standing  He has been eating better and exerisng occas  but he is very active  Pt has been taking BP meds as prescribed without any problems.  No HA  No episodes of orthostasis  Chronic back pain  he does cont to have pain on and off  he occas take hydrodocone  He does worry all the time   hydroxyzine not help    Do you take any herbs or supplements that were not prescribed by a doctor?       Social History: no tob no etoh  stays very active  Social History     Socioeconomic History   • Marital status:      Spouse name: PAT ANGELES   • Number of children: 2   Tobacco Use   • Smoking status: Never   • Smokeless tobacco: Never   • Tobacco comments:     Only as teen rarely   Vaping Use   • Vaping status: Never Used   Substance and Sexual Activity   • Alcohol use: Never   • Drug use: No   • Sexual activity: Not Currently     Partners: Female     Birth control/protection: None       Family History:   Family History   Problem Relation Age of Onset   • Heart attack Father 55        drinker   • Liver disease Father    • Alcohol abuse Father    • No Known Problems Sister    • No Known Problems Brother    • Malig Hyperthermia Neg Hx        Past Medical History:   Past Medical History:   Diagnosis Date   • Anxiety End of Jan 2023    Following shoulder surgery and difficulty sleeping   • Chronic left shoulder pain    • Chronic pain disorder     Back pain for years   • Claustrophobia    • Enlarged prostate    • Erectile dysfunction    • Generalized headaches     HISTORY OF   • Headache, tension-type     Off and on for years   • History of hepatitis    • Hypertension    • Infectious viral hepatitis 1992    Neither A or B diagnosed   • Insomnia    • Joint pain 2019    Both shoulders  "started a few years ago   • Leg cramps    • Low back pain 2016    Lower back pain for years-FOLLOWED BY PAIN MANAGEMENT   • PONV (postoperative nausea and vomiting)    • Raynauds disease    • Shingles     Long time ago. About 10yrs   • Tinnitus     BILATERAL           Review of Systems    Pertinent items are noted in HPI.    Objective   /66 (BP Location: Left arm, Patient Position: Sitting)   Pulse 80   Temp 97.8 °F (36.6 °C) (Oral)   Ht 185.4 cm (72.99\")   Wt 96.6 kg (212 lb 14.4 oz)   SpO2 99%   BMI 28.10 kg/m²     General Appearance:    Alert, cooperative, no distress, appears stated age   Head:    Normocephalic, without obvious abnormality, atraumatic   Eyes:    PERRL, conjunctiva/corneas clear, EOM's intact, fundi     benign, both eyes        Ears:    Normal TM's and external ear canals, both ears   Nose:   Nares normal, septum midline, mucosa normal, no drainage    or sinus tenderness   Throat:   Lips, mucosa, and tongue normal; teeth and gums normal   Neck:   Supple, symmetrical, trachea midline, no adenopathy;        thyroid:  No enlargement/tenderness/nodules; no carotid    bruit or JVD   Back:     Symmetric, no curvature, ROM normal, no CVA tenderness   Lungs:     Clear to auscultation bilaterally, respirations unlabored   Chest wall:    No tenderness or deformity   Heart:    Regular rate and rhythm, S1 and S2 normal, no murmur, rub   or gallop   Abdomen:     Soft, non-tender, bowel sounds active all four quadrants,     no masses, no organomegaly           Extremities:   Extremities normal, atraumatic, no cyanosis or edema   Pulses:   2+ and symmetric all extremities   Skin:   Skin color, texture, turgor normal, no rashes or lesions   Lymph nodes:   Cervical, supraclavicular, and axillary nodes normal   Neurologic:   CNII-XII intact. Normal strength, sensation and reflexes       throughout       Vitals:    06/02/25 1028   BP: 110/66   Pulse: 80   Temp: 97.8 °F (36.6 °C)   SpO2: 99%     Body " mass index is 28.1 kg/m².      Medications:   Current Outpatient Medications:   •  cetirizine (zyrTEC) 10 MG tablet, Take 1 tablet by mouth Daily., Disp: , Rfl:   •  HYDROcodone-acetaminophen (NORCO) 7.5-325 MG per tablet, Take 1 tablet by mouth 3 (Three) Times a Day., Disp: , Rfl:   •  hydrOXYzine (ATARAX) 25 MG tablet, TAKE 1 TABLET THREE TIMES A DAY AS NEEDED FOR ANXIETY, Disp: 180 tablet, Rfl: 1  •  lisinopril (PRINIVIL,ZESTRIL) 10 MG tablet, Take 1 tablet by mouth Daily., Disp: 90 tablet, Rfl: 3  •  metoprolol succinate XL (TOPROL-XL) 50 MG 24 hr tablet, TAKE 1 TABLET DAILY, Disp: 90 tablet, Rfl: 3  •  tadalafil (CIALIS) 20 MG tablet, Take 1 tablet by mouth Daily As Needed., Disp: , Rfl:   •  tamsulosin (FLOMAX) 0.4 MG capsule 24 hr capsule, Take 1 capsule by mouth Daily., Disp: , Rfl:     BMI is >= 25 and <30. (Overweight) The following options were offered after discussion;: exercise counseling/recommendations and nutrition counseling/recommendations       ECG 12 Lead    Date/Time: 6/4/2025 2:40 PM  Performed by: Ying Villatoro MD    Authorized by: Ying Villatoro MD  Previous ECG: no previous ECG available  Rhythm: sinus rhythm  Rate: normal  QRS axis: normal    Clinical impression: normal ECG         Assessment & Plan   Healthy male exam.      1. Healthcare Maintenance:  2. Patient Counseling:  --Nutrition: Stressed importance of moderation in sodium/caffeine intake, saturated fat and cholesterol, caloric balance, sufficient intake of fresh fruits, vegetables, fiber, calcium and vit D  --Exercise: . He does stay very active  no specific  --Substance Abuse: no tob no etoh  --Dental health: she does go to the dentist reg  --Immunizations reviewed.  --Discussed benefits of screening colonoscopy.  3.  Anxiety-this has been for a long time  but he has never been on a daily med for this  we will try celexa 10mg a day may cont to use the hydroxyzine as needed  he will let me know how this does  4.  Decreased exercise  tolerance-  checked an EKG today  if cont to have trouble   5.  HTN-  ok with lisinopril an toprol   occas llight headed will folow if pt notices more  6.  Chronic lbp-   stable with current meds   7. SOB-  some increaed with PA though he has not been exercising reg  EKG today normal  lung exam normal  if cont we will refet o cards  he will gradually add back PA  I cont will refer to card  he has a strong FH of CAD

## 2025-06-04 PROCEDURE — 93000 ELECTROCARDIOGRAM COMPLETE: CPT | Performed by: INTERNAL MEDICINE

## 2025-06-06 RX ORDER — LISINOPRIL 10 MG/1
10 TABLET ORAL DAILY
Qty: 14 TABLET | Refills: 0 | Status: SHIPPED | OUTPATIENT
Start: 2025-06-06

## (undated) DEVICE — IMMOB SHLDR PAD2 UNIV LG

## (undated) DEVICE — CANN TRPL DAM 7X7MM NO VLV

## (undated) DEVICE — EMERALD ARTHROSCOPY SHEET: Brand: CONVERTORS

## (undated) DEVICE — POSTN ARMSLV LAT/TRACTION DISP

## (undated) DEVICE — SOL ISO/ALC RUB 70PCT 4OZ

## (undated) DEVICE — GLV SURG BIOGEL LTX PF 6 1/2

## (undated) DEVICE — BLD SHAVER BONECUTTER 4MM 13CM

## (undated) DEVICE — GLV SURG SIGNATURE ESSENTIAL PF LTX SZ8.5

## (undated) DEVICE — APPL CHLORAPREP HI/LITE 26ML ORNG

## (undated) DEVICE — SKIN PREP TRAY W/CHG: Brand: MEDLINE INDUSTRIES, INC.

## (undated) DEVICE — TUBING SET, GRAVITY, 4-SPIKE

## (undated) DEVICE — DRSNG WND GZ CURAD OIL EMULSION 3X3IN STRL

## (undated) DEVICE — GLV SURG BIOGEL LTX PF 7

## (undated) DEVICE — DRAPE,U/ SHT,SPLIT,PLAS,STERIL: Brand: MEDLINE

## (undated) DEVICE — GLV SURG BIOGEL LTX PF 8 1/2

## (undated) DEVICE — SUT ETHLN 3/0 PS1 18IN 1663H

## (undated) DEVICE — SPNG GZ WOVN 4X4IN 12PLY 10/BX STRL

## (undated) DEVICE — PREP SOL POVIDONE/IODINE BT 4OZ

## (undated) DEVICE — GOWN,NON-REINFORCED,SIRUS,SET IN SLV,XXL: Brand: MEDLINE

## (undated) DEVICE — GLV SURG SIGNATURE ESSENTIAL PF LTX SZ7

## (undated) DEVICE — TP NDL SCORPION MULTIFIRE

## (undated) DEVICE — ABL ASP APOLLO RF XL 90D

## (undated) DEVICE — BUR SHAVER CLEARCUT 12FLUT 5MM 13CM

## (undated) DEVICE — PK ARTHSCP SHLDR TOWER 40

## (undated) DEVICE — FIBERLOOP, #2, BLUE
Type: IMPLANTABLE DEVICE | Site: SHOULDER | Status: NON-FUNCTIONAL
Brand: ARTHREX®